# Patient Record
Sex: FEMALE | Race: WHITE | NOT HISPANIC OR LATINO | Employment: OTHER | URBAN - METROPOLITAN AREA
[De-identification: names, ages, dates, MRNs, and addresses within clinical notes are randomized per-mention and may not be internally consistent; named-entity substitution may affect disease eponyms.]

---

## 2023-04-10 LAB
ANION GAP IN SER/PLAS: 12 MMOL/L (ref 10–20)
BASOPHILS (10*3/UL) IN BLOOD BY AUTOMATED COUNT: 0.08 X10E9/L (ref 0–0.1)
BASOPHILS/100 LEUKOCYTES IN BLOOD BY AUTOMATED COUNT: 0.9 % (ref 0–2)
CALCIUM (MG/DL) IN SER/PLAS: 9.2 MG/DL (ref 8.6–10.3)
CARBON DIOXIDE, TOTAL (MMOL/L) IN SER/PLAS: 31 MMOL/L (ref 21–32)
CHLORIDE (MMOL/L) IN SER/PLAS: 98 MMOL/L (ref 98–107)
CREATININE (MG/DL) IN SER/PLAS: 1.11 MG/DL (ref 0.5–1.05)
EOSINOPHILS (10*3/UL) IN BLOOD BY AUTOMATED COUNT: 0.2 X10E9/L (ref 0–0.4)
EOSINOPHILS/100 LEUKOCYTES IN BLOOD BY AUTOMATED COUNT: 2.3 % (ref 0–6)
ERYTHROCYTE DISTRIBUTION WIDTH (RATIO) BY AUTOMATED COUNT: 14.1 % (ref 11.5–14.5)
ERYTHROCYTE MEAN CORPUSCULAR HEMOGLOBIN CONCENTRATION (G/DL) BY AUTOMATED: 31.9 G/DL (ref 32–36)
ERYTHROCYTE MEAN CORPUSCULAR VOLUME (FL) BY AUTOMATED COUNT: 84 FL (ref 80–100)
ERYTHROCYTES (10*6/UL) IN BLOOD BY AUTOMATED COUNT: 5.34 X10E12/L (ref 4–5.2)
GFR FEMALE: 52 ML/MIN/1.73M2
GLUCOSE (MG/DL) IN SER/PLAS: 97 MG/DL (ref 74–99)
HEMATOCRIT (%) IN BLOOD BY AUTOMATED COUNT: 45.1 % (ref 36–46)
HEMOGLOBIN (G/DL) IN BLOOD: 14.4 G/DL (ref 12–16)
IMMATURE GRANULOCYTES/100 LEUKOCYTES IN BLOOD BY AUTOMATED COUNT: 0.5 % (ref 0–0.9)
LEUKOCYTES (10*3/UL) IN BLOOD BY AUTOMATED COUNT: 8.8 X10E9/L (ref 4.4–11.3)
LYMPHOCYTES (10*3/UL) IN BLOOD BY AUTOMATED COUNT: 2.1 X10E9/L (ref 0.8–3)
LYMPHOCYTES/100 LEUKOCYTES IN BLOOD BY AUTOMATED COUNT: 24 % (ref 13–44)
MONOCYTES (10*3/UL) IN BLOOD BY AUTOMATED COUNT: 0.85 X10E9/L (ref 0.05–0.8)
MONOCYTES/100 LEUKOCYTES IN BLOOD BY AUTOMATED COUNT: 9.7 % (ref 2–10)
NEUTROPHILS (10*3/UL) IN BLOOD BY AUTOMATED COUNT: 5.48 X10E9/L (ref 1.6–5.5)
NEUTROPHILS/100 LEUKOCYTES IN BLOOD BY AUTOMATED COUNT: 62.6 % (ref 40–80)
PLATELETS (10*3/UL) IN BLOOD AUTOMATED COUNT: 461 X10E9/L (ref 150–450)
POTASSIUM (MMOL/L) IN SER/PLAS: 4.1 MMOL/L (ref 3.5–5.3)
SODIUM (MMOL/L) IN SER/PLAS: 137 MMOL/L (ref 136–145)
UREA NITROGEN (MG/DL) IN SER/PLAS: 21 MG/DL (ref 6–23)

## 2023-04-12 LAB — STAPH/MRSA SCREEN, CULTURE: NORMAL

## 2023-04-16 LAB — SARS-COV-2 RESULT: NOT DETECTED

## 2023-07-25 ENCOUNTER — HOSPITAL ENCOUNTER (OUTPATIENT)
Dept: DATA CONVERSION | Facility: HOSPITAL | Age: 73
End: 2023-07-25
Attending: OBSTETRICS & GYNECOLOGY | Admitting: OBSTETRICS & GYNECOLOGY
Payer: MEDICARE

## 2023-07-25 DIAGNOSIS — N95.0 POSTMENOPAUSAL BLEEDING: ICD-10-CM

## 2023-07-25 DIAGNOSIS — C54.1 MALIGNANT NEOPLASM OF ENDOMETRIUM (MULTI): ICD-10-CM

## 2023-08-03 LAB
ANION GAP IN SER/PLAS: 16 MMOL/L (ref 10–20)
BASOPHILS (10*3/UL) IN BLOOD BY AUTOMATED COUNT: 0.08 X10E9/L (ref 0–0.1)
BASOPHILS/100 LEUKOCYTES IN BLOOD BY AUTOMATED COUNT: 0.9 % (ref 0–2)
CALCIUM (MG/DL) IN SER/PLAS: 9.6 MG/DL (ref 8.6–10.3)
CARBON DIOXIDE, TOTAL (MMOL/L) IN SER/PLAS: 27 MMOL/L (ref 21–32)
CHLORIDE (MMOL/L) IN SER/PLAS: 98 MMOL/L (ref 98–107)
COMPLETE PATHOLOGY REPORT: NORMAL
CONVERTED ADDENDUM DIAGNOSIS 2: NORMAL
CONVERTED ADDENDUM DIAGNOSIS 3: NORMAL
CONVERTED ADDENDUM DIAGNOSIS 4: NORMAL
CONVERTED CLINICAL DIAGNOSIS-HISTORY: NORMAL
CONVERTED FINAL DIAGNOSIS: NORMAL
CONVERTED FINAL REPORT PDF LINK TO COPY AND PASTE: NORMAL
CONVERTED GROSS DESCRIPTION: NORMAL
CONVERTED PHYSICIAN NOTIFICATION: NORMAL
CREATININE (MG/DL) IN SER/PLAS: 0.94 MG/DL (ref 0.5–1.05)
EOSINOPHILS (10*3/UL) IN BLOOD BY AUTOMATED COUNT: 0.2 X10E9/L (ref 0–0.4)
EOSINOPHILS/100 LEUKOCYTES IN BLOOD BY AUTOMATED COUNT: 2.1 % (ref 0–6)
ERYTHROCYTE DISTRIBUTION WIDTH (RATIO) BY AUTOMATED COUNT: 14.5 % (ref 11.5–14.5)
ERYTHROCYTE MEAN CORPUSCULAR HEMOGLOBIN CONCENTRATION (G/DL) BY AUTOMATED: 32 G/DL (ref 32–36)
ERYTHROCYTE MEAN CORPUSCULAR VOLUME (FL) BY AUTOMATED COUNT: 82 FL (ref 80–100)
ERYTHROCYTES (10*6/UL) IN BLOOD BY AUTOMATED COUNT: 5.33 X10E12/L (ref 4–5.2)
GFR FEMALE: 64 ML/MIN/1.73M2
GLUCOSE (MG/DL) IN SER/PLAS: 78 MG/DL (ref 74–99)
HEMATOCRIT (%) IN BLOOD BY AUTOMATED COUNT: 43.8 % (ref 36–46)
HEMOGLOBIN (G/DL) IN BLOOD: 14 G/DL (ref 12–16)
IMMATURE GRANULOCYTES/100 LEUKOCYTES IN BLOOD BY AUTOMATED COUNT: 0.3 % (ref 0–0.9)
LEUKOCYTES (10*3/UL) IN BLOOD BY AUTOMATED COUNT: 9.4 X10E9/L (ref 4.4–11.3)
LYMPHOCYTES (10*3/UL) IN BLOOD BY AUTOMATED COUNT: 2.33 X10E9/L (ref 0.8–3)
LYMPHOCYTES/100 LEUKOCYTES IN BLOOD BY AUTOMATED COUNT: 24.8 % (ref 13–44)
MONOCYTES (10*3/UL) IN BLOOD BY AUTOMATED COUNT: 0.8 X10E9/L (ref 0.05–0.8)
MONOCYTES/100 LEUKOCYTES IN BLOOD BY AUTOMATED COUNT: 8.5 % (ref 2–10)
NEUTROPHILS (10*3/UL) IN BLOOD BY AUTOMATED COUNT: 5.96 X10E9/L (ref 1.6–5.5)
NEUTROPHILS/100 LEUKOCYTES IN BLOOD BY AUTOMATED COUNT: 63.4 % (ref 40–80)
PLATELETS (10*3/UL) IN BLOOD AUTOMATED COUNT: 459 X10E9/L (ref 150–450)
POTASSIUM (MMOL/L) IN SER/PLAS: 4 MMOL/L (ref 3.5–5.3)
SODIUM (MMOL/L) IN SER/PLAS: 137 MMOL/L (ref 136–145)
UREA NITROGEN (MG/DL) IN SER/PLAS: 18 MG/DL (ref 6–23)

## 2023-08-04 LAB — URINE CULTURE: ABNORMAL

## 2023-08-05 LAB — STAPH/MRSA SCREEN, CULTURE: NORMAL

## 2023-08-09 LAB — URINE CULTURE: NORMAL

## 2023-08-18 ENCOUNTER — HOSPITAL ENCOUNTER (OUTPATIENT)
Dept: DATA CONVERSION | Facility: HOSPITAL | Age: 73
End: 2023-08-19
Attending: OBSTETRICS & GYNECOLOGY | Admitting: OBSTETRICS & GYNECOLOGY
Payer: MEDICARE

## 2023-08-18 DIAGNOSIS — N95.0 POSTMENOPAUSAL BLEEDING: ICD-10-CM

## 2023-08-18 DIAGNOSIS — N88.8 OTHER SPECIFIED NONINFLAMMATORY DISORDERS OF CERVIX UTERI: ICD-10-CM

## 2023-08-18 DIAGNOSIS — N81.89 OTHER FEMALE GENITAL PROLAPSE: ICD-10-CM

## 2023-08-18 DIAGNOSIS — N83.202 UNSPECIFIED OVARIAN CYST, LEFT SIDE: ICD-10-CM

## 2023-08-18 DIAGNOSIS — N83.201 UNSPECIFIED OVARIAN CYST, RIGHT SIDE: ICD-10-CM

## 2023-08-18 DIAGNOSIS — N39.3 STRESS INCONTINENCE (FEMALE) (MALE): ICD-10-CM

## 2023-08-18 DIAGNOSIS — D25.9 LEIOMYOMA OF UTERUS, UNSPECIFIED: ICD-10-CM

## 2023-08-18 DIAGNOSIS — C54.1 MALIGNANT NEOPLASM OF ENDOMETRIUM (MULTI): ICD-10-CM

## 2023-08-18 DIAGNOSIS — N83.8 OTHER NONINFLAMMATORY DISORDERS OF OVARY, FALLOPIAN TUBE AND BROAD LIGAMENT: ICD-10-CM

## 2023-08-18 LAB
ANION GAP IN SER/PLAS: 14 MMOL/L (ref 10–20)
CALCIUM (MG/DL) IN SER/PLAS: 8.7 MG/DL (ref 8.6–10.3)
CARBON DIOXIDE, TOTAL (MMOL/L) IN SER/PLAS: 29 MMOL/L (ref 21–32)
CHLORIDE (MMOL/L) IN SER/PLAS: 98 MMOL/L (ref 98–107)
CREATININE (MG/DL) IN SER/PLAS: 1.01 MG/DL (ref 0.5–1.05)
ERYTHROCYTE DISTRIBUTION WIDTH (RATIO) BY AUTOMATED COUNT: 14.4 % (ref 11.5–14.5)
ERYTHROCYTE MEAN CORPUSCULAR HEMOGLOBIN CONCENTRATION (G/DL) BY AUTOMATED: 33.3 G/DL (ref 32–36)
ERYTHROCYTE MEAN CORPUSCULAR VOLUME (FL) BY AUTOMATED COUNT: 82 FL (ref 80–100)
ERYTHROCYTES (10*6/UL) IN BLOOD BY AUTOMATED COUNT: 4.94 X10E12/L (ref 4–5.2)
GFR FEMALE: 58 ML/MIN/1.73M2
GLUCOSE (MG/DL) IN SER/PLAS: 141 MG/DL (ref 74–99)
HEMATOCRIT (%) IN BLOOD BY AUTOMATED COUNT: 40.3 % (ref 36–46)
HEMOGLOBIN (G/DL) IN BLOOD: 13.4 G/DL (ref 12–16)
LEUKOCYTES (10*3/UL) IN BLOOD BY AUTOMATED COUNT: 19 X10E9/L (ref 4.4–11.3)
PLATELETS (10*3/UL) IN BLOOD AUTOMATED COUNT: 418 X10E9/L (ref 150–450)
POCT GLUCOSE: 145 MG/DL (ref 74–99)
POTASSIUM (MMOL/L) IN SER/PLAS: 3.8 MMOL/L (ref 3.5–5.3)
SODIUM (MMOL/L) IN SER/PLAS: 137 MMOL/L (ref 136–145)
UREA NITROGEN (MG/DL) IN SER/PLAS: 21 MG/DL (ref 6–23)

## 2023-08-24 LAB
COMPLETE PATHOLOGY REPORT: NORMAL
CONVERTED CLINICAL DIAGNOSIS-HISTORY: NORMAL
CONVERTED FINAL DIAGNOSIS: NORMAL
CONVERTED FINAL REPORT PDF LINK TO COPY AND PASTE: NORMAL
CONVERTED GROSS DESCRIPTION: NORMAL
CONVERTED SYNOPTIC DIAGNOSIS: NORMAL

## 2023-08-29 ENCOUNTER — HOSPITAL ENCOUNTER (OUTPATIENT)
Dept: DATA CONVERSION | Facility: HOSPITAL | Age: 73
End: 2023-08-29

## 2023-09-29 VITALS — HEIGHT: 65 IN | BODY MASS INDEX: 48.82 KG/M2 | WEIGHT: 293 LBS

## 2023-09-30 NOTE — H&P
History of Present Illness:   History Present Illness:  Reason for surgery: 72 year old female with lichen  sclerosis and PMB p/f D&C hysteroscopy and polypectomy   HPI:    72 year old female with lichen sclerosis and PMB p/f D&C hysteroscopy and polypectomy     PMH: Hx of DCIS, HTN  PSH:    · History of Arthrodesis Cervical   · History of Breast Surgery   · History of Cataract surgery   · History of Craniotomy Infratentorial Excision Of Meningioma   · History of Craniotomy Supratentorial Excision Of Meningioma   · History of Spine Repair  CERVICAL SPINE SURGERY/SPINAL FUSION   · History of Tubal Ligation  Meds: amlodipine, effexor, HCTZ, lorazepam, oxycodone, tramadol  All: codeine, prilosec  Social hx: negx3  Fam hx: reviewed, noncontributory      Allergies:        Allergies:  ·  hydrocortisone : Unknown  ·  codeine : Itching  ·  hair  dye: Itching    Home Medication Review:   Home Medications Reviewed: yes     Impression/Procedure:   ·  Impression and Planned Procedure: 72 year old female with lichen sclerosis and PMB p/f D&C hysteroscopy and polypectomy       ERAS (Enhanced Recovery After Surgery):  ·  ERAS Patient: yes   ·  CPM/PAT Utilization: no   ·  Immunonutrition Recovery Drink Utilization: no   ·  Carbohydrate Supplement Drink Utilization: no     Review of Systems:   Review of Systems:  All Other Systems: All other systems reviewed and  are negative       Physical Exam by System:    Constitutional: NAD   Eyes: EOMI   ENMT: mucous membranes moist   Head/Neck: NCAT   Respiratory/Thorax: normal work of breathing   Cardiovascular: warm and well perfused   Gastrointestinal: abdomen soft   Musculoskeletal: normal ROM   Extremities: no edema appreciated   Neurological: no gross deficits   Psychological: normal mood and affect   Skin: Warm and dry     Consent:   COVID-19 Consent:  ·  COVID-19 Risk Consent Surgeon has reviewed key risks related to the risk of leonila COVID-19 and if they contract COVID-19  what the risks are.     Attestation:   Note Completion:  I am a:  Resident/Fellow   Attending Attestation I saw and evaluated the patient.  I personally obtained the key and critical portions of the history and physical exam or was physically present for key and  critical portions performed by the resident/fellow. I reviewed the resident/fellow?s documentation and discussed the patient with the resident/fellow.  I agree with the resident/fellow?s medical decision making as documented in the note.     I personally evaluated the patient on 25-Jul-2023         Electronic Signatures:  Anastacia Pack)  (Signed 25-Jul-2023 09:23)   Authored: Note Completion   Co-Signer: History of Present Illness, Allergies, Home Medication Review, Impression/Procedure, ERAS, Review of Systems, Physical Exam,  Consent, Note Completion  Aggie Freeman (Resident))  (Signed 25-Jul-2023 07:21)   Authored: History of Present Illness, Allergies, Home  Medication Review, Impression/Procedure, ERAS, Review of Systems, Physical Exam, Consent, Note Completion      Last Updated: 25-Jul-2023 09:23 by Anastacia Pack)

## 2023-09-30 NOTE — DISCHARGE SUMMARY
Send Summary:   Discharge Summary Providers:  Provider Role Provider Name   · Attending Anastacia Pack       Note Recipients: Anastacia Pack MD       Discharge:    Summary:   Admission Date: .18-Aug-2023 10:37:00   Discharge Date: 19-Aug-2023   Attending Physician at Discharge: Anastacia Pack   Admission Reason: endometrial cancer, pelvic organ  prolapse   Final Discharge Diagnoses: pelvic organ prolapse,  MIKALA, endometrial cancer   Procedures: Date: 18-Aug-2023 15:33:00  Procedure Name: 1. Total laparoscopic hysterectomy  2. Bilateral salpingo-oophorectomy  3. [place] lymph node mapping   4. Pelvic lymph node biopsies    Date: 18-Aug-2023 17:39:00  Procedure Name: TLH, BS, bilateral SLND by Dr. Trent   LSC USLS, MUS, cystoscopy by Dr. Pack   Condition at Discharge: Satisfactory   Disposition at Discharge: .Home   Hospital Course:    Ms. Lira is POD 1 s/p TLH, BSO, bilateral SLND, LSC USLS, MUS, cystoscopy for Gr1 EMCA and POP/ MIKALA.      She was transferred from the PACU to the floor.  She met all post-op milestones.  VS have been reassuring overnight and exam is benign.  CBC appears hemoconcentrated, no concern for infection based on exam and VS.    MUS c/b bladder perforation, so sanabria catheter remains in place with plan to remove on Wednesday.                Discharge Information:    and Continuing Care:   Lab Results - Pending:    Surgical Pathology Drawn at 18-Aug-2023 17:12:00  Radiology Results - Pending: None   Discharge Instructions:    Activity:           activity as tolerated   No lifting > 10 lbs for 6 weeks.          May shower..            May drive..  OK to drive when no longer using narcotics          No pushing, pulling, or lifting objects greater than 10 pounds for 6 week(s).      Nutrition/Diet:           regular    Catheter Care:           Type:   indwelling,  You will return to Dr. Pack's office on 8/23 for removal of sanabria    Follow Up Appointments:    Follow-Up Appointment  01:           Physician/Dept/Service:   Dr. Pack          Reason for Referral:   Sanabria removal          Call to Schedule in:   2-3 days,  Schedule sanabria removal on 8/23 and 3 week post-op visit          Scheduled Date/Time:   23-Aug-2023    Follow-Up Appointment 02:           Physician/Dept/Service:   Dr. Trent          Reason for Referral:   GYN Oncology          Call to Schedule in:   2 weeks    Discharge Medications: Home Medication   Effexor  mg oral capsule, extended release - 1 cap(s) orally once a day  ZyrTEC 10 mg oral tablet - 1 tab(s) orally once a day  amLODIPine 2.5 mg oral tablet - 1 tab(s) orally 2 times a day  hydroCHLOROthiazide 12.5 mg oral tablet - 1 tab(s) orally once a day  Calcium 600+D oral tablet - 1 tab(s) orally once a day  famotidine 40 mg oral tablet - 1 tab(s) orally once a day (at bedtime)  famotidine 20 mg oral tablet - 1 tab(s) orally once a day in AM  Vitamin D3 2000 intl units oral capsule - 1 cap(s) orally once a day  acetaminophen 500 mg oral tablet - 2 tab(s) orally every 6 hours   ibuprofen 600 mg oral tablet - 1 tab(s) orally every 6 hours   oxyCODONE 5 mg oral tablet - 1 tab(s) orally every 6 hours as needed  Effexor XR 37.5 mg oral capsule, extended release - 1 cap(s) orally once a day  Vitamin B12 5000 mcg oral tablet, disintegrating - 1 tab(s) orally every other day (at bedtime)  Peridex 0.12% mucous membrane liquid - 15 milliliter(s) orally 2 times a day  use night before surgery and morning of surgery swish and spit      PRN Medication   Tylenol 500 mg oral tablet - 2 tab(s) orally every 6 hours, As Needed  clotrimazole 1% topical cream - Apply topically to affected area 2 times a day, As Needed  Flonase 50 mcg/inh nasal spray - 1 spray(s) nasal once a day, As Needed  albuterol - 1-2 puffs inhaled 90mcg inhaler every 8 hours, As Needed for shortness of breath  Ativan 0.5 mg oral tablet - 1/2 tab(s) orally 3 times a day, As Needed     DNR Status:   ·  Code Status  Code Status order at time of discharge: Full Code     Attestation:   Note Completion:  I am a:  Resident/Fellow   Attending Attestation I reviewed the resident/fellow?s documentation and discussed the patient with the resident/fellow.  I agree with the resident/fellow?s medical  decision making as documented in the note.          Electronic Signatures:  Dara Carbajal (Fellow))  (Signed 19-Aug-2023 09:24)   Authored: Send Summary, Summary Content, Ongoing Care,  DNR Status, Note Completion  Anastacia Pack)  (Signed 19-Aug-2023 10:09)   Authored: Summary Content, Note Completion   Co-Signer: Send Summary, Summary Content, Ongoing Care, DNR Status, Note Completion      Last Updated: 19-Aug-2023 10:09 by Anastacia Pack)

## 2023-09-30 NOTE — PROGRESS NOTES
Service: Gynecology/Obstetrics     Subjective Data:   MANSI CARTER is a 73 year old Female who is Hospital Day # 2 and POD #1 for TLH, BSO, bilateral SLND, LSC USLS, MUS, cystoscopy.    Additional Information:    Ms. Carter is feeling very well this morning.   Her pain was controlled overnight.  She tolerated clears and a banana without n/v.  She has ambulated around  the floor x1.  Holbrook remains in place draining yellow urine.  Has been passing flatus.      Objective Data:     Objective Information:        T   P  R  BP   MAP  SpO2   Value  36.6  72  17  119/69      94%  Date/Time 8/18 23:00 8/19 7:28 8/19 7:28 8/19 7:28    8/19 7:28  Range  (36.6C - 37C )  (72 - 81 )  (17 - 18 )  (119 - 153 )/ (69 - 86 )    (94% - 96% )  Highest temp of 37 C was recorded at 8/18 20:15    Physical Exam by System:    Constitutional: Well developed, awake/alert/oriented  x3, no distress, alert and cooperative   Respiratory/Thorax: Patent airways, CTAB, normal  breath sounds with good chest expansion, thorax symmetric   Cardiovascular: Regular, rate and rhythm, no murmurs,  2+ equal pulses of the extremities, normal S 1and S 2   Gastrointestinal: Nondistended, soft, non-tender,  no rebound tenderness or guarding, no masses palpable, no organomegaly, +BS; 5 incisions C/D/I with glue and small bruising   Genitourinary: Holbrook in place   Extremities: normal extremities, no cyanosis edema,  contusions or wounds   Psychological: Appropriate mood and behavior   Skin: Warm and dry, no lesions, no rashes     Recent Lab Results:    Results:    CBC: 8/18/2023 23:20              \     Hgb     /                              \     13.4       /  WBC  ----------------  Plt               19.0 H    ----------------    418              /     Hct     \                              /     40.3       \            RBC: 4.94     MCV: 82           BMP: 8/18/2023 23:20  NA+        Cl-     BUN  /                         137    98    21   /  --------------------------------  Glucose                ---------------------------  141 H    K+     HCO3-   Creat \                         3.8  29    1.01  \  Calcium : 8.7     Anion Gap : 14      Assessment and Plan:   Daily Risk Screen:  ·  Does patient have an indwelling urinary catheter? yes   ·  Plan for indwelling urinary catheter removal today? no   ·  The patient continues to require indwelling urinary catheterization for perioperative use for selected surgical procedures   ·  Does patient have a central line? n/a consulting service          Additional Dx:   Uterine prolapse: Entered Date: 18-Aug-2023 17:36   Stress incontinence in female: Entered Date: 18-Aug-2023  17:36   Postoperative pain: Entered Date: 18-Aug-2023 17:29   Endometrial cancer determined by uterine biopsy: Entered  Date: 14-Aug-2023 10:09    Code Status:  ·  Code Status Full Code     Advance Care Planning:  Advance Care Planning: Patient didn't wish to or  was unable to provide advance care plan     Assessment:    Ms. Lira is POD 1 s/p TLH, BSO, bilateral SLND, LSC USLS, MUS, cystoscopy for Gr1 EMCA and POP/ MIKALA.      She is doing well post-operatively, meeting milestones.  MUS c/b bladder perforation, will plan for sanabria to remain in place until Wednesday.      VS have been reassuring overnight and exam is benign.  CBC appears hemoconcentrated, no concern for infection based on exam and VS.  Plan for discharge home this AM.        Attestation:   Note Completion:  I am a:  Resident/Fellow   Attending Attestation I reviewed the resident/fellow?s documentation and discussed the patient with the resident/fellow.  I agree with the resident/fellow?s medical  decision making as documented in the note.          Electronic Signatures:  Dara Carbajal (Fellow))  (Signed 19-Aug-2023 09:28)   Authored: Service, Subjective Data, Objective Data, Assessment  and Plan, Note Completion  Anastacia Pack)  (Signed 19-Aug-2023  10:09)   Authored: Assessment and Plan, Note Completion   Co-Signer: Service, Subjective Data, Objective Data, Assessment and Plan, Note Completion      Last Updated: 19-Aug-2023 10:09 by Anastacia Pack)

## 2023-09-30 NOTE — H&P
History & Physical Reviewed:   I have reviewed the History and Physical dated:  18-Aug-2023   History and Physical reviewed and relevant findings noted. Patient examined to review pertinent physical  findings.: No significant changes   Home Medications Reviewed: no changes noted   Allergies Reviewed: no changes noted       ERAS (Enhanced Recovery After Surgery):  ·  ERAS Patient: yes   ·  CPM/PAT Utilization: no   ·  Immunonutrition Recovery Drink Utilization: no   ·  Carbohydrate Supplement Drink Utilization: no     Consent:   COVID-19 Consent:  ·  COVID-19 Risk Consent Surgeon has reviewed key risks related to the risk of leonila COVID-19 and if they contract COVID-19 what the risks are.     Attestation:   Note Completion:  I am a:  Resident/Fellow   Attending Attestation I saw and evaluated the patient.  I personally obtained the key and critical portions of the history and physical exam or was physically present for key and  critical portions performed by the resident/fellow. I reviewed the resident/fellow?s documentation and discussed the patient with the resident/fellow.  I agree with the resident/fellow?s medical decision making as documented in the note.     I personally evaluated the patient on 18-Aug-2023         Electronic Signatures:  Mitali Garcia (Resident))  (Signed 18-Aug-2023 09:40)   Authored: History & Physical Reviewed, ERAS, Consent,  Note Completion  Demetria Trent)  (Signed 23-Aug-2023 10:19)   Authored: Note Completion   Co-Signer: History & Physical Reviewed, ERAS, Consent, Note Completion      Last Updated: 23-Aug-2023 10:19 by Demetria Trent)

## 2023-10-01 NOTE — OP NOTE
Post Operative Note:     PreOp Diagnosis: Grade 1 endometrial cancer   Post-Procedure Diagnosis: Same; clinical stage I   Procedure: 1. Total laparoscopic hysterectomy  2. Bilateral salpingo-oophorectomy  3. [place] lymph node mapping   4. Pelvic lymph node biopsies   Surgeon: PETER Trent   Resident/Fellow/Other Assistant: AVINASH Womack   Anesthesia: GETA   I.V. Fluids: See EHR   Estimated Blood Loss (mL): 50   Blood Replacement: 0   Specimen: yes. Uterus, cervix, bilateral tubes and  ovaries; left sentinel pelvic lymph node, right sentinel pelvic lymph node   Complications: None apparent   Findings: Lobular multifibroid uterus     Operative Report Dictated:  Dictation: not applicable - note contains Operative  Report   Operative Report:    he patient was taken to the operating room where she had sequential compression devices placed and received perioperative antibiotics.  A safety check confirming  patient's name and MR number was performed. General anesthesia was administered and found to be adequate. The patient was positioned in low lithotomy with use of Alexis stirrups. The abdomen and vagina were prepped and draped in sterile fashion. A Holbrook  catheter was placed.  A speculum was placed in the vagina and a single-tooth tenaculum was placed on the anterior lip of the cervix.  A 1.25:1 solution of indocyanine green and sterile water was injected into the cervix at 3 and 9 o'clock both superficial  and deep for sentinel lymph node mapping. The uterus sounded to 8 cm.  The cervix was dilated and medium V care was placed.      Attention was then turned to the patient's abdomen where a 12mm skin incision was made at the umbilicus. The anterior fascia was identified under direct visualization and grasped with tonsil clamp x2 and incised sharply. The fused anterior posterior fascia  was tagged with a 0-Vicryl suture. A 12mm Levar port was then inserted and pneumoperitoneum established without difficulty. The  camera was inserted without difficulty. Entry site was inspected and surveillance of the abdomen performed, demonstrating  no evidence of bowel or vascular injury at entry.  The findings dictated above were then noted.  Two additional 5 mm ports were placed in the right and left lower quadrant under direct visualization without difficulty.  The patient was then placed in  steep Trendelenburg position and the bowel was elevated out of the pelvis using an atraumatic grasper.  Small omental adhesions were taken, using the LigaSure bipolar device with careful attention to underlying structures.     The uterus was then anteverted.  The round ligament was identified on the left side and it was transected using a 5 mm laparoscopic LigaSure device. The retroperitoneum was entered  and the peritoneum was incised in a plane lateral and parallel to the ovarian blood supply.  The infundibulopelvic ligament was skeletonized. The ureter was visualized and well posterior to the ovarian blood supply. Retroperitoneal spaces were developed  bluntly. [place] lymph node mapping was successful and noted to map to the obturator lymph node chain. The sentinel node was gently grasped and a combination of blunt, sharp, and electrocautery dissection was used to excise the node. The obturator nerve  was visualized during this dissection to minimize injury. Firefly mode confirmed successful removal and the node was left in situ for later removal after colpotomy. Hemostasis was noted from this dissection. A similar process was repeated on the contralateral  side, including development of retroperitoneal spaces after visualization of the ureter. The right side similarly mapped to the obturator lymph node chain which was excised and hemostasis noted with this dissection with visualization of the obturator  nerve.     A window was made under the infundibulopelvic ligament and the IP ligament was desiccated and transected using the LigaSure  device.  The posterior leaf of the broad ligament was incised.   A bladder flap was then created anteriorly on the right side using the LigaSure.  After skeletonizing the uterine artery, it was taken with the LigaSure.  The cardinal and uterosacral ligaments were divided with the LigaSure, with each pedicle created  medial to the previous to prevent ureteral injury. Attention was then turned to the patient's left side where in a similar fashion, the steps were repeated. Visualization of the ureter was also performed prior to desiccating and transecting the infundibulopelvic  ligament.  The posterior leaf of the broad ligament was then opened.  The bladder flap was completed on the left side.  The uterine arteries were skeletonized and were taken using the LigaSure device. The cardinal and uterosacral ligaments were divided  in a similar manner as the right side.  At this point, the uterine manipulator was clearly palpated and the cervicovaginal junction was incised circumferentially with cautery without any difficulty.  The uterus with cervix, bilateral fallopian tubes and  ovaries were placed in endocatch bag for removal through the vagina and submitted for evaluation. At this point, all pedicles were inspected and were hemostatic. The left pelvic sentinel lymph node and right pelvic sentinel lymph nodes were subsequently  removed from the assistant ports and submitted to Pathology.     Hemostasis was noted from the surgical dissection and the pelvis was irrigated. The vaginal cuff was reapproximated using 0 V-Lock suture and closed laparoscopically. Good hemostasis  was noted. At this point, the remainder of the case was handed over to Dr Pack for completion of the uterusacral ligament suspension and prolapse correction. The patient tolerated the procedure well. Please refer to separate dictation for further details  and conclusion of the procedure.     Attestation:   Note Completion:  Attending Attestation I  was present for the entire procedure         Electronic Signatures:  Demetria Trent)  (Signed 18-Aug-2023 15:47)   Authored: Post Operative Note, Note Completion      Last Updated: 18-Aug-2023 15:47 by Demetria Trent)

## 2023-10-01 NOTE — OP NOTE
PROCEDURE DETAILS    Preoperative Diagnosis:  Stress urinary incontinence  Pelvic organ prolapse  Endometrial cancer  Postoperative Diagnosis:  Stress urinary incontinence  Pelvic organ prolapse  Endometrial cancer  Surgeon: Anastacia Pack  Resident/Fellow/Other Assistant: Dara Farr    Procedure:  TLH, BS, bilateral SLND by Dr. Trent   [facility] USLS, MUS, cystoscopy by Dr. Pack  Anesthesia: GETA  Estimated Blood Loss: 75cc total  Findings: Bilateral ureteral jets seen briskly from both ureters after removing left uterosacral stitch.  Good apical support noted.   Sling trocar noted to palmer bladder, after sling replaced  no injury noted.  IV Fluids: 1600  Additional Details: PROCEDURE IN DETAIL:  The patient was interviewed in the preop holding area by the surgical team, where the risks, benefits, and indication of the procedure were reviewed.  The patient was positioned in dorsal lithotomy and underwent total laparoscopic hysterectomy, bilateral  salpingectomy, bilateral pelvic sentinel LN mapping and biopsy for Endometrial cancer - please see Dr. Trent's operative note for additional details.     After the vaginal cuff was closed, attention turned to the laparoscopic uterosacral ligament suspension.  0-Gortex suture was used to suture the right uterosacral ligament to the vaginal cuff, the same procedure was repeated on the left.  The Laparoscopic  ports were closed (3-0 monocryl on skin and 0-vicryl was used to close fascia of the supra-umbilical port) and cystoscopy was performed.  Brisk efflux from the right ureter was noted and small efflux from the right ureter was noted.  Decision made to  replace 2 laparoscopic ports and the left uterosacral suture was cut.  Repeat cystoscopy was performed with brisk efflux from both ureters, and the 2 laparoscopic port sites were re-closed.  Dermabond was placed over all incisions.      Attention was turned to the sling.  The Lonestar retractor was placed  around the perineum. The anterior vaginal wall was infiltrated with lidocaine 1% with epinephrine and a superficial incision starting approximately 1 cm inferior to the urethral meatus  was carried out approximately 1.5 cm with a knife. From superficial to deep, the anterior vaginal wall was then dissected from the tissue surrounding the urethra until the pubic bone was reached. This procedure was repeated on the opposite side. Two small  knife holes were then created in the mons pubis just cephalad and approximately 1 cm lateral to the pubic symphysis. The sling trocars were then inserted through the holes in the vagina and were brought out through the mons pubis. A cystoscopy was performed  which demonstrated the left sling trocar had pierced the bladder.  The trocar was removed and re-passed.  Repeat cystoscopy was performed and no additional defects were noted within the bladder wall.  Prior puncture site was hemostatic.  Bilateral ureteral  orifices also produced normal jets. The mesh sling was then pulled through the dissected space. After the sling was tensioned appropriately, the remainder of the mesh protruding from the mons pubis was cut, leaving just two puncture holes on the mons  pubis. The anterior vaginal wall was then closed with a 3.0 monocryl suture in a running fashion. Two additional interrupted sutures were used to reinforce the closure. Dermabond was placed over the puncture holes located on the mons pubis.  Holbrook was  replaced.     The patient was then awakened from anesthesia, having tolerated procedure well, and was taken to the recovery room in stable condition. All sponge, needle, and instrument counts were correct at the end the case.    Dara Farr MD, Fellow    Dr. Pack was present for the full procedure.    Patient Returned To/Condition: PACU stable                                Attestation:   Note Completion:  Attending Attestation I was present for the entire procedure    I  am a: Resident/Fellow         Electronic Signatures:  Dara Carbajal (Fellow))  (Signed 18-Aug-2023 17:51)   Authored: Post-Operative Note, Chart Review, Note Completion  Anastacia Pack)  (Signed 19-Aug-2023 10:08)   Authored: Post-Operative Note, Chart Review, Note Completion   Co-Signer: Post-Operative Note, Chart Review, Note Completion      Last Updated: 19-Aug-2023 10:08 by Anastacia Pack)

## 2023-10-02 NOTE — OP NOTE
Post Operative Note:     PreOp Diagnosis: Postmenopausal bleeding   Post-Procedure Diagnosis: Postmenopausal bleeding   Procedure: 1. Dilation  2. Hysteroscopy  3. Cervical laceration repair  4. Endometrial sampling   Surgeon: Sirena   Resident/Fellow/Other Assistant: Lydia   Anesthesia: MAC   I.V. Fluids: 100cc crystalloid   Estimated Blood Loss (mL): 20   Blood Replacement: none   Specimen: yes. endometrial curretings   Complications: uterine perforation   Findings: fluffy, atypical appearing endometrium.  short cervical canal.   Patient Returned To/Condition: PACU, stable   Urine Output: not measured   Drains and/or Catheters: none     Operative Report Dictated:  Dictation: not applicable - note contains Operative  Report   Operative Report:    After informed consent was obtained, the patient was taken to the operating room where MAC anesthesia was administered. She was then placed in the dorsal lithotomy  position. An exam under anesthesia revealed a slightly retroverted uterus. She was then prepped and draped in the usual sterile fashion. Weighted speculum was inserted to visualize the cervix. A single tooth tenaculum was used to grasp the anterior lip  of the cervix. Schaeffer dilators were used to dilate up to a 15 Erit[place]an. The cervical canal was stenotic and the dilators did not easily pass. While using the 15 E[place]rean dilator there was a sudden loss of tension indicating a perforation. Additionally, during  dilation the tenaculum tore through the cervix creating an anterior cervical laceration. Hysteroscope was introduced atraumatically into cervix to hydrodissect the canal, and then pass through the internal cervical os into the uterus. The uterine cavity  was atypical with fluffy endometrial tissue. The ostia could not be identified. There was a false passage and perforation site noted at the anterior uterine fundus which was hemostatic. The hysteroscope was then removed. Fluid deficit was noted to be   700cc at the end of the procedure.    Following hysteroscopy, polyp forceps were used to gently obtain endometrial tissue. Multiple passes were performed. After sample was obtained, tenaculum was removed. A figure of 12 stitch of 0-vicryl was placed at the site of the cervical laceration  which was then noted to be hemostatic with minimal pressure. Retractors were removed from the vagina. All counts were correct, the patient tolerated the procedure well.  Dr. Pack was present for the entire procedure. The patient was taken to PACU in  stable condition.    Attestation:   Note Completion:  I am a: Resident/Fellow   Attending Attestation I was present for the entire procedure          Electronic Signatures:  Anastacia Pack)  (Signed 25-Jul-2023 14:20)   Authored: Post Operative Note, Note Completion   Co-Signer: Post Operative Note, Note Completion  Aggie Freeman (Resident))  (Signed 25-Jul-2023 12:01)   Authored: Post Operative Note, Note Completion      Last Updated: 25-Jul-2023 14:20 by Anastacia Pack)

## 2023-10-07 PROBLEM — E28.39 HYPOESTROGENISM: Status: ACTIVE | Noted: 2023-10-07

## 2023-10-07 PROBLEM — R29.898 LEFT LEG WEAKNESS: Status: ACTIVE | Noted: 2023-10-07

## 2023-10-07 PROBLEM — D17.1 LIPOMA OF BACK: Status: ACTIVE | Noted: 2023-10-07

## 2023-10-07 PROBLEM — D22.9 NUMEROUS MOLES: Status: ACTIVE | Noted: 2023-10-07

## 2023-10-07 PROBLEM — R20.2 PARESTHESIA: Status: ACTIVE | Noted: 2023-10-07

## 2023-10-07 PROBLEM — E55.9 VITAMIN D DEFICIENCY: Status: ACTIVE | Noted: 2023-10-07

## 2023-10-07 PROBLEM — M85.80 OSTEOPENIA: Status: ACTIVE | Noted: 2023-10-07

## 2023-10-07 PROBLEM — D32.9 MENINGIOMA (MULTI): Status: ACTIVE | Noted: 2023-10-07

## 2023-10-07 PROBLEM — G90.9 AUTONOMIC DYSFUNCTION: Status: ACTIVE | Noted: 2023-10-07

## 2023-10-07 PROBLEM — H52.209 MYOPIA WITH ASTIGMATISM AND PRESBYOPIA: Status: ACTIVE | Noted: 2023-10-07

## 2023-10-07 PROBLEM — K21.9 GERD (GASTROESOPHAGEAL REFLUX DISEASE): Status: ACTIVE | Noted: 2023-10-07

## 2023-10-07 PROBLEM — R73.9 BORDERLINE HYPERGLYCEMIA: Status: ACTIVE | Noted: 2023-10-07

## 2023-10-07 PROBLEM — R93.89 ABNORMAL CAROTID ULTRASOUND: Status: ACTIVE | Noted: 2023-10-07

## 2023-10-07 PROBLEM — N89.5 ACQUIRED VAGINAL ADHESIONS: Status: ACTIVE | Noted: 2023-10-07

## 2023-10-07 PROBLEM — J45.909 REACTIVE AIRWAY DISEASE (HHS-HCC): Status: ACTIVE | Noted: 2023-10-07

## 2023-10-07 PROBLEM — N28.9 MILD RENAL INSUFFICIENCY: Status: ACTIVE | Noted: 2023-10-07

## 2023-10-07 PROBLEM — I95.1 ORTHOSTATIC HYPOTENSION: Status: ACTIVE | Noted: 2023-10-07

## 2023-10-07 PROBLEM — I10 BENIGN ESSENTIAL HYPERTENSION: Status: ACTIVE | Noted: 2023-10-07

## 2023-10-07 PROBLEM — E78.5 DYSLIPIDEMIA: Status: ACTIVE | Noted: 2023-10-07

## 2023-10-07 PROBLEM — M17.12 OSTEOARTHRITIS OF LEFT KNEE: Status: ACTIVE | Noted: 2023-10-07

## 2023-10-07 PROBLEM — R91.8 ABNORMAL CT SCAN, LUNG: Status: ACTIVE | Noted: 2023-10-07

## 2023-10-07 PROBLEM — M81.0 AGE RELATED OSTEOPOROSIS: Status: ACTIVE | Noted: 2023-10-07

## 2023-10-07 PROBLEM — M35.3 POLYMYALGIA RHEUMATICA (MULTI): Status: ACTIVE | Noted: 2023-10-07

## 2023-10-07 PROBLEM — I83.90 ASYMPTOMATIC VARICOSE VEINS: Status: ACTIVE | Noted: 2023-10-07

## 2023-10-07 PROBLEM — F40.240 CLAUSTROPHOBIA: Status: ACTIVE | Noted: 2023-10-07

## 2023-10-07 PROBLEM — R10.13 DYSPEPSIA: Status: ACTIVE | Noted: 2023-10-07

## 2023-10-07 PROBLEM — M79.662 TENDERNESS OF LEFT CALF: Status: ACTIVE | Noted: 2023-10-07

## 2023-10-07 PROBLEM — R42 POSTURAL DIZZINESS: Status: ACTIVE | Noted: 2023-10-07

## 2023-10-07 PROBLEM — M77.9 TENDONITIS: Status: ACTIVE | Noted: 2023-10-07

## 2023-10-07 PROBLEM — M62.838 MUSCLE SPASM: Status: ACTIVE | Noted: 2023-10-07

## 2023-10-07 PROBLEM — L29.2 VULVAR PRURITUS: Status: ACTIVE | Noted: 2023-10-07

## 2023-10-07 PROBLEM — M17.11 PRIMARY LOCALIZED OSTEOARTHROSIS OF RIGHT LOWER LEG: Status: ACTIVE | Noted: 2023-10-07

## 2023-10-07 PROBLEM — Z86.011 H/O MENINGIOMA OF THE BRAIN: Status: ACTIVE | Noted: 2023-10-07

## 2023-10-07 PROBLEM — R29.898 WEAKNESS OF BOTH UPPER EXTREMITIES: Status: ACTIVE | Noted: 2023-10-07

## 2023-10-07 PROBLEM — M79.7 FIBROMYALGIA: Status: ACTIVE | Noted: 2023-10-07

## 2023-10-07 PROBLEM — N20.0 KIDNEY STONE: Status: ACTIVE | Noted: 2023-10-07

## 2023-10-07 PROBLEM — Q52.5 LABIA MINORA AGGLUTINATION: Status: ACTIVE | Noted: 2023-10-07

## 2023-10-07 PROBLEM — H52.4 MYOPIA WITH ASTIGMATISM AND PRESBYOPIA: Status: ACTIVE | Noted: 2023-10-07

## 2023-10-07 PROBLEM — G56.03 BILATERAL CARPAL TUNNEL SYNDROME: Status: ACTIVE | Noted: 2023-10-07

## 2023-10-07 PROBLEM — M41.9 ACQUIRED SCOLIOSIS: Status: ACTIVE | Noted: 2023-10-07

## 2023-10-07 PROBLEM — H52.10 MYOPIA WITH ASTIGMATISM AND PRESBYOPIA: Status: ACTIVE | Noted: 2023-10-07

## 2023-10-07 PROBLEM — M41.05 INFANTILE IDIOPATHIC SCOLIOSIS OF THORACOLUMBAR REGION: Status: ACTIVE | Noted: 2023-10-07

## 2023-10-07 PROBLEM — R09.89 LABILE HYPERTENSION: Status: ACTIVE | Noted: 2023-10-07

## 2023-10-07 PROBLEM — J45.20 INTERMITTENT ASTHMA (HHS-HCC): Status: ACTIVE | Noted: 2023-10-07

## 2023-10-07 PROBLEM — M25.473 ANKLE EDEMA: Status: ACTIVE | Noted: 2023-10-07

## 2023-10-07 PROBLEM — N20.9 UROLITHIASIS: Status: ACTIVE | Noted: 2023-10-07

## 2023-10-07 PROBLEM — R06.02 SOB (SHORTNESS OF BREATH) ON EXERTION: Status: ACTIVE | Noted: 2023-10-07

## 2023-10-07 PROBLEM — M17.0 ARTHRITIS OF BOTH KNEES: Status: ACTIVE | Noted: 2023-10-07

## 2023-10-07 RX ORDER — ACETAMINOPHEN 500 MG
1 TABLET ORAL DAILY
COMMUNITY

## 2023-10-07 RX ORDER — CETIRIZINE HYDROCHLORIDE 10 MG/1
1 TABLET ORAL DAILY
COMMUNITY
Start: 2013-09-16

## 2023-10-07 RX ORDER — ALBUTEROL SULFATE 90 UG/1
2 AEROSOL, METERED RESPIRATORY (INHALATION)
COMMUNITY
Start: 2013-05-29 | End: 2024-06-03 | Stop reason: SDUPTHER

## 2023-10-07 RX ORDER — VENLAFAXINE HYDROCHLORIDE 150 MG/1
150 CAPSULE, EXTENDED RELEASE ORAL DAILY
COMMUNITY

## 2023-10-07 RX ORDER — KETOCONAZOLE 20 MG/ML
SHAMPOO, SUSPENSION TOPICAL
COMMUNITY
Start: 2015-03-31 | End: 2023-12-19 | Stop reason: ALTCHOICE

## 2023-10-07 RX ORDER — CLOTRIMAZOLE AND BETAMETHASONE DIPROPIONATE 10; .64 MG/G; MG/G
CREAM TOPICAL
COMMUNITY
Start: 2015-04-15 | End: 2024-05-02 | Stop reason: SDUPTHER

## 2023-10-07 RX ORDER — HYDROXYCHLOROQUINE SULFATE 200 MG/1
1 TABLET, FILM COATED ORAL DAILY
COMMUNITY
End: 2024-03-27

## 2023-10-07 RX ORDER — AMLODIPINE BESYLATE 2.5 MG/1
2.5 TABLET ORAL 2 TIMES DAILY
COMMUNITY
End: 2024-04-16 | Stop reason: SDUPTHER

## 2023-10-07 RX ORDER — HYDROCHLOROTHIAZIDE 12.5 MG/1
12.5 CAPSULE ORAL EVERY MORNING
COMMUNITY
End: 2023-11-28

## 2023-10-07 RX ORDER — DOCUSATE SODIUM 100 MG/1
100 CAPSULE, LIQUID FILLED ORAL
COMMUNITY
Start: 2023-04-19 | End: 2023-12-19

## 2023-10-07 RX ORDER — PANTOPRAZOLE SODIUM 40 MG/1
40 TABLET, DELAYED RELEASE ORAL
COMMUNITY
Start: 2023-04-19 | End: 2024-03-27

## 2023-10-07 RX ORDER — GLUCOSAMINE/CHONDR SU A SOD 750-600 MG
1 TABLET ORAL DAILY
COMMUNITY

## 2023-10-07 RX ORDER — FAMOTIDINE 20 MG/1
20 TABLET, FILM COATED ORAL EVERY MORNING
COMMUNITY
Start: 2022-08-17

## 2023-10-07 RX ORDER — FLUTICASONE PROPIONATE 50 MCG
1 SPRAY, SUSPENSION (ML) NASAL DAILY PRN
COMMUNITY

## 2023-10-07 RX ORDER — PNV NO.95/FERROUS FUM/FOLIC AC 28MG-0.8MG
1 TABLET ORAL DAILY
COMMUNITY

## 2023-10-07 RX ORDER — AMOXICILLIN 500 MG/1
2000 CAPSULE ORAL
COMMUNITY
Start: 2023-06-01

## 2023-10-07 RX ORDER — ACETAMINOPHEN 500 MG
3 TABLET ORAL DAILY
COMMUNITY

## 2023-10-07 RX ORDER — LORAZEPAM 0.5 MG/1
0.5 TABLET ORAL DAILY PRN
COMMUNITY

## 2023-10-07 RX ORDER — VENLAFAXINE HYDROCHLORIDE 37.5 MG/1
CAPSULE, EXTENDED RELEASE ORAL
COMMUNITY
Start: 2023-07-24

## 2023-10-07 RX ORDER — FAMOTIDINE 40 MG/1
40 TABLET, FILM COATED ORAL NIGHTLY
COMMUNITY
End: 2024-03-18

## 2023-10-07 RX ORDER — CLOBETASOL PROPIONATE 0.5 MG/G
CREAM TOPICAL
COMMUNITY
Start: 2016-11-18

## 2023-10-09 ENCOUNTER — HOSPITAL ENCOUNTER (OUTPATIENT)
Dept: RADIATION ONCOLOGY | Facility: HOSPITAL | Age: 73
Setting detail: RADIATION/ONCOLOGY SERIES
Discharge: STILL ADMITTED OR ADMINISTRATIVE | End: 2023-10-09
Payer: MEDICARE

## 2023-10-09 DIAGNOSIS — C54.0 MALIGNANT NEOPLASM OF ISTHMUS UTERI (MULTI): ICD-10-CM

## 2023-10-09 LAB
RAD ONC MSQ ACTUAL FRACTIONS DELIVERED: 4
RAD ONC MSQ ACTUAL SESSION DELIVERED DOSE: 600 CGRAY
RAD ONC MSQ ACTUAL TOTAL DOSE: 2400 CGRAY
RAD ONC MSQ ELAPSED DAYS: 19
RAD ONC MSQ LAST DATE: NORMAL
RAD ONC MSQ PRESCRIBED FRACTIONAL DOSE: 600 CGRAY
RAD ONC MSQ PRESCRIBED NUMBER OF FRACTIONS: 5
RAD ONC MSQ PRESCRIBED TECHNIQUE: NORMAL
RAD ONC MSQ PRESCRIBED TOTAL DOSE: 3000 CGRAY
RAD ONC MSQ PRESCRIPTION PATTERN COMMENT: NORMAL
RAD ONC MSQ START DATE: NORMAL
RAD ONC MSQ TREATMENT COURSE NUMBER: 1
RAD ONC MSQ TREATMENT SITE: NORMAL

## 2023-10-09 PROCEDURE — 77770 HDR RDNCL NTRSTL/ICAV BRCHTX: CPT | Performed by: STUDENT IN AN ORGANIZED HEALTH CARE EDUCATION/TRAINING PROGRAM

## 2023-10-09 PROCEDURE — 77332 RADIATION TREATMENT AID(S): CPT | Performed by: STUDENT IN AN ORGANIZED HEALTH CARE EDUCATION/TRAINING PROGRAM

## 2023-10-09 PROCEDURE — C1717 BRACHYTX, NON-STR,HDR IR-192: HCPCS | Performed by: STUDENT IN AN ORGANIZED HEALTH CARE EDUCATION/TRAINING PROGRAM

## 2023-10-09 PROCEDURE — 57156 INS VAG BRACHYTX DEVICE: CPT | Performed by: STUDENT IN AN ORGANIZED HEALTH CARE EDUCATION/TRAINING PROGRAM

## 2023-10-09 PROCEDURE — 77280 THER RAD SIMULAJ FIELD SMPL: CPT | Performed by: STUDENT IN AN ORGANIZED HEALTH CARE EDUCATION/TRAINING PROGRAM

## 2023-10-09 NOTE — PROGRESS NOTES
Patient Name: Roseanne Lira  MRN: [id number]  : 1950  Age: 73 y.o.  Diagnosis: No diagnosis found.  Date: 10/09/23     GYN HDR Nursing Flowsheet    Fraction: 4  Time: 1:56 PM  Assessment: Pt is here for fx 4, Bladder is full, pt dressed for treatment. No questions or concerns at this time.     1335 - Escorted/transported patient from exam room after verifying patient name and birth date.  1340 - Dr. Mejia in room and applicator connected to HDR unit without difficulty.  1344 - Treatment started without difficulty.  1348 - Treatment completed without difficulty.  1350 - Dr. Mejia in room applicator/transfer cables removed without difficulty.   N/a  - Reviewed discharge instructions with patient: to call with excessive bleeding, or discharge.  1355 - Appointment card given for next treatment/follow-up on 10/11/23 at 0945.  1355 - Discharged to home/floor in apparent stable condition.  N/a - IV/ heplock discontinued    Additional Notes: Pt juan antonio treatment well.      By: Jaida Russo RN      Electronically signed by Jaida Russo RN on 10/09/23 at 1:56 PM

## 2023-10-11 ENCOUNTER — HOSPITAL ENCOUNTER (OUTPATIENT)
Dept: RADIATION ONCOLOGY | Facility: HOSPITAL | Age: 73
Setting detail: RADIATION/ONCOLOGY SERIES
Discharge: STILL ADMITTED OR ADMINISTRATIVE | End: 2023-10-11
Payer: MEDICARE

## 2023-10-11 ENCOUNTER — DOCUMENTATION (OUTPATIENT)
Dept: RADIATION ONCOLOGY | Facility: HOSPITAL | Age: 73
End: 2023-10-11

## 2023-10-11 DIAGNOSIS — C54.0 MALIGNANT NEOPLASM OF ISTHMUS UTERI (MULTI): ICD-10-CM

## 2023-10-11 LAB
RAD ONC MSQ ACTUAL FRACTIONS DELIVERED: 5
RAD ONC MSQ ACTUAL SESSION DELIVERED DOSE: 600 CGRAY
RAD ONC MSQ ACTUAL TOTAL DOSE: 3000 CGRAY
RAD ONC MSQ ELAPSED DAYS: 21
RAD ONC MSQ LAST DATE: NORMAL
RAD ONC MSQ PRESCRIBED FRACTIONAL DOSE: 600 CGRAY
RAD ONC MSQ PRESCRIBED NUMBER OF FRACTIONS: 5
RAD ONC MSQ PRESCRIBED TECHNIQUE: NORMAL
RAD ONC MSQ PRESCRIBED TOTAL DOSE: 3000 CGRAY
RAD ONC MSQ PRESCRIPTION PATTERN COMMENT: NORMAL
RAD ONC MSQ START DATE: NORMAL
RAD ONC MSQ TREATMENT COURSE NUMBER: 1
RAD ONC MSQ TREATMENT SITE: NORMAL

## 2023-10-11 PROCEDURE — 77280 THER RAD SIMULAJ FIELD SMPL: CPT | Performed by: STUDENT IN AN ORGANIZED HEALTH CARE EDUCATION/TRAINING PROGRAM

## 2023-10-11 PROCEDURE — 57156 INS VAG BRACHYTX DEVICE: CPT | Performed by: STUDENT IN AN ORGANIZED HEALTH CARE EDUCATION/TRAINING PROGRAM

## 2023-10-11 PROCEDURE — 77770 HDR RDNCL NTRSTL/ICAV BRCHTX: CPT | Performed by: STUDENT IN AN ORGANIZED HEALTH CARE EDUCATION/TRAINING PROGRAM

## 2023-10-11 PROCEDURE — 77332 RADIATION TREATMENT AID(S): CPT | Performed by: STUDENT IN AN ORGANIZED HEALTH CARE EDUCATION/TRAINING PROGRAM

## 2023-10-11 PROCEDURE — 77336 RADIATION PHYSICS CONSULT: CPT | Mod: 59 | Performed by: STUDENT IN AN ORGANIZED HEALTH CARE EDUCATION/TRAINING PROGRAM

## 2023-10-11 PROCEDURE — C1717 BRACHYTX, NON-STR,HDR IR-192: HCPCS | Performed by: STUDENT IN AN ORGANIZED HEALTH CARE EDUCATION/TRAINING PROGRAM

## 2023-10-11 NOTE — PROGRESS NOTES
Patient Name: Roseanne Lira  MRN: [id number]  : 1950  Age: 73 y.o.  Diagnosis: No diagnosis found.  Date: 10/11/23     GYN HDR Nursing Flowsheet    Fraction: 5  Time: 1000  Assessment: Pt arrived for fraction .  Reports having several small loose bowel movements yesterday. No urinary symptoms or vaginal symptoms. Pt. reported  feeling nauseated upon awakening this morning but resolved without intervention.     1017 - Escorted/transported patient from exam room after verifying patient name and birth date.  1025 - Dr. Mejia in room and applicator connected to HDR unit without difficulty.  1032 - Treatment started without difficulty.  1037 - Treatment completed without difficulty.  1039 - Dr. Kamara in room applicator/transfer cables removed without difficulty.  1040 - Reviewed discharge instructions with patient: to call with excessive bleeding, or discharge.  1040 - Appointment card given for next treatment/follow-up on 11/15/23 at 1130.  1045 - Discharged to home/floor in apparent stable condition.    Additional Notes: Pt instructed to call our office with any concerns or complaints.        By: Rocío Montano RN      Electronically signed by Rocío Montano RN on 10/11/23 at 11:51 AM

## 2023-10-25 ENCOUNTER — APPOINTMENT (OUTPATIENT)
Dept: RADIATION ONCOLOGY | Facility: HOSPITAL | Age: 73
End: 2023-10-25

## 2023-11-08 ENCOUNTER — APPOINTMENT (OUTPATIENT)
Dept: RADIATION ONCOLOGY | Facility: HOSPITAL | Age: 73
End: 2023-11-08

## 2023-11-14 ENCOUNTER — TELEPHONE (OUTPATIENT)
Dept: RADIATION ONCOLOGY | Facility: HOSPITAL | Age: 73
End: 2023-11-14
Payer: MEDICARE

## 2023-11-15 ENCOUNTER — HOSPITAL ENCOUNTER (OUTPATIENT)
Dept: RADIATION ONCOLOGY | Facility: HOSPITAL | Age: 73
Setting detail: RADIATION/ONCOLOGY SERIES
Discharge: HOME | End: 2023-11-15
Payer: MEDICARE

## 2023-11-15 VITALS
OXYGEN SATURATION: 96 % | DIASTOLIC BLOOD PRESSURE: 75 MMHG | BODY MASS INDEX: 31.94 KG/M2 | HEART RATE: 72 BPM | TEMPERATURE: 96.6 F | WEIGHT: 191.69 LBS | RESPIRATION RATE: 18 BRPM | HEIGHT: 65 IN | SYSTOLIC BLOOD PRESSURE: 127 MMHG

## 2023-11-15 DIAGNOSIS — C54.1 ENDOMETRIAL ADENOCARCINOMA (MULTI): Primary | ICD-10-CM

## 2023-11-15 PROCEDURE — 99214 OFFICE O/P EST MOD 30 MIN: CPT | Performed by: NURSE PRACTITIONER

## 2023-11-15 ASSESSMENT — ENCOUNTER SYMPTOMS
MUSCULOSKELETAL NEGATIVE: 1
HEMATOLOGIC/LYMPHATIC NEGATIVE: 1
OCCASIONAL FEELINGS OF UNSTEADINESS: 0
RESPIRATORY NEGATIVE: 1
APPETITE CHANGE: 0
ABDOMINAL PAIN: 1
NEUROLOGICAL NEGATIVE: 1
NAUSEA: 0
ANAL BLEEDING: 0
DEPRESSION: 0
CARDIOVASCULAR NEGATIVE: 1
BLOOD IN STOOL: 0
CONSTIPATION: 0
LOSS OF SENSATION IN FEET: 0
PSYCHIATRIC NEGATIVE: 1
RECTAL PAIN: 0
ACTIVITY CHANGE: 0
UNEXPECTED WEIGHT CHANGE: 0
CHILLS: 0
FATIGUE: 0
VOMITING: 0
DIAPHORESIS: 0
DIARRHEA: 0
FEVER: 0

## 2023-11-15 ASSESSMENT — COLUMBIA-SUICIDE SEVERITY RATING SCALE - C-SSRS
6. HAVE YOU EVER DONE ANYTHING, STARTED TO DO ANYTHING, OR PREPARED TO DO ANYTHING TO END YOUR LIFE?: NO
2. HAVE YOU ACTUALLY HAD ANY THOUGHTS OF KILLING YOURSELF?: NO
1. IN THE PAST MONTH, HAVE YOU WISHED YOU WERE DEAD OR WISHED YOU COULD GO TO SLEEP AND NOT WAKE UP?: NO

## 2023-11-15 ASSESSMENT — PATIENT HEALTH QUESTIONNAIRE - PHQ9
1. LITTLE INTEREST OR PLEASURE IN DOING THINGS: NOT AT ALL
SUM OF ALL RESPONSES TO PHQ9 QUESTIONS 1 AND 2: 0
2. FEELING DOWN, DEPRESSED OR HOPELESS: NOT AT ALL

## 2023-11-15 ASSESSMENT — PAIN SCALES - GENERAL: PAINLEVEL: 0-NO PAIN

## 2023-11-15 NOTE — PROGRESS NOTES
Patient ID: [id number]     Interval History:    Diagnosis: 8/18/23: 73 year old female with FIGO  Stage IB (pT1b, pN0, cM0) G1 endometrial adenocarcinoma of the uterus, p53 wt, MMRd, extensive LVSI with negative sentinel node involvement      Cancer History:   7/25/23: D&C showed endometrial adenocarcinoma, endometriod type  8/18/23: NISH and BSO, sLND with uterosacral ligament suspension; showed FIGO G1 with 75% myometrial invasion 0/2LNs +  9/7/23: Appt with Dr. Magallon; adding to gyn TB     Dr. Magallon had referred the patient for evaluation and treatment for radiation.    HDR to the vaginal cuff completed on 10/11/23.    History of presenting illness    Roseanne Lira is a 73 y.o. female who presents today for follow up one month s/p HDR to the vaginal cuff. Patient is doing well. Energy is baseline. Appetite is good. Weight stable. Endorses some abdominal pain where her pants sit over a lap site from her hysterectomy. Denies pelvic pain, vaginal bleeding or discharge. No urinary complaints. Bowels baseline. Denies rectal bleeding. Patient has a dilator she ordered from [employer] at home she has begun using. Follow up with Dr. Pack on Monday.     Review of systems:  Review of Systems   Constitutional:  Negative for activity change, appetite change, chills, diaphoresis, fatigue, fever and unexpected weight change.   HENT: Negative.     Respiratory: Negative.     Cardiovascular: Negative.    Gastrointestinal:  Positive for abdominal pain (lap site where pants waist band sits.). Negative for anal bleeding, blood in stool, constipation, diarrhea, nausea, rectal pain and vomiting.   Genitourinary: Negative.    Musculoskeletal: Negative.    Skin: Negative.    Neurological: Negative.    Hematological: Negative.    Psychiatric/Behavioral: Negative.         Past Medical history  She  has a past surgical history that includes Spine surgery (06/03/2013); Other surgical history (06/03/2013); Breast surgery (06/03/2013);  "Colonoscopy (06/03/2013); Other surgical history (10/19/2020); Tubal ligation (05/19/2015); Other surgical history (03/13/2017); Other surgical history (03/13/2017); MR angio head wo IV contrast (7/27/2020); MR angio neck wo IV contrast (7/27/2020); MR angio head wo IV contrast (5/18/2017); MR angio neck wo IV contrast (5/18/2017); CT angio neck (12/9/2022); and CT angio head w and wo IV contrast (12/9/2022).    Last recorded vital:  /75 (BP Location: Left arm, Patient Position: Sitting, BP Cuff Size: Large adult)   Pulse 72   Temp 35.9 °C (96.6 °F) (Temporal)   Resp 18   Ht 1.651 m (5' 5\")   Wt 87 kg (191 lb 11 oz)   SpO2 96%   BMI 31.90 kg/m²     Physical exam  Physical Exam  Constitutional:       General: She is not in acute distress.     Appearance: Normal appearance. She is not ill-appearing, toxic-appearing or diaphoretic.   HENT:      Head: Normocephalic.      Mouth/Throat:      Mouth: Mucous membranes are moist.      Pharynx: Oropharynx is clear.   Eyes:      Extraocular Movements: Extraocular movements intact.      Conjunctiva/sclera: Conjunctivae normal.      Pupils: Pupils are equal, round, and reactive to light.   Cardiovascular:      Rate and Rhythm: Normal rate and regular rhythm.      Pulses: Normal pulses.      Heart sounds: Normal heart sounds.   Pulmonary:      Effort: Pulmonary effort is normal.      Breath sounds: Normal breath sounds.   Abdominal:      General: Bowel sounds are normal. There is no distension.      Palpations: Abdomen is soft. There is no mass.      Tenderness: There is no abdominal tenderness. There is no guarding or rebound.      Hernia: No hernia is present.   Genitourinary:     General: Normal vulva.      Pubic Area: No rash.       Labia:         Right: No rash, tenderness, lesion or injury.         Left: No rash, tenderness, lesion or injury.       Urethra: No prolapse, urethral pain, urethral swelling or urethral lesion.      Vagina: No vaginal discharge.      " Comments: Radiation changes noted. No masses or abnormal bleeding or discharge. Small amount of scar tissue at introitus.   Musculoskeletal:         General: Normal range of motion.      Cervical back: Normal range of motion and neck supple.   Lymphadenopathy:      Lower Body: No right inguinal adenopathy. No left inguinal adenopathy.   Skin:     General: Skin is warm and dry.   Neurological:      General: No focal deficit present.      Mental Status: She is alert and oriented to person, place, and time.   Psychiatric:         Mood and Affect: Mood normal.         Behavior: Behavior normal.         Thought Content: Thought content normal.         Judgment: Judgment normal.               Plan:  Assessment/Plan     73 year old female one month s/p HDR to the vaginal cuff. Patient is doing well with no acute complaints related to treatment. Discussed treatment, side effects, follow up and when to call. Patient using dilator she has purchased without issues. Offered to review instructions. Patient declined. Patient to use 3x per week for 5-10 min at a time. Continue follow up with Gyn as scheduled. Patient to return to clinic in 6 months unless needs to be seen sooner. Instructed to call with questions or concerns.

## 2023-11-16 ENCOUNTER — APPOINTMENT (OUTPATIENT)
Dept: OBSTETRICS AND GYNECOLOGY | Facility: CLINIC | Age: 73
End: 2023-11-16
Payer: MEDICARE

## 2023-11-20 ENCOUNTER — OFFICE VISIT (OUTPATIENT)
Dept: OBSTETRICS AND GYNECOLOGY | Facility: CLINIC | Age: 73
End: 2023-11-20
Payer: MEDICARE

## 2023-11-20 VITALS
BODY MASS INDEX: 31.49 KG/M2 | SYSTOLIC BLOOD PRESSURE: 122 MMHG | DIASTOLIC BLOOD PRESSURE: 80 MMHG | HEIGHT: 65 IN | WEIGHT: 189 LBS

## 2023-11-20 DIAGNOSIS — N39.9 URINARY DISORDER: Primary | ICD-10-CM

## 2023-11-20 DIAGNOSIS — B37.2 SKIN YEAST INFECTION: ICD-10-CM

## 2023-11-20 PROCEDURE — 3074F SYST BP LT 130 MM HG: CPT | Performed by: OBSTETRICS & GYNECOLOGY

## 2023-11-20 PROCEDURE — 3008F BODY MASS INDEX DOCD: CPT | Performed by: OBSTETRICS & GYNECOLOGY

## 2023-11-20 PROCEDURE — 1159F MED LIST DOCD IN RCRD: CPT | Performed by: OBSTETRICS & GYNECOLOGY

## 2023-11-20 PROCEDURE — 99213 OFFICE O/P EST LOW 20 MIN: CPT | Performed by: OBSTETRICS & GYNECOLOGY

## 2023-11-20 PROCEDURE — 51798 US URINE CAPACITY MEASURE: CPT | Performed by: OBSTETRICS & GYNECOLOGY

## 2023-11-20 PROCEDURE — 1036F TOBACCO NON-USER: CPT | Performed by: OBSTETRICS & GYNECOLOGY

## 2023-11-20 PROCEDURE — 3079F DIAST BP 80-89 MM HG: CPT | Performed by: OBSTETRICS & GYNECOLOGY

## 2023-11-20 PROCEDURE — 1126F AMNT PAIN NOTED NONE PRSNT: CPT | Performed by: OBSTETRICS & GYNECOLOGY

## 2023-11-20 ASSESSMENT — ENCOUNTER SYMPTOMS
NEUROLOGICAL NEGATIVE: 1
ENDOCRINE NEGATIVE: 1
EYES NEGATIVE: 1
CARDIOVASCULAR NEGATIVE: 1
CONSTITUTIONAL NEGATIVE: 1
RESPIRATORY NEGATIVE: 1
MUSCULOSKELETAL NEGATIVE: 1
GASTROINTESTINAL NEGATIVE: 1
PSYCHIATRIC NEGATIVE: 1

## 2023-11-20 ASSESSMENT — PATIENT HEALTH QUESTIONNAIRE - PHQ9
SUM OF ALL RESPONSES TO PHQ9 QUESTIONS 1 AND 2: 0
2. FEELING DOWN, DEPRESSED OR HOPELESS: NOT AT ALL
1. LITTLE INTEREST OR PLEASURE IN DOING THINGS: NOT AT ALL

## 2023-11-20 ASSESSMENT — PAIN SCALES - GENERAL: PAINLEVEL: 0-NO PAIN

## 2023-11-20 NOTE — PROGRESS NOTES
Urogynecology  Provider:  Anastacia Pack MD  [phone]      ASSESSMENT AND PLAN:   73 year old female with uterovaginal prolapse and MIKALA. Comorbidities include: Stage 1 endometrial cancer managed by Dr. Demetria Trent in Gyn/Onc and of note she recently completed RT tx. History of breast cancer and did not take Tamoxifen.     Diagnoses:   #1 Uterovaginal prolapse  #2 Stress urinary incontinence   #3 Yeast vaginitis    Plan:   1. Uterovaginal prolapse, MIKALA   - 8/18/2023: TLH, BS, and bilateral SLND by Dr. Demetria Trent with combined case LSC, USLS, MUS, and cystoscopy by Dr. Pack.   - PVR = 0mL by bladder U/S.  - No evidence of POP or MIKALA by clinical exam and subjectively per the patient. Upon exam her midurethral sling in place with no mesh erosion visualized or palpated with well-healed sling exit sites. Well-healed uterosacral ligament suspension, well suspended apex without prolapse. Cuff is intact and well-healed. V-loc sutures have dissolved. No masses and no foreign body confirmed on bimanual exam.   - She is well healed and is not having any issues.     2. Yeast vaginitis  - Sent Rx of Nystatin cream to apply topically to her vulva up to BID or PRN itching.     3. Endometrial cancer, stage 1  - Followed by Dr. Demetria Trent in Gyn/Onc.   - Recently completed RT and is now pursuing surveillance visits q 3 months but is presently BINH.     Follow up in 6 months with Dr. Pack.     Scribe Attestation  By signing my name below, I, Bryce Freeman, attest that this documentation has been prepared under the direction and in the presence of Anastacia Pack MD.    Problem List Items Addressed This Visit    None       I spent a total of eConsult Time: 25 minutes in face to face and non face to face time.        Anastacia Pack MD      HISTORY OF PRESENT ILLNESS:   72yo presenting in postoperative follow up s/p TLH, BS, and bilateral SLND by Dr. Demetria Trent with combined case LSC, USLS, MUS, and  cystoscopy by Dr. Pack on 8/18/2023.     Record Review:   - Last seen 8/2023  Assessment:   73 year old female being assessed for POP, MIKALA and endometrial cancer      Diagnoses:   #1 POP  #2 MIKALA  #3 Endometrial cancer      Plan:   1. POP, MIKALA   - S/P 8/18/23: TLH, BS, bilateral SLND by Dr. Trent LSC, USLS, MUS, cystoscopy by Dr. Pack   - No evidence of POP or MIKALA by clinical exam and subjectively per the patient.   - Advised pt she could lift 10 lbs and to wait two more weeks before doing water exercises   - doing well     2. Endometrial cancer  - Pathology confirms endometrial cancer.   - Follow up as scheduled 9/7/23 with Dr. Trent     Followed with Rad Onc for RT  Completed RT TX  Using dilator    Postoperative Symptoms:   - Denies experiencing POP.   - No MIKALA.   - She reports having to return to physical therapy from being placed in the lithotomy position during surgery for an extended period of time but her knee pain is slowly improving with time and completing PT. The patient had a hyaluronic acid injection in her L knee in hopes of preventing arthoplasty.   - She feels that her energy is returned after surgery now and is able to walk around her neighborhood, no fatigue.     Vulvar Symptoms:  - Endorses vulvar irritation and itching. She was previously evaluated by dermatology and was advised to use topical zinc-oxide for this.     Bowel Symptoms:   - Denies experiencing constipation.     Sexual Activity:  - Not currently sexually active.   - She has been using a vaginal dilator 3x/week to prevent stenosis.     Oncology History:  - Final surgical pathology showed: Endometrial carcinoma, endometrioid type, FIGO grade 1, with 75% myometrial invasion.   - She reports completing RT and overall states she tolerated this tx well.   - The patient now reports being monitored with surveillance visits as she is not requiring any further tx. Planning to alternate q 3 months between urogynecology and gyn/onc.      Past Medical History:    Past Medical History:   Diagnosis Date    Age-related nuclear cataract, left eye 03/25/2017    Age-related nuclear cataract of left eye    Age-related nuclear cataract, right eye 03/25/2017    Age-related nuclear cataract of right eye    Allergy status to unspecified drugs, medicaments and biological substances     History of seasonal allergies    Cortical age-related cataract, left eye 03/25/2017    Cortical age-related cataract of left eye    Cortical age-related cataract, right eye 03/25/2017    Cortical age-related cataract of right eye    Diarrhea, unspecified 09/25/2018    Acute diarrhea    Dizziness and giddiness 06/10/2020    Postural dizziness with presyncope    Elevated blood-pressure reading, without diagnosis of hypertension 10/16/2019    Prehypertension    Encounter for general adult medical examination without abnormal findings 09/14/2020    Preventative health care    Encounter for other preprocedural examination     Preoperative exam for gynecologic surgery    Encounter for screening mammogram for malignant neoplasm of breast 06/03/2013    Visit for screening mammogram    Epigastric pain 01/24/2020    Abdominal pain, acute, epigastric    Headache, unspecified 10/19/2020    Sinus headache    Intraductal carcinoma in situ of unspecified breast 04/01/2015    DCIS (ductal carcinoma in situ) of breast    Leukoplakia of vulva     Lichen sclerosus of female genitalia    Long term (current) use of systemic steroids 08/27/2019    Current chronic use of systemic steroids    Other chest pain 10/16/2019    Atypical chest pain    Other conditions influencing health status 10/25/2019    Chronic use of steroids    Other disturbances of skin sensation 05/19/2015    Burning sensation    Other malaise 08/23/2018    Malaise and fatigue    Other pericardial effusion (noninflammatory) 04/07/2015    Pericardial effusion    Personal history of malignant neoplasm of bone 07/11/2019    History of  malignant neoplasm of bone    Personal history of other diseases of the respiratory system 04/01/2015    History of asthma    Personal history of other diseases of the respiratory system 04/05/2017    History of acute bronchitis    Personal history of other diseases of the respiratory system 01/03/2014    History of upper respiratory infection    Personal history of other specified conditions 03/30/2015    History of abdominal pain    Personal history of urinary (tract) infections 06/23/2015    History of urinary tract infection    Personal history of urinary (tract) infections 04/05/2017    History of acute cystitis    Unspecified visual disturbance 07/07/2020    Visual disturbances    Urinary tract infection, site not specified 05/13/2021    Acute UTI       Past Surgical History:     Past Surgical History:   Procedure Laterality Date    BREAST SURGERY  06/03/2013    Breast Surgery    COLONOSCOPY  06/03/2013    Complete Colonoscopy    CT ANGIO NECK  12/9/2022    CT NECK ANGIO W AND WO IV CONTRAST 12/9/2022 [facility]    CT HEAD ANGIO W AND WO IV CONTRAST  12/9/2022    CT HEAD ANGIO W AND WO IV CONTRAST 12/9/2022 [facility]    MR HEAD ANGIO WO IV CONTRAST  7/27/2020    MR HEAD ANGIO WO IV CONTRAST 7/27/2020 [facility]    MR HEAD ANGIO WO IV CONTRAST  5/18/2017    MR HEAD ANGIO WO IV CONTRAST 5/18/2017 [facility]    MR NECK ANGIO WO IV CONTRAST  7/27/2020    MR NECK ANGIO WO IV CONTRAST 7/27/2020 [facility]    MR NECK ANGIO WO IV CONTRAST  5/18/2017    MR NECK ANGIO WO IV CONTRAST 5/18/2017 [facility]    OTHER SURGICAL HISTORY  06/03/2013    Craniotomy Supratentorial Excision Of Meningioma    OTHER SURGICAL HISTORY  10/19/2020    Cataract surgery    OTHER SURGICAL HISTORY  03/13/2017    Arthrodesis Cervical    OTHER SURGICAL HISTORY  03/13/2017    Craniotomy Infratentorial Excision Of Meningioma    SPINE SURGERY  06/03/2013    Spine Repair    TUBAL LIGATION   05/19/2015    Tubal Ligation       Medications:     Prior to Admission medications    Medication Sig Start Date End Date Taking? Authorizing Provider   albuterol 90 mcg/actuation inhaler Inhale 2 puffs. Q6-8hrs prn sob 5/29/13   Historical Provider, MD   amLODIPine (Norvasc) 2.5 mg tablet Take 1 tablet (2.5 mg) by mouth 2 times a day.    Historical Provider, MD   amoxicillin (Amoxil) 500 mg capsule Take 4 capsules (2,000 mg) by mouth. 1 HOUR PRIOR TO DENTAL PROCEDURE 6/1/23   Historical Provider, MD   calcium carbonate-vitamin D3 600 mg-10 mcg (400 unit) chewable tablet Chew. 6/10/20   Historical Provider, MD   calcium carbonate-vitamin D3 600 mg-20 mcg (800 unit) tablet Take 1 tablet by mouth once daily.    Historical Provider, MD   cetirizine (ZyrTEC) 10 mg tablet Take 1 tablet (10 mg) by mouth once daily. 9/16/13   Historical Provider, MD   cholecalciferol (Vitamin D-3) 50 mcg (2,000 unit) capsule Take 3 capsules (150 mcg) by mouth early in the morning..    Historical Provider, MD   clobetasol (Temovate) 0.05 % cream 1 Application 11/18/16   Historical Provider, MD   clotrimazole-betamethasone (Lotrisone) cream 1 Application 4/15/15   Historical Provider, MD   cyanocobalamin (Vitamin B-12) 100 mcg tablet Take 1 tablet (100 mcg) by mouth once daily.    Historical Provider, MD   docusate sodium (Colace) 100 mg capsule 1 capsule (100 mg). 4/19/23   Historical Provider, MD   famotidine (Pepcid) 20 mg tablet Take 1 tablet (20 mg) by mouth once daily in the morning. 8/17/22   Historical Provider, MD   famotidine (Pepcid) 40 mg tablet Take 1 tablet (40 mg) by mouth once daily at bedtime.    Historical Provider, MD   fluticasone (Flonase) 50 mcg/actuation nasal spray Administer 1 spray into affected nostril(s) once daily as needed.    Historical Provider, MD   hydroCHLOROthiazide (Microzide) 12.5 mg capsule Take 1 capsule (12.5 mg) by mouth once daily in the morning.    Historical Provider, MD   hydroxychloroquine  (Plaquenil) 200 mg tablet Take 1 tablet (200 mg) by mouth once daily.    Historical Provider, MD   ketoconazole (NIZOral) 2 % shampoo 1 application 3/31/15   Historical Provider, MD   LORazepam (Ativan) 0.5 mg tablet Take 1 tablet (0.5 mg) by mouth once daily as needed.    Historical Provider, MD   MAGNESIUM GLUCONATE ORAL Take 500 mg by mouth once daily.    Historical Provider, MD   pantoprazole (ProtoNix) 40 mg EC tablet Take 1 tablet (40 mg) by mouth once daily in the morning. Take before meals. 4/19/23   Historical Provider, MD   venlafaxine XR (Effexor-XR) 150 mg 24 hr capsule Take 1 capsule (150 mg) by mouth once daily.    Historical Provider, MD   venlafaxine XR (Effexor-XR) 37.5 mg 24 hr capsule TAKE ONE ALONG WITH 150 MG DAILY 7/24/23   Historical Provider, MD   vitamin E mixed 400 unit capsule Take 1 capsule by mouth once daily.    Historical Provider, MD MULLER  Review of Systems   Constitutional: Negative.    HENT: Negative.     Eyes: Negative.    Respiratory: Negative.     Cardiovascular: Negative.    Gastrointestinal: Negative.    Endocrine: Negative.    Genitourinary: Negative.    Musculoskeletal: Negative.    Neurological: Negative.    Psychiatric/Behavioral: Negative.          Blood, Urine   Date Value Ref Range Status   04/30/2021 NEGATIVE NEGATIVE Final     Nitrite, Urine   Date Value Ref Range Status   04/30/2021 NEGATIVE NEGATIVE Final     Urobilinogen, Urine   Date Value Ref Range Status   04/30/2021 <2.0 0.0 - 1.9 mg/dL Final       PHYSICAL EXAM:    There were no vitals taken for this visit.    PVR = 0mL by bladder scan.     Declines chaperone for physical exam.    GENERAL:   Well developed, well nourished, in no apparent distress.   Neurologic/Psychiatric:  Awake, Alert and Oriented times 3.  Affect normal.     GENITAL/URINARY:     External Genitalia:  The patient has normal appearing external genitalia, normal skenes and bartholins glands, and a normal hair distribution.  It is  non-tender with appropriate sensation. Vulvar yeast dermatitis is present with erythematous skin and excoriations from scratching.     Urethral Meatus:  Size normal, Location normal, Lesions absent, Prolapse absent,      Urethra:  Fullness absent, Masses absent. Midurethral sling in place with no mesh erosion visualized or palpated. Sling exit sites are well-healed.     Bladder:  Fullness absent, Masses absent, Tenderness absent.     Vagina:  General appearance normal, Estrogen effect normal, Discharge absent, Lesions absent. Well-healed uterosacral ligament suspension, well suspended apex without prolapse. Cuff is intact and well-healed. V-loc sutures have dissolved. No masses and no foreign body confirmed on bimanual exam.     Cervix: surgically absent.   Uterus:  surgically absent     Anus/Perineum:  Normal Perineum    Stress urinary incontinence not demonstrable.         POP-Q:  Stage: 0  Position: sitting    Aa: -3       Ba:  C: -9   Gh:  Pb:  TVL: 9         Ap: -3 Bp:  D:x               Data and DIAGNOSTIC STUDIES REVIEWED   No results found.   Lab Results   Component Value Date    URINECULTURE CANCELED 08/14/2023      Lab Results   Component Value Date    GLUCOSE 141 (H) 08/18/2023    CALCIUM 8.7 08/18/2023     08/18/2023    K 3.8 08/18/2023    CO2 29 08/18/2023    CL 98 08/18/2023    BUN 21 08/18/2023    CREATININE 1.01 08/18/2023     Lab Results   Component Value Date    WBC 19.0 (H) 08/18/2023    HGB 13.4 08/18/2023    HCT 40.3 08/18/2023    MCV 82 08/18/2023     08/18/2023      Agree with above   Doing well overall.  I Dr. Pack, personally performed the services described in the documentation which was scribed virtually and confirm it is both complete and accurate.  Anastacia Pack MD

## 2023-11-21 DIAGNOSIS — B37.2 SKIN YEAST INFECTION: Primary | ICD-10-CM

## 2023-11-21 RX ORDER — NYSTATIN 100000 U/G
CREAM TOPICAL 2 TIMES DAILY
Qty: 30 G | Refills: 3 | Status: SHIPPED | OUTPATIENT
Start: 2023-11-21 | End: 2024-03-27 | Stop reason: SDUPTHER

## 2023-11-25 RX ORDER — NYSTATIN 100000 U/G
CREAM TOPICAL 2 TIMES DAILY
Qty: 30 G | Refills: 3 | Status: SHIPPED | OUTPATIENT
Start: 2023-11-25 | End: 2024-05-02 | Stop reason: ALTCHOICE

## 2023-11-26 DIAGNOSIS — I10 ESSENTIAL (PRIMARY) HYPERTENSION: ICD-10-CM

## 2023-11-28 RX ORDER — HYDROCHLOROTHIAZIDE 12.5 MG/1
12.5 CAPSULE ORAL EVERY MORNING
Qty: 90 CAPSULE | Refills: 0 | Status: SHIPPED | OUTPATIENT
Start: 2023-11-28 | End: 2024-02-26

## 2023-12-01 ENCOUNTER — APPOINTMENT (OUTPATIENT)
Dept: ORTHOPEDIC SURGERY | Facility: HOSPITAL | Age: 73
End: 2023-12-01
Payer: MEDICARE

## 2023-12-12 ENCOUNTER — TELEPHONE (OUTPATIENT)
Dept: ORTHOPEDIC SURGERY | Facility: HOSPITAL | Age: 73
End: 2023-12-12
Payer: MEDICARE

## 2023-12-12 ENCOUNTER — TRANSCRIBE ORDERS (OUTPATIENT)
Dept: ORTHOPEDIC SURGERY | Facility: CLINIC | Age: 73
End: 2023-12-12
Payer: MEDICARE

## 2023-12-12 DIAGNOSIS — Z96.651 AFTERCARE FOLLOWING RIGHT KNEE JOINT REPLACEMENT SURGERY: Primary | ICD-10-CM

## 2023-12-12 DIAGNOSIS — Z47.1 AFTERCARE FOLLOWING RIGHT KNEE JOINT REPLACEMENT SURGERY: Primary | ICD-10-CM

## 2023-12-12 NOTE — TELEPHONE ENCOUNTER
PATIENT HAVING RIGHT KNEE PAIN.  SHE HAD R TKA REPLACEMENT 4/19/23.  PATIENT WANT AN X-RAY TAKEN TOMORROW MORNING DURING HER VISIT WITH DR. GARCIA.  THE PATIENT WOULD LIKE THE RESULTS OF HER X-RAY GIVEN OVER THE PHONE.   I HAVE ADDED THE X-RAY ORDER TO [facility].  PLEASE LET ME KNOW ONCE THE ORDER IS SIGNED.    THANKS  MARITZA LE

## 2023-12-13 ENCOUNTER — HOSPITAL ENCOUNTER (OUTPATIENT)
Dept: RADIOLOGY | Facility: HOSPITAL | Age: 73
Discharge: HOME | End: 2023-12-13
Payer: MEDICARE

## 2023-12-13 ENCOUNTER — OFFICE VISIT (OUTPATIENT)
Dept: ORTHOPEDIC SURGERY | Facility: HOSPITAL | Age: 73
End: 2023-12-13
Payer: MEDICARE

## 2023-12-13 DIAGNOSIS — Z96.651 AFTERCARE FOLLOWING RIGHT KNEE JOINT REPLACEMENT SURGERY: ICD-10-CM

## 2023-12-13 DIAGNOSIS — Z47.1 AFTERCARE FOLLOWING RIGHT KNEE JOINT REPLACEMENT SURGERY: ICD-10-CM

## 2023-12-13 DIAGNOSIS — M17.12 PRIMARY OSTEOARTHRITIS OF LEFT KNEE: Primary | ICD-10-CM

## 2023-12-13 PROCEDURE — 99213 OFFICE O/P EST LOW 20 MIN: CPT | Performed by: FAMILY MEDICINE

## 2023-12-13 PROCEDURE — 1159F MED LIST DOCD IN RCRD: CPT | Performed by: FAMILY MEDICINE

## 2023-12-13 PROCEDURE — 1126F AMNT PAIN NOTED NONE PRSNT: CPT | Performed by: FAMILY MEDICINE

## 2023-12-13 PROCEDURE — 3008F BODY MASS INDEX DOCD: CPT | Performed by: FAMILY MEDICINE

## 2023-12-13 PROCEDURE — 73562 X-RAY EXAM OF KNEE 3: CPT | Mod: RIGHT SIDE | Performed by: RADIOLOGY

## 2023-12-13 PROCEDURE — 73562 X-RAY EXAM OF KNEE 3: CPT | Mod: RT

## 2023-12-13 PROCEDURE — 1036F TOBACCO NON-USER: CPT | Performed by: FAMILY MEDICINE

## 2023-12-13 NOTE — PROGRESS NOTES
FUV: left knee.     Sports Medicine Office Note    Today's Date:  12/13/2023     HPI: Roseanne Lira is a 73 y.o. retired female here for follow-up of chronic left knee pain.       On 6/27/2023, she presented for evaluation of chronic left knee pain upon referral by Dr. Dillon.  She was recently evaluated for her pain while following up for right knee arthroplasty. She uses a cane for ambulation. She reports having gel injections into both knees in the past with Dr. Nice, over 6 years ago. This gave her good relief. She would like to try it again with her left knee. She denies interval injury or trauma since her last visit with our office. We agreed to pursue insurance approval for viscosupplementation to help her chronic left knee DJD pain. She will continue her current conservative treatment plan otherwise. She will follow-up when she has insurance approval.     On 9/29/2023, she returns for left knee viscosupplementation. Denies interval injury or trauma to the left knee. She has continued to do her home physical therapy exercises, which she feels have been helpful.   We agreed to proceed with a trial of viscosupplementation with Synvisc 1. She tolerated this well. Activity modifications were reviewed. She will follow-up in 8 weeks.     Today, 12/13/2023, she returns for 2-1/2-month follow-up of chronic left knee pain status post a trial of Synvisc hyaluronic acid injection.  She reports she is doing much better and is having less pain.  She did have a recent flareup 2 days ago after [place] but otherwise she is very happy with her improvement.  She denies interval injury or trauma.    She has no other complaints.    Physical Examination:   The RIGHT knee is nontender and stable. There is a well-healed scar.  The LEFT knee has no joint effusion. Patella crepitus is positive. There is no tenderness to the medial and lateral joint lines. Flexion and extension are without  mechanical blocking. There is no instability with stress testing.   Skin - no rashes, sores, or open lesions. Strength, sensory and vascular exams are otherwise normal. There is no clubbing, cyanosis or edema.  Gait is slightly antalgic and tandem.    Imaging:  === 12/13/23 ===    XR KNEE 3 VIEWS RIGHT    - Impression -  No hardware failure about the right total knee arthroplasty    Signed by: Paxton Talavera 12/14/2023 5:32 PM  Dictation workstation:   [id number]      Problem List Items Addressed This Visit             ICD-10-CM    Osteoarthritis of left knee - Primary M17.12    Relevant Orders    Referral to Physical Therapy       Assessment and Plan:     We reviewed the exam and x-ray findings and discussed the conservative and surgical treatment options. We agreed that she has responded very well to the trial of viscosupplementation with Synvisc at her left knee.  She understands this can be repeated every 6 months or later if needed.  We agreed upon a referral to physical therapy for strengthening of her lower extremities.  She will continue her current conservative treatment plan.  She will follow-up when her pain returns.  **This note was dictated using Dragon speech recognition software and was not corrected for spelling or grammatical errors**.    Palomo Stephenson MD  Sports Medicine Specialist  [facility] [place]

## 2023-12-19 ENCOUNTER — OFFICE VISIT (OUTPATIENT)
Dept: PRIMARY CARE | Facility: CLINIC | Age: 73
End: 2023-12-19
Payer: MEDICARE

## 2023-12-19 VITALS
HEART RATE: 81 BPM | DIASTOLIC BLOOD PRESSURE: 68 MMHG | OXYGEN SATURATION: 96 % | HEIGHT: 65 IN | SYSTOLIC BLOOD PRESSURE: 108 MMHG | BODY MASS INDEX: 31.74 KG/M2 | WEIGHT: 190.48 LBS | RESPIRATION RATE: 16 BRPM

## 2023-12-19 DIAGNOSIS — R09.89 LABILE HYPERTENSION: ICD-10-CM

## 2023-12-19 DIAGNOSIS — M79.7 FIBROMYALGIA: ICD-10-CM

## 2023-12-19 DIAGNOSIS — N28.9 MILD RENAL INSUFFICIENCY: ICD-10-CM

## 2023-12-19 DIAGNOSIS — D05.11 DUCTAL CARCINOMA IN SITU (DCIS) OF RIGHT BREAST: ICD-10-CM

## 2023-12-19 DIAGNOSIS — J01.80 ACUTE NON-RECURRENT SINUSITIS OF OTHER SINUS: ICD-10-CM

## 2023-12-19 DIAGNOSIS — Z12.31 VISIT FOR SCREENING MAMMOGRAM: Primary | ICD-10-CM

## 2023-12-19 DIAGNOSIS — M35.3 PMR (POLYMYALGIA RHEUMATICA) (MULTI): ICD-10-CM

## 2023-12-19 PROBLEM — J01.90 ACUTE NON-RECURRENT SINUSITIS: Status: ACTIVE | Noted: 2023-12-19

## 2023-12-19 PROCEDURE — 1159F MED LIST DOCD IN RCRD: CPT | Performed by: INTERNAL MEDICINE

## 2023-12-19 PROCEDURE — 3008F BODY MASS INDEX DOCD: CPT | Performed by: INTERNAL MEDICINE

## 2023-12-19 PROCEDURE — 99214 OFFICE O/P EST MOD 30 MIN: CPT | Performed by: INTERNAL MEDICINE

## 2023-12-19 PROCEDURE — 3074F SYST BP LT 130 MM HG: CPT | Performed by: INTERNAL MEDICINE

## 2023-12-19 PROCEDURE — 1126F AMNT PAIN NOTED NONE PRSNT: CPT | Performed by: INTERNAL MEDICINE

## 2023-12-19 PROCEDURE — 1036F TOBACCO NON-USER: CPT | Performed by: INTERNAL MEDICINE

## 2023-12-19 PROCEDURE — 3078F DIAST BP <80 MM HG: CPT | Performed by: INTERNAL MEDICINE

## 2023-12-19 RX ORDER — AMOXICILLIN 500 MG/1
CAPSULE ORAL
Qty: 40 CAPSULE | Refills: 0 | Status: SHIPPED | OUTPATIENT
Start: 2023-12-19

## 2023-12-19 ASSESSMENT — ENCOUNTER SYMPTOMS
VOMITING: 0
LIGHT-HEADEDNESS: 0
DIARRHEA: 0
OCCASIONAL FEELINGS OF UNSTEADINESS: 0
LOSS OF SENSATION IN FEET: 0
DEPRESSION: 1

## 2023-12-19 ASSESSMENT — PATIENT HEALTH QUESTIONNAIRE - PHQ9
2. FEELING DOWN, DEPRESSED OR HOPELESS: MORE THAN HALF THE DAYS
SUM OF ALL RESPONSES TO PHQ QUESTIONS 1-9: 7
6. FEELING BAD ABOUT YOURSELF - OR THAT YOU ARE A FAILURE OR HAVE LET YOURSELF OR YOUR FAMILY DOWN: NOT AT ALL
SUM OF ALL RESPONSES TO PHQ9 QUESTIONS 1 AND 2: 5
9. THOUGHTS THAT YOU WOULD BE BETTER OFF DEAD, OR OF HURTING YOURSELF: NOT AT ALL
10. IF YOU CHECKED OFF ANY PROBLEMS, HOW DIFFICULT HAVE THESE PROBLEMS MADE IT FOR YOU TO DO YOUR WORK, TAKE CARE OF THINGS AT HOME, OR GET ALONG WITH OTHER PEOPLE: NOT DIFFICULT AT ALL
3. TROUBLE FALLING OR STAYING ASLEEP OR SLEEPING TOO MUCH: NOT AT ALL
7. TROUBLE CONCENTRATING ON THINGS, SUCH AS READING THE NEWSPAPER OR WATCHING TELEVISION: NOT AT ALL
8. MOVING OR SPEAKING SO SLOWLY THAT OTHER PEOPLE COULD HAVE NOTICED. OR THE OPPOSITE, BEING SO FIGETY OR RESTLESS THAT YOU HAVE BEEN MOVING AROUND A LOT MORE THAN USUAL: NOT AT ALL
4. FEELING TIRED OR HAVING LITTLE ENERGY: MORE THAN HALF THE DAYS
1. LITTLE INTEREST OR PLEASURE IN DOING THINGS: NEARLY EVERY DAY
5. POOR APPETITE OR OVEREATING: NOT AT ALL

## 2023-12-19 NOTE — PROGRESS NOTES
"Subjective   Patient ID: Roseanne Lira is a 73 y.o. female who presents for post operative vsiit,  Hypertension., and cough    HPI Pt had total hysterectomy  and bladder lift, on August 18 due to endometrial adenocarcinoma with subsequent radiotherapy,no complication around surgery.   However her right knee s/p replacement as well as left are on pain due to surgical position.  Wants referral to breast center, due for mammogram   Review labs postop, renal insuficiency stable.  18 days ago she had nausea, decreased apetite, sore troat , 3 days later rhinorrea,nasal discharge, productive cough, she saw provider 5 days ago, she was told lungs were clear.  Currently cough improved, still posterior drainage, persistent nasal discharge, and intermitent sinus pressure,     Review of Systems   HENT:  Negative for ear discharge and ear pain.    Gastrointestinal:  Negative for diarrhea and vomiting.   Neurological:  Negative for light-headedness.   All other systems reviewed and are negative.      Objective   /68 (BP Location: Left arm, Patient Position: Sitting, BP Cuff Size: Adult)   Pulse 81   Resp 16   Ht 1.651 m (5' 5\")   Wt 86.4 kg (190 lb 7.6 oz)   SpO2 96%   BMI 31.70 kg/m²     Physical Exam  General- non toxic, no respiratory distress  Eyes- Conjunctiva clear, PERRLA  Ears- TMs clear, no erythema, no fluid, TMs not retracted or bulging  Nose- external normal, turbinates free of swelling, erythema, and drainage, nmild sinus tenderness  Throat- mucus membranes moist, no erythema, no sinus tract drainage, no exudates or lesions  Neck- nontender, no enlarged cervical lymphadenopathy  Cardiac- regular rate and rhythm, no murmurs, no gallop or rubs  Lung- clear to auscultation, no rales, no rhonchi, no wheezing  Surgical laparoscopy scars  GI-  nontender, nondistended, no organomegaly, no guarding  Extremities- no edema  Neuro- non focal, oriented x 3  Psychiatric- no hallucinations    Assessment/Plan   Problem " List Items Addressed This Visit             ICD-10-CM       Cardiac and Vasculature    Labile hypertension R09.89     Continue current treatment            ENT    Acute non-recurrent sinusitis J01.90    Relevant Medications    amoxicillin (Amoxil) 500 mg capsule       Genitourinary and Reproductive    Mild renal insufficiency N28.9     Stable ,discussed avoid nsaids         Visit for screening mammogram - Primary Z12.31    Relevant Orders    BI mammo bilateral screening tomosynthesis       Hematology and Neoplasia    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

## 2023-12-19 NOTE — PATIENT INSTRUCTIONS
Please continue physical therapy, complete mammogram and visit to breast team , take antibiotic as written along with sarah pot, return here in 3 m for physical

## 2024-02-06 ENCOUNTER — TELEPHONE (OUTPATIENT)
Dept: SURGICAL ONCOLOGY | Facility: CLINIC | Age: 74
End: 2024-02-06
Payer: MEDICARE

## 2024-02-06 NOTE — TELEPHONE ENCOUNTER
Called Roseanne in regard to upcoming visit with - patient had DCIS dx 12 years ago, was previously being seen by [facility] & wanted to establish care with . She hasn't seen a breast cancer provider in 3 years. She would be very appropriate for breast center APRN/NP for surveillance/suvivorship. Roseanne was agreeable to this plan.

## 2024-02-07 ENCOUNTER — HOSPITAL ENCOUNTER (OUTPATIENT)
Dept: RADIOLOGY | Facility: CLINIC | Age: 74
Discharge: HOME | End: 2024-02-07
Payer: MEDICARE

## 2024-02-07 DIAGNOSIS — Z12.31 VISIT FOR SCREENING MAMMOGRAM: ICD-10-CM

## 2024-02-07 PROCEDURE — 77067 SCR MAMMO BI INCL CAD: CPT

## 2024-02-07 PROCEDURE — 77067 SCR MAMMO BI INCL CAD: CPT | Performed by: RADIOLOGY

## 2024-02-07 PROCEDURE — 77063 BREAST TOMOSYNTHESIS BI: CPT | Performed by: RADIOLOGY

## 2024-02-08 ENCOUNTER — HOSPITAL ENCOUNTER (OUTPATIENT)
Dept: RADIOLOGY | Facility: EXTERNAL LOCATION | Age: 74
Discharge: HOME | End: 2024-02-08

## 2024-02-13 ENCOUNTER — TELEPHONE (OUTPATIENT)
Dept: RADIATION ONCOLOGY | Facility: HOSPITAL | Age: 74
End: 2024-02-13
Payer: MEDICARE

## 2024-02-13 ENCOUNTER — APPOINTMENT (OUTPATIENT)
Dept: SURGICAL ONCOLOGY | Facility: CLINIC | Age: 74
End: 2024-02-13
Payer: MEDICARE

## 2024-02-14 ENCOUNTER — HOSPITAL ENCOUNTER (OUTPATIENT)
Dept: RADIATION ONCOLOGY | Facility: HOSPITAL | Age: 74
Setting detail: RADIATION/ONCOLOGY SERIES
Discharge: HOME | End: 2024-02-14
Payer: MEDICARE

## 2024-02-14 VITALS
TEMPERATURE: 98.1 F | BODY MASS INDEX: 31.52 KG/M2 | HEART RATE: 74 BPM | HEIGHT: 65 IN | DIASTOLIC BLOOD PRESSURE: 73 MMHG | WEIGHT: 189.15 LBS | SYSTOLIC BLOOD PRESSURE: 134 MMHG | RESPIRATION RATE: 18 BRPM | OXYGEN SATURATION: 96 %

## 2024-02-14 DIAGNOSIS — C54.1 ENDOMETRIAL ADENOCARCINOMA (MULTI): Primary | ICD-10-CM

## 2024-02-14 PROCEDURE — 99214 OFFICE O/P EST MOD 30 MIN: CPT | Performed by: NURSE PRACTITIONER

## 2024-02-14 ASSESSMENT — ENCOUNTER SYMPTOMS
FREQUENCY: 0
HEMATURIA: 0
BLOOD IN STOOL: 0
RECTAL PAIN: 0
PSYCHIATRIC NEGATIVE: 1
UNEXPECTED WEIGHT CHANGE: 0
NEUROLOGICAL NEGATIVE: 1
DIARRHEA: 0
CONSTIPATION: 0
LOSS OF SENSATION IN FEET: 0
ABDOMINAL PAIN: 0
ANAL BLEEDING: 0
ABDOMINAL DISTENTION: 0
DIAPHORESIS: 0
CHILLS: 0
HEMATOLOGIC/LYMPHATIC NEGATIVE: 1
OCCASIONAL FEELINGS OF UNSTEADINESS: 0
RESPIRATORY NEGATIVE: 1
MUSCULOSKELETAL NEGATIVE: 1
FATIGUE: 0
ACTIVITY CHANGE: 0
VOMITING: 0
FEVER: 0
DYSURIA: 0
CARDIOVASCULAR NEGATIVE: 1
APPETITE CHANGE: 0
DIFFICULTY URINATING: 0
NAUSEA: 0
DEPRESSION: 1

## 2024-02-14 ASSESSMENT — PAIN SCALES - GENERAL: PAINLEVEL: 0-NO PAIN

## 2024-02-14 ASSESSMENT — PATIENT HEALTH QUESTIONNAIRE - PHQ9
SUM OF ALL RESPONSES TO PHQ9 QUESTIONS 1 AND 2: 0
1. LITTLE INTEREST OR PLEASURE IN DOING THINGS: NOT AT ALL
2. FEELING DOWN, DEPRESSED OR HOPELESS: NOT AT ALL

## 2024-02-14 NOTE — PROGRESS NOTES
Patient ID: [id number]     Diagnosis: 8/18/23: 73 year old female with FIGO  Stage IB (pT1b, pN0, cM0) G1 endometrial adenocarcinoma of the uterus, p53 wt, MMRd, extensive LVSI with negative sentinel node involvement      Cancer History:   7/25/23: D&C showed endometrial adenocarcinoma, endometriod type  8/18/23: NISH and BSO, sLND with uterosacral ligament suspension; showed FIGO G1 with 75% myometrial invasion 0/2LNs +  9/7/23: Appt with Dr. Magallon; adding to gyn TB     Dr. Magallon had referred the patient for evaluation and treatment for radiation.     HDR to the vaginal cuff completed on 10/11/23.    History of presenting illness    Roseanne Lira is a 73 y.o. female who presents today for follow up 4 months s/p HDR to the vaginal cuff. Patient is doing well. Energy is baseline. Appetite is good. She gave up sugar and has lost around 5 pounds. Endorses some abdominal pain where her pants sit over a lap site from her hysterectomy but it has improved some. Denies pelvic pain, vaginal bleeding or discharge. She had surgery and a sling placed for prolapse. She said leakage as rare. She doesn't realize how full her bladder is and doesn't always feel when she has to go. She was told that would take time to resolve. Bowels baseline. Denies rectal bleeding. Patient has a dilator she ordered from [employer] at home she continues to use. Patient is not sexually active. Follow up with Dr. Pack in May.      Review of systems:  Review of Systems   Constitutional:  Negative for activity change, appetite change, chills, diaphoresis, fatigue, fever and unexpected weight change.   HENT: Negative.     Respiratory: Negative.     Cardiovascular: Negative.    Gastrointestinal:  Negative for abdominal distention, abdominal pain, anal bleeding, blood in stool, constipation, diarrhea, nausea, rectal pain and vomiting.   Genitourinary:  Negative for decreased urine volume, difficulty urinating, dysuria, enuresis, frequency, hematuria, pelvic  pain, urgency, vaginal bleeding, vaginal discharge and vaginal pain.   Musculoskeletal: Negative.    Skin: Negative.    Neurological: Negative.    Hematological: Negative.    Psychiatric/Behavioral: Negative.         Past Medical history  She  has a past surgical history that includes Spine surgery (06/03/2013); Other surgical history (06/03/2013); Breast surgery (06/03/2013); Colonoscopy (06/03/2013); Other surgical history (10/19/2020); Tubal ligation (05/19/2015); Other surgical history (03/13/2017); Other surgical history (03/13/2017); MR angio head wo IV contrast (07/27/2020); MR angio neck wo IV contrast (07/27/2020); MR angio head wo IV contrast (05/18/2017); MR angio neck wo IV contrast (05/18/2017); CT angio neck (12/09/2022); CT angio head w and wo IV contrast (12/09/2022); and Breast lumpectomy (Right).        Last recorded vital:  There were no vitals taken for this visit.    Physical exam  Physical Exam  Constitutional:       General: She is not in acute distress.     Appearance: Normal appearance. She is not ill-appearing, toxic-appearing or diaphoretic.   HENT:      Head: Normocephalic.   Cardiovascular:      Rate and Rhythm: Normal rate and regular rhythm.      Pulses: Normal pulses.      Heart sounds: Normal heart sounds.   Pulmonary:      Effort: Pulmonary effort is normal.      Breath sounds: Normal breath sounds.   Abdominal:      General: Bowel sounds are normal. There is no distension.      Palpations: Abdomen is soft. There is no mass.      Tenderness: There is no abdominal tenderness. There is no guarding or rebound.      Hernia: No hernia is present.   Genitourinary:     General: Normal vulva.      Pubic Area: No rash.       Labia:         Right: No rash, tenderness, lesion or injury.         Left: No rash, tenderness, lesion or injury.       Urethra: No prolapse, urethral pain, urethral swelling or urethral lesion.      Vagina: No vaginal discharge.      Comments: Normal external female  genitalia.  Urethral meatus normal. Vagina normal.  Uterus and cervix surgically absent.  No evidence of masses in pelvis. No pain with palpation.  No vaginal bleeding or abnormal discharge.  Musculoskeletal:      Cervical back: Normal range of motion and neck supple.   Skin:     General: Skin is warm and dry.   Neurological:      General: No focal deficit present.      Mental Status: She is alert and oriented to person, place, and time.   Psychiatric:         Mood and Affect: Mood normal.         Behavior: Behavior normal.         Thought Content: Thought content normal.         Judgment: Judgment normal.         Plan:  Assessment/Plan     73 year old female 4 month s/p HDR to the vaginal cuff. Patient is doing well with no acute complaints related to treatment. BINH on exam. Patient can discontine vaginal dilators when ready. Continue follow up with Gyn as scheduled. Patient to return to clinic in 6 months unless needs to be seen sooner. Instructed to call with questions or concerns.

## 2024-02-16 ENCOUNTER — TELEPHONE (OUTPATIENT)
Dept: PRIMARY CARE | Facility: CLINIC | Age: 74
End: 2024-02-16
Payer: MEDICARE

## 2024-02-24 DIAGNOSIS — I10 ESSENTIAL (PRIMARY) HYPERTENSION: ICD-10-CM

## 2024-02-26 RX ORDER — HYDROCHLOROTHIAZIDE 12.5 MG/1
12.5 CAPSULE ORAL EVERY MORNING
Qty: 90 CAPSULE | Refills: 1 | Status: SHIPPED | OUTPATIENT
Start: 2024-02-26

## 2024-03-15 DIAGNOSIS — K21.9 GASTRO-ESOPHAGEAL REFLUX DISEASE WITHOUT ESOPHAGITIS: ICD-10-CM

## 2024-03-18 RX ORDER — FAMOTIDINE 40 MG/1
40 TABLET, FILM COATED ORAL NIGHTLY
Qty: 90 TABLET | Refills: 1 | Status: SHIPPED | OUTPATIENT
Start: 2024-03-18

## 2024-03-27 ENCOUNTER — LAB (OUTPATIENT)
Dept: LAB | Facility: LAB | Age: 74
End: 2024-03-27
Payer: MEDICARE

## 2024-03-27 ENCOUNTER — OFFICE VISIT (OUTPATIENT)
Dept: PRIMARY CARE | Facility: CLINIC | Age: 74
End: 2024-03-27
Payer: MEDICARE

## 2024-03-27 VITALS
RESPIRATION RATE: 16 BRPM | BODY MASS INDEX: 32.23 KG/M2 | OXYGEN SATURATION: 96 % | TEMPERATURE: 97.3 F | HEART RATE: 78 BPM | WEIGHT: 188.8 LBS | SYSTOLIC BLOOD PRESSURE: 130 MMHG | DIASTOLIC BLOOD PRESSURE: 78 MMHG | HEIGHT: 64 IN

## 2024-03-27 DIAGNOSIS — M25.551 BILATERAL HIP PAIN: ICD-10-CM

## 2024-03-27 DIAGNOSIS — M25.552 BILATERAL HIP PAIN: ICD-10-CM

## 2024-03-27 DIAGNOSIS — E55.9 VITAMIN D DEFICIENCY, UNSPECIFIED: ICD-10-CM

## 2024-03-27 DIAGNOSIS — M79.7 FIBROMYALGIA: ICD-10-CM

## 2024-03-27 DIAGNOSIS — I10 BENIGN ESSENTIAL HYPERTENSION: ICD-10-CM

## 2024-03-27 DIAGNOSIS — M35.3 PMR (POLYMYALGIA RHEUMATICA) (MULTI): ICD-10-CM

## 2024-03-27 DIAGNOSIS — G89.29 CHRONIC BILATERAL LOW BACK PAIN WITHOUT SCIATICA: Primary | ICD-10-CM

## 2024-03-27 DIAGNOSIS — R73.9 HYPERGLYCEMIA: ICD-10-CM

## 2024-03-27 DIAGNOSIS — K21.00 GASTROESOPHAGEAL REFLUX DISEASE WITH ESOPHAGITIS WITHOUT HEMORRHAGE: ICD-10-CM

## 2024-03-27 DIAGNOSIS — I72.6: ICD-10-CM

## 2024-03-27 DIAGNOSIS — E78.5 DYSLIPIDEMIA: ICD-10-CM

## 2024-03-27 DIAGNOSIS — M81.0 AGE RELATED OSTEOPOROSIS, UNSPECIFIED PATHOLOGICAL FRACTURE PRESENCE: ICD-10-CM

## 2024-03-27 DIAGNOSIS — F33.9 DEPRESSION, RECURRENT (CMS-HCC): ICD-10-CM

## 2024-03-27 DIAGNOSIS — N18.31 CHRONIC KIDNEY DISEASE, STAGE 3A (MULTI): ICD-10-CM

## 2024-03-27 DIAGNOSIS — M54.50 CHRONIC BILATERAL LOW BACK PAIN WITHOUT SCIATICA: Primary | ICD-10-CM

## 2024-03-27 DIAGNOSIS — Z00.00 WELLNESS EXAMINATION: ICD-10-CM

## 2024-03-27 DIAGNOSIS — N28.9 MILD RENAL INSUFFICIENCY: ICD-10-CM

## 2024-03-27 PROBLEM — I95.1 ORTHOSTATIC HYPOTENSION: Status: RESOLVED | Noted: 2023-10-07 | Resolved: 2024-03-27

## 2024-03-27 PROBLEM — M17.0 ARTHRITIS OF BOTH KNEES: Status: RESOLVED | Noted: 2023-10-07 | Resolved: 2024-03-27

## 2024-03-27 PROBLEM — J01.90 ACUTE NON-RECURRENT SINUSITIS: Status: RESOLVED | Noted: 2023-12-19 | Resolved: 2024-03-27

## 2024-03-27 LAB
25(OH)D3 SERPL-MCNC: 56 NG/ML (ref 30–100)
ALBUMIN SERPL BCP-MCNC: 4.6 G/DL (ref 3.4–5)
ALP SERPL-CCNC: 63 U/L (ref 33–136)
ALT SERPL W P-5'-P-CCNC: 12 U/L (ref 7–45)
ANION GAP SERPL CALC-SCNC: 17 MMOL/L (ref 10–20)
AST SERPL W P-5'-P-CCNC: 14 U/L (ref 9–39)
BILIRUB SERPL-MCNC: 0.6 MG/DL (ref 0–1.2)
BUN SERPL-MCNC: 24 MG/DL (ref 6–23)
CALCIUM SERPL-MCNC: 9.9 MG/DL (ref 8.6–10.6)
CHLORIDE SERPL-SCNC: 99 MMOL/L (ref 98–107)
CHOLEST SERPL-MCNC: 284 MG/DL (ref 0–199)
CHOLESTEROL/HDL RATIO: 4.1
CO2 SERPL-SCNC: 29 MMOL/L (ref 21–32)
CREAT SERPL-MCNC: 1 MG/DL (ref 0.5–1.05)
EGFRCR SERPLBLD CKD-EPI 2021: 59 ML/MIN/1.73M*2
ERYTHROCYTE [DISTWIDTH] IN BLOOD BY AUTOMATED COUNT: 14.2 % (ref 11.5–14.5)
EST. AVERAGE GLUCOSE BLD GHB EST-MCNC: 108 MG/DL
GLUCOSE SERPL-MCNC: 90 MG/DL (ref 74–99)
HBA1C MFR BLD: 5.4 %
HCT VFR BLD AUTO: 44.6 % (ref 36–46)
HDLC SERPL-MCNC: 69.7 MG/DL
HGB BLD-MCNC: 14.2 G/DL (ref 12–16)
LDLC SERPL CALC-MCNC: 179 MG/DL
MCH RBC QN AUTO: 27.3 PG (ref 26–34)
MCHC RBC AUTO-ENTMCNC: 31.8 G/DL (ref 32–36)
MCV RBC AUTO: 86 FL (ref 80–100)
NON HDL CHOLESTEROL: 214 MG/DL (ref 0–149)
NRBC BLD-RTO: 0 /100 WBCS (ref 0–0)
PLATELET # BLD AUTO: 437 X10*3/UL (ref 150–450)
POTASSIUM SERPL-SCNC: 4.2 MMOL/L (ref 3.5–5.3)
PROT SERPL-MCNC: 7.6 G/DL (ref 6.4–8.2)
RBC # BLD AUTO: 5.2 X10*6/UL (ref 4–5.2)
SODIUM SERPL-SCNC: 141 MMOL/L (ref 136–145)
TRIGL SERPL-MCNC: 176 MG/DL (ref 0–149)
TSH SERPL-ACNC: 1.13 MIU/L (ref 0.44–3.98)
VLDL: 35 MG/DL (ref 0–40)
WBC # BLD AUTO: 7.5 X10*3/UL (ref 4.4–11.3)

## 2024-03-27 PROCEDURE — 1159F MED LIST DOCD IN RCRD: CPT | Performed by: INTERNAL MEDICINE

## 2024-03-27 PROCEDURE — 1170F FXNL STATUS ASSESSED: CPT | Performed by: INTERNAL MEDICINE

## 2024-03-27 PROCEDURE — 85027 COMPLETE CBC AUTOMATED: CPT

## 2024-03-27 PROCEDURE — 1126F AMNT PAIN NOTED NONE PRSNT: CPT | Performed by: INTERNAL MEDICINE

## 2024-03-27 PROCEDURE — 3008F BODY MASS INDEX DOCD: CPT | Performed by: INTERNAL MEDICINE

## 2024-03-27 PROCEDURE — 83036 HEMOGLOBIN GLYCOSYLATED A1C: CPT

## 2024-03-27 PROCEDURE — 84443 ASSAY THYROID STIM HORMONE: CPT

## 2024-03-27 PROCEDURE — 80053 COMPREHEN METABOLIC PANEL: CPT

## 2024-03-27 PROCEDURE — 99397 PER PM REEVAL EST PAT 65+ YR: CPT | Performed by: INTERNAL MEDICINE

## 2024-03-27 PROCEDURE — 3075F SYST BP GE 130 - 139MM HG: CPT | Performed by: INTERNAL MEDICINE

## 2024-03-27 PROCEDURE — 80061 LIPID PANEL: CPT

## 2024-03-27 PROCEDURE — G0439 PPPS, SUBSEQ VISIT: HCPCS | Performed by: INTERNAL MEDICINE

## 2024-03-27 PROCEDURE — 82306 VITAMIN D 25 HYDROXY: CPT

## 2024-03-27 PROCEDURE — 1036F TOBACCO NON-USER: CPT | Performed by: INTERNAL MEDICINE

## 2024-03-27 PROCEDURE — 3078F DIAST BP <80 MM HG: CPT | Performed by: INTERNAL MEDICINE

## 2024-03-27 PROCEDURE — 36415 COLL VENOUS BLD VENIPUNCTURE: CPT

## 2024-03-27 PROCEDURE — 1160F RVW MEDS BY RX/DR IN RCRD: CPT | Performed by: INTERNAL MEDICINE

## 2024-03-27 ASSESSMENT — ENCOUNTER SYMPTOMS
OCCASIONAL FEELINGS OF UNSTEADINESS: 0
SHORTNESS OF BREATH: 0
PALPITATIONS: 0
DEPRESSION: 0
LOSS OF SENSATION IN FEET: 0

## 2024-03-27 ASSESSMENT — ACTIVITIES OF DAILY LIVING (ADL)
BATHING: INDEPENDENT
MANAGING_FINANCES: INDEPENDENT
DOING_HOUSEWORK: INDEPENDENT
GROCERY_SHOPPING: INDEPENDENT
DRESSING: INDEPENDENT
TAKING_MEDICATION: INDEPENDENT

## 2024-03-27 ASSESSMENT — PATIENT HEALTH QUESTIONNAIRE - PHQ9
1. LITTLE INTEREST OR PLEASURE IN DOING THINGS: NOT AT ALL
2. FEELING DOWN, DEPRESSED OR HOPELESS: NOT AT ALL
SUM OF ALL RESPONSES TO PHQ9 QUESTIONS 1 AND 2: 0

## 2024-03-27 ASSESSMENT — PAIN SCALES - GENERAL: PAINLEVEL: 0-NO PAIN

## 2024-03-27 NOTE — ASSESSMENT & PLAN NOTE
>>ASSESSMENT AND PLAN FOR BENIGN ESSENTIAL HYPERTENSION WRITTEN ON 3/27/2024 10:31 AM BY SOFIA GONZALES MD    Well control

## 2024-03-27 NOTE — PATIENT INSTRUCTIONS
Reduce from 2,000 to 1,500 mg acetaminophen /day and use as needed 200 to 400 mg advil/day. Please complete shingrex vaccine at local pharmacy

## 2024-03-27 NOTE — PROGRESS NOTES
"Subjective   Reason for Visit: Roseanne Lira is an 73 y.o. female here for a Medicare Wellness visit.          HPI    Patient Care Team:  Page Lopez MD as PCP - General (Internal Medicine)  Page Lopez MD as PCP - [employer] PCP  Page Lopez MD as Primary Care Provider   She eats 2 to 3 meals/day, she reduced sugar in janaury early February, back consumes any kind of proteins and eggs, 4 to 5 servings of fresh produces. Sweets every day.  She had right knee replacement April 2023, still sore, taking 1,000 bid dailyl . Middle of the day 400 mg around noon, but  able to walk about 3,000 steps /day every day. During fall and winter she used machines. In May she will re start swimming.  She completed complete hysterectomy with sentinel lymph on August 18 for endometrial cancer stage 1 b.  With dr Demetria Trent. As well as bladder lift  Subsequent she had high intensity radiation. Now following every 3 months.  Mood is improved after increased velnlafaxine in winter  Review of Systems   Respiratory:  Negative for shortness of breath.    Cardiovascular:  Negative for chest pain and palpitations.   Musculoskeletal:         Lower back pain and pelvic pain after she walks short distance that resolves with rest and able to continue   Neurological:         Persistent dizzziness unchanged, will see neuro , likely new MRI in spring   All other systems reviewed and are negative.      Objective   Vitals:  /78 (BP Location: Right arm, Patient Position: Sitting, BP Cuff Size: Adult)   Pulse 78   Temp 36.3 °C (97.3 °F)   Resp 16   Ht 1.626 m (5' 4\")   Wt 85.6 kg (188 lb 12.8 oz)   SpO2 96%   BMI 32.41 kg/m²       Physical Exam    Assessment/Plan   Problem List Items Addressed This Visit       Dyslipidemia    Current Assessment & Plan     Pending lipid         Relevant Orders    Comprehensive Metabolic Panel    Lipid Panel    GERD (gastroesophageal reflux disease)    Current " Assessment & Plan     stable         Mild renal insufficiency    Current Assessment & Plan     Stable in the past , re eval with labs         Fibromyalgia    Current Assessment & Plan     Pain control discussed         Age related osteoporosis    Relevant Orders    XR DEXA bone density    Benign essential hypertension    Current Assessment & Plan     Well control         Relevant Orders    TSH with reflex to Free T4 if abnormal    Chronic bilateral low back pain without sciatica - Primary    Current Assessment & Plan     Chronic, stable, xrays pending, PT in the future will be discuss         Relevant Orders    XR lumbar spine 2-3 views    Wellness examination    Current Assessment & Plan     Discussed diet, exercise, immunizations, and screening appropriate for their age.  Colonoscopy: may 2022 next in 5 to 7  Dexa: dec 2021 due  Mammogram:feb 2024             Bilateral hip pain    Relevant Orders    XR hip left with pelvis when performed 2 or 3 views    XR hip right with pelvis when performed 2 or 3 views    CBC     Other Visit Diagnoses       Vitamin D deficiency, unspecified        Relevant Orders    Vitamin D 25-Hydroxy,Total (for eval of Vitamin D levels)    Hyperglycemia        Relevant Orders    Hemoglobin A1C

## 2024-03-27 NOTE — ASSESSMENT & PLAN NOTE
Discussed diet, exercise, immunizations, and screening appropriate for their age.  Colonoscopy: may 2022 next in 5 to 7  Dexa: dec 2021 due  Mammogram:feb 2024

## 2024-04-02 ENCOUNTER — HOSPITAL ENCOUNTER (OUTPATIENT)
Dept: RADIOLOGY | Facility: CLINIC | Age: 74
Discharge: HOME | End: 2024-04-02
Payer: MEDICARE

## 2024-04-02 ENCOUNTER — HOSPITAL ENCOUNTER (OUTPATIENT)
Dept: RADIOLOGY | Facility: CLINIC | Age: 74
End: 2024-04-02
Payer: MEDICARE

## 2024-04-02 DIAGNOSIS — M25.551 BILATERAL HIP PAIN: ICD-10-CM

## 2024-04-02 DIAGNOSIS — M25.552 BILATERAL HIP PAIN: ICD-10-CM

## 2024-04-02 DIAGNOSIS — G89.29 CHRONIC BILATERAL LOW BACK PAIN WITHOUT SCIATICA: ICD-10-CM

## 2024-04-02 DIAGNOSIS — M54.50 CHRONIC BILATERAL LOW BACK PAIN WITHOUT SCIATICA: ICD-10-CM

## 2024-04-02 PROCEDURE — 72100 X-RAY EXAM L-S SPINE 2/3 VWS: CPT

## 2024-04-02 PROCEDURE — 72100 X-RAY EXAM L-S SPINE 2/3 VWS: CPT | Performed by: RADIOLOGY

## 2024-04-02 PROCEDURE — 73521 X-RAY EXAM HIPS BI 2 VIEWS: CPT

## 2024-04-02 PROCEDURE — 73523 X-RAY EXAM HIPS BI 5/> VIEWS: CPT | Mod: BILATERAL PROCEDURE | Performed by: RADIOLOGY

## 2024-04-03 DIAGNOSIS — E78.5 DYSLIPIDEMIA: Primary | ICD-10-CM

## 2024-04-03 RX ORDER — LOVASTATIN 20 MG/1
20 TABLET ORAL DAILY
Qty: 30 TABLET | Refills: 3 | Status: SHIPPED | OUTPATIENT
Start: 2024-04-03 | End: 2024-05-02 | Stop reason: ALTCHOICE

## 2024-04-08 ENCOUNTER — TELEPHONE (OUTPATIENT)
Dept: PRIMARY CARE | Facility: CLINIC | Age: 74
End: 2024-04-08
Payer: MEDICARE

## 2024-04-16 DIAGNOSIS — I10 BENIGN ESSENTIAL HYPERTENSION: Primary | ICD-10-CM

## 2024-04-16 RX ORDER — AMLODIPINE BESYLATE 2.5 MG/1
2.5 TABLET ORAL 2 TIMES DAILY
Qty: 180 TABLET | Refills: 1 | Status: SHIPPED | OUTPATIENT
Start: 2024-04-16

## 2024-04-17 ENCOUNTER — OFFICE VISIT (OUTPATIENT)
Dept: ORTHOPEDIC SURGERY | Facility: CLINIC | Age: 74
End: 2024-04-17
Payer: MEDICARE

## 2024-04-17 ENCOUNTER — HOSPITAL ENCOUNTER (OUTPATIENT)
Dept: RADIOLOGY | Facility: CLINIC | Age: 74
Discharge: HOME | End: 2024-04-17
Payer: MEDICARE

## 2024-04-17 VITALS — BODY MASS INDEX: 32.1 KG/M2 | WEIGHT: 188 LBS | HEIGHT: 64 IN

## 2024-04-17 DIAGNOSIS — M25.561 RIGHT KNEE PAIN, UNSPECIFIED CHRONICITY: ICD-10-CM

## 2024-04-17 DIAGNOSIS — Z47.1 AFTERCARE FOLLOWING RIGHT KNEE JOINT REPLACEMENT SURGERY: Primary | ICD-10-CM

## 2024-04-17 DIAGNOSIS — Z96.651 AFTERCARE FOLLOWING RIGHT KNEE JOINT REPLACEMENT SURGERY: Primary | ICD-10-CM

## 2024-04-17 PROCEDURE — 73562 X-RAY EXAM OF KNEE 3: CPT | Mod: RIGHT SIDE | Performed by: RADIOLOGY

## 2024-04-17 PROCEDURE — 1125F AMNT PAIN NOTED PAIN PRSNT: CPT | Performed by: ORTHOPAEDIC SURGERY

## 2024-04-17 PROCEDURE — 99213 OFFICE O/P EST LOW 20 MIN: CPT | Performed by: ORTHOPAEDIC SURGERY

## 2024-04-17 PROCEDURE — 1036F TOBACCO NON-USER: CPT | Performed by: ORTHOPAEDIC SURGERY

## 2024-04-17 PROCEDURE — 73562 X-RAY EXAM OF KNEE 3: CPT | Mod: RT

## 2024-04-17 PROCEDURE — 1159F MED LIST DOCD IN RCRD: CPT | Performed by: ORTHOPAEDIC SURGERY

## 2024-04-17 PROCEDURE — 1160F RVW MEDS BY RX/DR IN RCRD: CPT | Performed by: ORTHOPAEDIC SURGERY

## 2024-04-17 ASSESSMENT — PAIN DESCRIPTION - DESCRIPTORS: DESCRIPTORS: ACHING

## 2024-04-17 ASSESSMENT — PAIN SCALES - GENERAL: PAINLEVEL_OUTOF10: 4

## 2024-04-17 ASSESSMENT — PAIN - FUNCTIONAL ASSESSMENT: PAIN_FUNCTIONAL_ASSESSMENT: 0-10

## 2024-04-17 NOTE — PROGRESS NOTES
Patient is a 73-year-old female who presents today for 1 year follow-up status post right total knee replacement.  Overall she is done well.  She had no problems after surgery regards to wound healing or infection.  She has an occasional soft tissue pain around the knee.  She is also complaining of low back and buttock pain.  She did recently have lumbar films which demonstrated degenerative change.    Right knee:  AAOx3, NAD, walks with a non-antalgic gait  Neutral allignment  Range of motion 0-110  Stable to varus/valgus/anterior/posterior stress through out the range of motion  No no joint line tenderness to palpation  No effusion  SILT in a glenna/saph/per/tib distribution  5/5 knee extension/df/pf/ehl  ½ dorsalis pedis and posterior tibial pulse  no popliteal lymphadenopathy  no other overlying lesions  mood: euthymic  Respirations non labored    X-rays reviewed by myself today in clinic reveal excellent alignment of the total knee arthroplasty components with no signs of early loosening or failure.    We refer her to spine to evaluate her lumbar disease.  Her knee looks great.  All questions answered.  Follow-up in 5 years with repeat x-rays for her right knee.    This note was created using voice recognition software and was not corrected for typographical or grammatical errors.

## 2024-05-02 ENCOUNTER — OFFICE VISIT (OUTPATIENT)
Dept: OBSTETRICS AND GYNECOLOGY | Facility: CLINIC | Age: 74
End: 2024-05-02
Payer: MEDICARE

## 2024-05-02 VITALS
DIASTOLIC BLOOD PRESSURE: 82 MMHG | HEIGHT: 65 IN | WEIGHT: 185 LBS | BODY MASS INDEX: 30.82 KG/M2 | SYSTOLIC BLOOD PRESSURE: 130 MMHG

## 2024-05-02 DIAGNOSIS — L90.0 LICHEN SCLEROSUS: Primary | ICD-10-CM

## 2024-05-02 PROCEDURE — 99213 OFFICE O/P EST LOW 20 MIN: CPT | Performed by: OBSTETRICS & GYNECOLOGY

## 2024-05-02 PROCEDURE — 1159F MED LIST DOCD IN RCRD: CPT | Performed by: OBSTETRICS & GYNECOLOGY

## 2024-05-02 PROCEDURE — 1160F RVW MEDS BY RX/DR IN RCRD: CPT | Performed by: OBSTETRICS & GYNECOLOGY

## 2024-05-02 PROCEDURE — 3079F DIAST BP 80-89 MM HG: CPT | Performed by: OBSTETRICS & GYNECOLOGY

## 2024-05-02 PROCEDURE — 1126F AMNT PAIN NOTED NONE PRSNT: CPT | Performed by: OBSTETRICS & GYNECOLOGY

## 2024-05-02 PROCEDURE — 3075F SYST BP GE 130 - 139MM HG: CPT | Performed by: OBSTETRICS & GYNECOLOGY

## 2024-05-02 RX ORDER — CLOTRIMAZOLE AND BETAMETHASONE DIPROPIONATE 10; .64 MG/G; MG/G
CREAM TOPICAL
Qty: 1 G | Refills: 3 | Status: SHIPPED | OUTPATIENT
Start: 2024-05-02

## 2024-05-02 ASSESSMENT — PAIN SCALES - GENERAL: PAINLEVEL: 0-NO PAIN

## 2024-05-02 NOTE — PROGRESS NOTES
Urogynecology  Provider:  Anastacia Pack MD  [phone]    ASSESSMENT AND PLAN:   73 year old female with LS and hx of uterovaginal prolapse and MIKALA. Comorbidities include: Stage 1 endometrial cancer managed by Dr. Demetria Trent in Gyn/Onc and of note she recently completed RT tx. History of breast cancer and did not take Tamoxifen.      Diagnoses:   #1 Uterovaginal prolapse (resolved)  #2 Stress urinary incontinence (resolved)  #3 Lichen sclerosus      Plan:   1. Uterovaginal prolapse, MIKALA   - 8/18/2023: TLH, BS, and bilateral SLND by Dr. Demetria Trent with combined case LSC, USLS, MUS, and cystoscopy by Dr. Pack.   - No evidence of POP or MIKALA by clinical exam and subjectively per the patient.     2. LS  - Does endorse occasional vulvar itching related to her LS diagnosis but uses Lotrisone cream as needed to help manage this without any recent flares.   - Upon vulvar exam she has skin changes consistent with lichen sclerosus but no concerning lesions or masses.   - Sent Rx of Clotrimazole-Betamethasone cream to her pharmacy and reassured her she can use this low-potency steroid/antifungal cream up to BID to help reduce symptoms of LS.     3. Endometrial cancer, stage 1  - Followed by Dr. Demetria Trent in Gyn/Onc.   - Recently completed RT and is now pursuing surveillance visits every 3 months but is presently BINH.      Follow up in 6 months with Dr. Pack for a vulvar check.     Scribe Attestation  By signing my name below, I, Bryce Freeman, attest that this documentation has been prepared under the direction and in the presence of Anastacia Pack MD on 05/02/2024 at 12:02 AM.     Problem List Items Addressed This Visit    None          I spent a total of eConsult Time: 25 minutes in face to face and non face to face time.        Anastacia Pack MD        HISTORY OF PRESENT ILLNESS:   73 year old female following up for a pelvic exam.     Record Review:   - Last visit 11/2023  ASSESSMENT AND  PLAN:   73 year old female with uterovaginal prolapse and MIKALA. Comorbidities include: Stage 1 endometrial cancer managed by Dr. Demetria Trent in Gyn/Onc and of note she recently completed RT tx. History of breast cancer and did not take Tamoxifen.      Diagnoses:   #1 Uterovaginal prolapse  #2 Stress urinary incontinence   #3 Yeast vaginitis     Plan:   1. Uterovaginal prolapse, MIKALA   - 8/18/2023: TLH, BS, and bilateral SLND by Dr. Demetria Trent with combined case LSC, USLS, MUS, and cystoscopy by Dr. Pack.   - PVR = 0mL by bladder U/S.  - No evidence of POP or MIKALA by clinical exam and subjectively per the patient. Upon exam her midurethral sling in place with no mesh erosion visualized or palpated with well-healed sling exit sites. Well-healed uterosacral ligament suspension, well suspended apex without prolapse. Cuff is intact and well-healed. V-loc sutures have dissolved. No masses and no foreign body confirmed on bimanual exam.   - She is well healed and is not having any issues.      2. Yeast vaginitis  - Sent Rx of Nystatin cream to apply topically to her vulva up to BID or PRN itching.      3. Endometrial cancer, stage 1  - Followed by Dr. Demetria Trent in Gyn/Onc.   - Recently completed RT and is now pursuing surveillance visits q 3 months but is presently BINH.       Pelvic and Vulvar Symptoms:   - She recently returned from a trip from [place] and reports walking twice as much as she normally does so her knees are hurting.   - Denies feeling any return of POP or a vaginal bulge.   - No MIKALA with coughing, laughing, or sneezing.   - She continues to follow up with Dr. Trent for hx of stage 1 endometrial CA and is currently BINH.   - Does endorse occasional vulvar itching related to her LS diagnosis but uses Lotrisone cream as needed to help manage this without any recent flares.       Past Medical History:   Past Medical History:   Diagnosis Date    Age-related nuclear cataract, left eye 03/25/2017     Age-related nuclear cataract of left eye    Age-related nuclear cataract, right eye 03/25/2017    Age-related nuclear cataract of right eye    Allergy status to unspecified drugs, medicaments and biological substances     History of seasonal allergies    Cortical age-related cataract, left eye 03/25/2017    Cortical age-related cataract of left eye    Cortical age-related cataract, right eye 03/25/2017    Cortical age-related cataract of right eye    Diarrhea, unspecified 09/25/2018    Acute diarrhea    Dizziness and giddiness 06/10/2020    Postural dizziness with presyncope    Elevated blood-pressure reading, without diagnosis of hypertension 10/16/2019    Prehypertension    Encounter for general adult medical examination without abnormal findings 09/14/2020    Preventative health care    Encounter for other preprocedural examination     Preoperative exam for gynecologic surgery    Encounter for screening mammogram for malignant neoplasm of breast 06/03/2013    Visit for screening mammogram    Epigastric pain 01/24/2020    Abdominal pain, acute, epigastric    Headache, unspecified 10/19/2020    Sinus headache    Intraductal carcinoma in situ of unspecified breast 04/01/2015    DCIS (ductal carcinoma in situ) of breast    Leukoplakia of vulva     Lichen sclerosus of female genitalia    Long term (current) use of systemic steroids 08/27/2019    Current chronic use of systemic steroids    Other chest pain 10/16/2019    Atypical chest pain    Other conditions influencing health status 10/25/2019    Chronic use of steroids    Other disturbances of skin sensation 05/19/2015    Burning sensation    Other malaise 08/23/2018    Malaise and fatigue    Other pericardial effusion (noninflammatory) ([facility]) 04/07/2015    Pericardial effusion    Personal history of malignant neoplasm of bone 07/11/2019    History of malignant neoplasm of bone    Personal history of other diseases of the respiratory system 04/01/2015    History  of asthma    Personal history of other diseases of the respiratory system 04/05/2017    History of acute bronchitis    Personal history of other diseases of the respiratory system 01/03/2014    History of upper respiratory infection    Personal history of other specified conditions 03/30/2015    History of abdominal pain    Personal history of urinary (tract) infections 06/23/2015    History of urinary tract infection    Personal history of urinary (tract) infections 04/05/2017    History of acute cystitis    Unspecified visual disturbance 07/07/2020    Visual disturbances    Urinary tract infection, site not specified 05/13/2021    Acute UTI        Past Surgical History:     Past Surgical History:   Procedure Laterality Date    BREAST LUMPECTOMY Right     with radiation in 2011    BREAST SURGERY  06/03/2013    Breast Surgery    COLONOSCOPY  06/03/2013    Complete Colonoscopy    CT ANGIO NECK  12/09/2022    CT NECK ANGIO W AND WO IV CONTRAST 12/9/2022 [facility]    CT HEAD ANGIO W AND WO IV CONTRAST  12/09/2022    CT HEAD ANGIO W AND WO IV CONTRAST 12/9/2022 [facility]    MR HEAD ANGIO WO IV CONTRAST  07/27/2020    MR HEAD ANGIO WO IV CONTRAST 7/27/2020 [facility]    MR HEAD ANGIO WO IV CONTRAST  05/18/2017    MR HEAD ANGIO WO IV CONTRAST 5/18/2017 [facility]    MR NECK ANGIO WO IV CONTRAST  07/27/2020    MR NECK ANGIO WO IV CONTRAST 7/27/2020 [facility]    MR NECK ANGIO WO IV CONTRAST  05/18/2017    MR NECK ANGIO WO IV CONTRAST 5/18/2017 [facility]    OTHER SURGICAL HISTORY  06/03/2013    Craniotomy Supratentorial Excision Of Meningioma    OTHER SURGICAL HISTORY  10/19/2020    Cataract surgery    OTHER SURGICAL HISTORY  03/13/2017    Arthrodesis Cervical    OTHER SURGICAL HISTORY  03/13/2017    Craniotomy Infratentorial Excision Of Meningioma    SPINE SURGERY  06/03/2013    Spine Repair    TUBAL LIGATION  05/19/2015    Tubal Ligation       Medications:      Prior to Admission medications    Medication Sig Start Date End Date Taking? Authorizing Provider   albuterol 90 mcg/actuation inhaler Inhale 2 puffs. Q6-8hrs prn sob 5/29/13   Historical Provider, MD   amLODIPine (Norvasc) 2.5 mg tablet Take 1 tablet (2.5 mg) by mouth 2 times a day. 4/16/24   Page Lopez MD   amoxicillin (Amoxil) 500 mg capsule Take 4 capsules (2,000 mg) by mouth. 1 HOUR PRIOR TO DENTAL PROCEDURE 6/1/23   Historical Provider, MD   amoxicillin (Amoxil) 500 mg capsule Take 2 cap , With breakfast and dinner. 12/19/23   Page Lopez MD   calcium carbonate-vitamin D3 600 mg-20 mcg (800 unit) tablet Take 1 tablet by mouth once daily.    Historical Provider, MD   cetirizine (ZyrTEC) 10 mg tablet Take 1 tablet (10 mg) by mouth once daily. 9/16/13   Historical Provider, MD   cholecalciferol (Vitamin D-3) 50 mcg (2,000 unit) capsule Take 3 capsules (150 mcg) by mouth early in the morning..    Historical Provider, MD   clobetasol (Temovate) 0.05 % cream 1 Application 11/18/16   Historical Provider, MD   clotrimazole-betamethasone (Lotrisone) cream 1 Application 4/15/15   Historical Provider, MD   cyanocobalamin (Vitamin B-12) 100 mcg tablet Take 1 tablet (100 mcg) by mouth once daily.    Historical Provider, MD   famotidine (Pepcid) 20 mg tablet Take 1 tablet (20 mg) by mouth once daily in the morning. 8/17/22   Historical Provider, MD   famotidine (Pepcid) 40 mg tablet TAKE 1 TABLET BY MOUTH EVERYDAY AT BEDTIME 3/18/24   Page Lopez MD   fluticasone (Flonase) 50 mcg/actuation nasal spray Administer 1 spray into affected nostril(s) once daily as needed.    Historical Provider, MD   hydroCHLOROthiazide (Microzide) 12.5 mg capsule TAKE 1 CAPSULE BY MOUTH EVERY DAY IN THE MORNING 2/26/24   Page Lopez MD   LORazepam (Ativan) 0.5 mg tablet Take 1 tablet (0.5 mg) by mouth once daily as needed.    Historical Provider, MD   lovastatin (Mevacor) 20 mg tablet Take 1 tablet (20 mg)  by mouth once daily. 4/3/24 9/30/24  Page Lopez MD   MAGNESIUM GLUCONATE ORAL Take 500 mg by mouth once daily.    Historical Provider, MD   nystatin (Mycostatin) cream Apply topically 2 times a day. Apply to affected areas 11/25/23 11/24/24  Anastacia Pack MD   venlafaxine XR (Effexor-XR) 150 mg 24 hr capsule Take 1 capsule (150 mg) by mouth once daily.    Historical Provider, MD   venlafaxine XR (Effexor-XR) 37.5 mg 24 hr capsule TAKE ONE ALONG WITH 150 MG DAILY 7/24/23   Historical Provider, MD   vitamin E mixed 400 unit capsule Take 1 capsule by mouth once daily.    Historical Provider, MD MULLER  Review of Systems   Constitutional: Negative.    HENT: Negative.     Eyes: Negative.    Respiratory: Negative.     Cardiovascular: Negative.    Gastrointestinal: Negative.    Endocrine: Negative.    Genitourinary: Negative.    Musculoskeletal: Negative.    Neurological: Negative.    Psychiatric/Behavioral: Negative.          Blood, Urine   Date Value Ref Range Status   04/30/2021 NEGATIVE NEGATIVE Final     Nitrite, Urine   Date Value Ref Range Status   04/30/2021 NEGATIVE NEGATIVE Final     Urobilinogen, Urine   Date Value Ref Range Status   04/30/2021 <2.0 0.0 - 1.9 mg/dL Final         PHYSICAL EXAM:    There were no vitals taken for this visit.    Declines chaperone for physical exam.      Well developed, well nourished, in no apparent distress.   Neurologic/Psychiatric:  Awake, Alert and Oriented times 3.  Affect normal.     GENITAL/URINARY:     External Genitalia:  The patient has normal appearing external genitalia, normal skenes and bartholins glands, and a normal hair distribution.  Her vulva is without lesions, erythema or discharge.  It is non-tender with appropriate sensation. There are skin changes consistent with lichen sclerosus but no lesions or masses.     Urethral Meatus:  Size normal, Location normal, Lesions absent, Prolapse absent.    Urethra:  Fullness absent, Masses  absent.    Bladder:  Fullness absent, Masses absent, Tenderness absent.      Vagina:  General appearance normal, Estrogen effect normal, Discharge absent, Lesions absent. The midurethral sling in place with no mesh erosion visualized or palpated. Well-healed uterosacral ligament suspension, well suspended apex without prolapse. Cuff is intact and well-healed. No masses and no foreign body confirmed on bimanual exam.     Cervix: surgically absent  Uterus:  surgically absent     Anus/Perineum:  Normal perineum.     Stress urinary incontinence not demonstrable.         POP-Q:  Stage: 0  Position: sitting    Data and DIAGNOSTIC STUDIES REVIEWED   XR knee right 3 views    Result Date: 4/18/2024  Interpreted By:  Jennifer Levine, STUDY: Right knee, 3 views.   INDICATION: Signs/Symptoms:R TKA   COMPARISON: 12/13/2023.   ACCESSION NUMBER(S): [id number]   ORDERING CLINICIAN: SYLVIE ESCALNATE   FINDINGS: No acute fracture or malalignment. Patient is status post right total knee arthroplasty. Hardware is intact without perihardware fractures or lucencies. Alignment is anatomic. No significant knee joint effusion.       1. Right total knee arthroplasty without hardware complication.   MACRO: None.   Signed by: Jennifer Levine 4/18/2024 7:31 PM Dictation workstation:   [id number]    XR hips bilateral 2 VW w pelvis when performed    Result Date: 4/3/2024  Interpreted By:  Paxton Talavera, STUDY: XR HIPS BILATERAL 2 VW WITH PELVIS WHEN PERFORMED; ;  4/2/2024 2:48 pm   INDICATION: Signs/Symptoms:hip pain.   COMPARISON: None.   ACCESSION NUMBER(S): [id number]   ORDERING CLINICIAN: SOFIA GONZALES   FINDINGS: Bilateral hips, five views   There is no evidence of a fracture. There is no dislocation. The hip joints are maintained. Mild osteophytosis present hips bilaterally in the pubic symphysis       Mild degenerative change. No acute abnormality     MACRO: None   Signed by: Paxton Talavera 4/3/2024 7:09 PM Dictation  workstation:   [id number]    XR lumbar spine 2-3 views    Result Date: 4/3/2024  Interpreted By:  Paxton Talavera, STUDY: XR LUMBAR SPINE 2-3 VIEWS; ;  4/2/2024 2:48 pm   INDICATION: Signs/Symptoms:lower back pain and pelvic pain.   COMPARISON: 05/11/2016   ACCESSION NUMBER(S): [id number]   ORDERING CLINICIAN: SOFIA GONZALES   FINDINGS: Lumbar spine, three views   There is moderate facet disease in the lower lumbar spine. There is moderate disc space narrowing osteophytosis at L4-5 and L5-S1. There is mild anterolisthesis L4-L5 and L5-S1. There is no fracture       Moderate spondylosis and facet disease lower lumbar spine. Mild anterolisthesis as well. No fracture seen.     MACRO: None   Signed by: Paxton Talavera 4/3/2024 7:04 PM Dictation workstation:   [id number]     Lab Results   Component Value Date    URINECULTURE CANCELED 08/14/2023      Lab Results   Component Value Date    GLUCOSE 90 03/27/2024    CALCIUM 9.9 03/27/2024     03/27/2024    K 4.2 03/27/2024    CO2 29 03/27/2024    CL 99 03/27/2024    BUN 24 (H) 03/27/2024    CREATININE 1.00 03/27/2024     Lab Results   Component Value Date    WBC 7.5 03/27/2024    HGB 14.2 03/27/2024    HCT 44.6 03/27/2024    MCV 86 03/27/2024     03/27/2024          Anastacia Pack MD

## 2024-05-03 ASSESSMENT — ENCOUNTER SYMPTOMS
CARDIOVASCULAR NEGATIVE: 1
RESPIRATORY NEGATIVE: 1
NEUROLOGICAL NEGATIVE: 1
GASTROINTESTINAL NEGATIVE: 1
MUSCULOSKELETAL NEGATIVE: 1
PSYCHIATRIC NEGATIVE: 1
EYES NEGATIVE: 1
ENDOCRINE NEGATIVE: 1
CONSTITUTIONAL NEGATIVE: 1

## 2024-05-15 ENCOUNTER — APPOINTMENT (OUTPATIENT)
Dept: RADIATION ONCOLOGY | Facility: HOSPITAL | Age: 74
End: 2024-05-15
Payer: MEDICARE

## 2024-05-20 ENCOUNTER — OFFICE VISIT (OUTPATIENT)
Dept: ORTHOPEDIC SURGERY | Facility: CLINIC | Age: 74
End: 2024-05-20
Payer: MEDICARE

## 2024-05-20 VITALS — HEIGHT: 64 IN | WEIGHT: 186 LBS | BODY MASS INDEX: 31.76 KG/M2

## 2024-05-20 DIAGNOSIS — M54.16 LUMBAR RADICULITIS: Primary | ICD-10-CM

## 2024-05-20 PROCEDURE — 1160F RVW MEDS BY RX/DR IN RCRD: CPT | Performed by: PHYSICIAN ASSISTANT

## 2024-05-20 PROCEDURE — 1159F MED LIST DOCD IN RCRD: CPT | Performed by: PHYSICIAN ASSISTANT

## 2024-05-20 PROCEDURE — 99214 OFFICE O/P EST MOD 30 MIN: CPT | Performed by: PHYSICIAN ASSISTANT

## 2024-05-20 PROCEDURE — 1036F TOBACCO NON-USER: CPT | Performed by: PHYSICIAN ASSISTANT

## 2024-05-20 PROCEDURE — 1125F AMNT PAIN NOTED PAIN PRSNT: CPT | Performed by: PHYSICIAN ASSISTANT

## 2024-05-20 ASSESSMENT — PAIN - FUNCTIONAL ASSESSMENT: PAIN_FUNCTIONAL_ASSESSMENT: 0-10

## 2024-05-20 ASSESSMENT — PAIN SCALES - GENERAL: PAINLEVEL_OUTOF10: 3

## 2024-05-20 NOTE — PROGRESS NOTES
Roseanne is a 73-year-old female that presents today to be evaluated for lower extremity symptoms.    She has very minimal back pain but states that a lot of her symptoms are localized to the anterior quadricep, predominantly on the lateral aspect.  She has noticed that over time she has had a decreased ability to walk distance.  She has more hip pain associated with walking, if she is at rest the pain is very minimal.  She had her hips worked up and they ruled out any type of arthritis.  No groin pain no problems with her bowel or bladder function she is not dragging or tripping over her feet and she denies any recent falls or injuries.    From a treatment standpoint she has not done any recent physical therapy or injections for the lumbar spine in the last 6 months.  She takes Tylenol and Advil as needed.    Family, social, and medical histories are obtained and reviewed.  -She has hypertension, managed with medication.  She has history of uterine cancer and breast cancer both treated surgically.  She has carotid artery stenosis and also has history of meningiomas.    I reviewed the complete 30-point review of systems that was documented on the scanned patient intake form.  All other systems are non-contributory except as defined in history of present illness.    Const: Well-appearing, well-nourished female in no distress.  Eyes: Normal appearing sclera and conjunctiva, no jaundice, pupils normal in appearance.  Resp: breathing comfortably, normal respiratory rate.  CV: No upper or lower extremity edema.  Musculoskeletal: Normal gait.  Able to heel/toe walk without difficulty.  Lumbar ROM is supple.  Strength exam of the lower extremities reveals 5/5 strength in all major muscle groups .  Negative straight leg raise bilaterally.  Neuro: Sensation is intact and equal bilaterally. Deep tendon reflexes are normal and symmetric.  No clonus.  Skin: Intact without any lesions, normal turgor.  Psych: Alert and oriented x3,  normal mood and affect.    The patient had x-rays of her lumbar spine done prior to today.  She is here to review those.  The results reveal that she has degenerative changes throughout the lumbar spine but most notably from the L3 down to S1.  She does have spondylolisthesis is noted slightly at the L4-5 and L5-S1.    Moving forward, I did inform the patient that the next thing in her workup would be to continue conservative care of either physical therapy or pain management.  As of now she would like to hold off on any further workup, she is dealing with a lot with the meningioma workup that she has coming in the next couple weeks.  We will follow-up with her for clinical recheck in 6 to 8 weeks.  If she is not improving we will then discuss further conservative treatment options.    This note was dictated using speech recognition software and was not corrected for spelling or grammatical errors

## 2024-05-28 ENCOUNTER — OFFICE VISIT (OUTPATIENT)
Dept: ORTHOPEDIC SURGERY | Facility: HOSPITAL | Age: 74
End: 2024-05-28
Payer: MEDICARE

## 2024-05-28 ENCOUNTER — HOSPITAL ENCOUNTER (OUTPATIENT)
Dept: RADIOLOGY | Facility: EXTERNAL LOCATION | Age: 74
Discharge: HOME | End: 2024-05-28

## 2024-05-28 DIAGNOSIS — M17.12 PRIMARY OSTEOARTHRITIS OF LEFT KNEE: ICD-10-CM

## 2024-05-28 PROCEDURE — 2500000004 HC RX 250 GENERAL PHARMACY W/ HCPCS (ALT 636 FOR OP/ED): Mod: JZ | Performed by: FAMILY MEDICINE

## 2024-05-28 PROCEDURE — 20611 DRAIN/INJ JOINT/BURSA W/US: CPT | Mod: LT | Performed by: FAMILY MEDICINE

## 2024-05-28 PROCEDURE — 1160F RVW MEDS BY RX/DR IN RCRD: CPT | Performed by: FAMILY MEDICINE

## 2024-05-28 PROCEDURE — 99214 OFFICE O/P EST MOD 30 MIN: CPT | Performed by: FAMILY MEDICINE

## 2024-05-28 PROCEDURE — 1159F MED LIST DOCD IN RCRD: CPT | Performed by: FAMILY MEDICINE

## 2024-05-28 RX ADMIN — Medication 48 MG: at 11:40

## 2024-05-28 NOTE — PROGRESS NOTES
Patient ID: Roseanne Lira is a 73 y.o. female.    L Inj/Asp: L knee on 5/28/2024 11:40 AM  Indications: pain  Details: 21 G needle, ultrasound-guided superolateral approach  Medications: 48 mg hylan 48 mg/6 mL  Outcome: tolerated well, no immediate complications  Procedure, treatment alternatives, risks and benefits explained, specific risks discussed. Consent was given by the patient. Immediately prior to procedure a time out was called to verify the correct patient, procedure, equipment, support staff and site/side marked as required. Patient was prepped and draped in the usual sterile fashion.       Sports Medicine Office Note    Today's Date:  05/28/2024     HPI: Roseanne Lira is a 73 y.o. retired female here for follow-up of chronic left knee pain.       On 6/27/2023, she presented for evaluation of chronic left knee pain upon referral by Dr. Dillon.  She was recently evaluated for her pain while following up for right knee arthroplasty. She uses a cane for ambulation. She reports having gel injections into both knees in the past with Dr. Nice, over 6 years ago. This gave her good relief. She would like to try it again with her left knee. She denies interval injury or trauma since her last visit with our office. We agreed to pursue insurance approval for viscosupplementation to help her chronic left knee DJD pain. She will continue her current conservative treatment plan otherwise. She will follow-up when she has insurance approval.     On 9/29/2023, she returns for left knee viscosupplementation. Denies interval injury or trauma to the left knee. She has continued to do her home physical therapy exercises, which she feels have been helpful.   We agreed to proceed with a trial of viscosupplementation with Synvisc 1. She tolerated this well. Activity modifications were reviewed. She will follow-up in 8 weeks.     On 12/13/2023, she returns for 2-1/2-month follow-up of chronic left knee pain status post  a trial of Synvisc hyaluronic acid injection.  She reports she is doing much better and is having less pain.  She did have a recent flareup 2 days ago after [place] airport but otherwise she is very happy with her improvement.  She denies interval injury or trauma.  We agreed that she has responded very well to the trial of viscosupplementation with Synvisc at her left knee.  She understands this can be repeated every 6 months or later if needed.  We agreed upon a referral to physical therapy for strengthening of her lower extremities.  She will continue her current conservative treatment plan.  She will follow-up when her pain returns.    Today, 5/28/2024, she returns for 6.5-month follow-up of chronic left knee pain with DJD.  She has been approved to start viscosupplementation.  The previous Synvisc 1 injection given on 9/29/2023 gave her 7 months of very good relief and has gradually started to wear off.  She is considering getting her knee replaced this winter with Dr. Dillon, who replaced her opposite knee.  She denies interval injury or trauma.    She has no other complaints.    Physical Examination:   The RIGHT knee is nontender and stable. There is a well-healed scar.  The LEFT knee has no joint effusion. Patella crepitus is positive. There is no tenderness to the medial and lateral joint lines. Flexion and extension are without mechanical blocking. There is no instability with stress testing.   Skin - no rashes, sores, or open lesions. Strength, sensory and vascular exams are otherwise normal. There is no clubbing, cyanosis or edema.  Gait is slightly antalgic and tandem.  Her  Problem List Items Addressed This Visit             ICD-10-CM    Osteoarthritis of left knee M17.12    Relevant Orders    Point of Care Ultrasound (Completed)    L Inj/Asp: L knee       Assessment and Plan:     We reviewed the exam and x-ray findings and discussed the conservative and surgical treatment  options. We agreed to repeat Synvisc 1 viscosupplementation today at the left knee.  She tolerated this well.  Activity modifications were reviewed.  She is trying to schedule joint replacement this winter with Dr. Dillon.  I am happy to see her back as needed.    **This note was dictated using Dragon speech recognition software and was not corrected for spelling or grammatical errors**.    Palomo Stephenson MD  Sports Medicine Specialist  [facility] [place]

## 2024-06-03 DIAGNOSIS — J45.20 INTERMITTENT ASTHMA, UNSPECIFIED ASTHMA SEVERITY, UNSPECIFIED WHETHER COMPLICATED (HHS-HCC): ICD-10-CM

## 2024-06-03 RX ORDER — ALBUTEROL SULFATE 90 UG/1
2 AEROSOL, METERED RESPIRATORY (INHALATION) DAILY PRN
Qty: 18 G | Refills: 0 | Status: SHIPPED | OUTPATIENT
Start: 2024-06-03

## 2024-06-13 ENCOUNTER — HOSPITAL ENCOUNTER (OUTPATIENT)
Dept: RADIOLOGY | Facility: CLINIC | Age: 74
Discharge: HOME | End: 2024-06-13
Payer: MEDICARE

## 2024-06-13 DIAGNOSIS — M81.0 AGE RELATED OSTEOPOROSIS, UNSPECIFIED PATHOLOGICAL FRACTURE PRESENCE: ICD-10-CM

## 2024-06-13 PROCEDURE — 77080 DXA BONE DENSITY AXIAL: CPT

## 2024-06-17 ENCOUNTER — APPOINTMENT (OUTPATIENT)
Dept: RHEUMATOLOGY | Facility: CLINIC | Age: 74
End: 2024-06-17
Payer: MEDICARE

## 2024-06-17 VITALS
HEART RATE: 62 BPM | SYSTOLIC BLOOD PRESSURE: 110 MMHG | DIASTOLIC BLOOD PRESSURE: 73 MMHG | BODY MASS INDEX: 31.93 KG/M2 | WEIGHT: 186 LBS

## 2024-06-17 DIAGNOSIS — H53.9 VISION CHANGES: Primary | ICD-10-CM

## 2024-06-17 DIAGNOSIS — M85.80 OSTEOPENIA, UNSPECIFIED LOCATION: ICD-10-CM

## 2024-06-17 DIAGNOSIS — M79.7 FIBROMYALGIA: ICD-10-CM

## 2024-06-17 DIAGNOSIS — M54.50 CHRONIC BILATERAL LOW BACK PAIN WITHOUT SCIATICA: ICD-10-CM

## 2024-06-17 DIAGNOSIS — G89.29 CHRONIC BILATERAL LOW BACK PAIN WITHOUT SCIATICA: ICD-10-CM

## 2024-06-17 DIAGNOSIS — M35.3 PMR (POLYMYALGIA RHEUMATICA) (MULTI): ICD-10-CM

## 2024-06-17 PROCEDURE — 3074F SYST BP LT 130 MM HG: CPT | Performed by: STUDENT IN AN ORGANIZED HEALTH CARE EDUCATION/TRAINING PROGRAM

## 2024-06-17 PROCEDURE — 1036F TOBACCO NON-USER: CPT | Performed by: STUDENT IN AN ORGANIZED HEALTH CARE EDUCATION/TRAINING PROGRAM

## 2024-06-17 PROCEDURE — 99204 OFFICE O/P NEW MOD 45 MIN: CPT | Performed by: STUDENT IN AN ORGANIZED HEALTH CARE EDUCATION/TRAINING PROGRAM

## 2024-06-17 PROCEDURE — 3078F DIAST BP <80 MM HG: CPT | Performed by: STUDENT IN AN ORGANIZED HEALTH CARE EDUCATION/TRAINING PROGRAM

## 2024-06-17 PROCEDURE — 1159F MED LIST DOCD IN RCRD: CPT | Performed by: STUDENT IN AN ORGANIZED HEALTH CARE EDUCATION/TRAINING PROGRAM

## 2024-06-17 NOTE — PROGRESS NOTES
Subjective   Patient ID: Roseanne Lira is a 73 y.o. female who presents for New Patient Visit (NPV to establish care. Referred by PCP. C/o pmr concerns.).  HPI: Female with a history of polymyalgia rheumatica in remission; last saw Dr. 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 Has lower back pain for a few min    Rheumatology specific review of systems  Regular joint pain, morning stiffness>30 min, fevers , chills, unintentional weight loss, rashes, alopecia, mouth sores, nasal ulcers, malar rash,  Raynauds, morning stiffness in back>30 min, dry eyes, dry mouth, blood clots, recurrent miscarriages, rheum fam hx, uveitis, blood or mucus in stool     Chronic pain specific review of systems   widespread pain , widespread tenderness, brain fog, depression/anxiety, migraines or tension headaches, IBS or heartburn symptoms, irritable or overactive bladder, pelvic pain ,TMJ pain,poor sleep, fatigue    Objective   /73 (BP Location: Left arm, Patient Position: Sitting, BP Cuff Size: Large adult)   Pulse 62   Wt 84.4 kg (186 lb)   BMI 31.93 kg/m²       Physical Exam  Constitutional: Alert and in  no acute distress. Well developed, well nourished  Head and Face: Head and face: Normal.    Cardiovascular: Heart rate and rhythm were normal, normal S1 and S2. No peripheral edema.   Pulmonary: No respiratory distress. Clear bilateral breath sounds.  Musculoskeletal:   Joint exam:  Strength exam:  Skin: Normal skin color and pigmentation, normal skin turgor, and no rash.    Psychiatric: Judgment and insight: Intact. Mood and affect: Normal.     Lab Results   Component Value Date    WBC 7.5 03/27/2024    HGB 14.2 03/27/2024    HCT 44.6 03/27/2024     03/27/2024    ALT 12 03/27/2024    AST 14 03/27/2024    CREATININE 1.00 03/27/2024          Lab Results   Component Value Date    SEDRATE 14 07/05/2019    CRP 4.21 (A) 07/05/2019   \\            There is currently no information documented on the homunculus. Go to the Rheumatology activity and complete the homunculus joint exam.      XR DEXA bone density  Narrative: Interpreted By:  Ever Bains,   STUDY:  DEXA BONE DENSITY6/13/2024 11:46 am      INDICATION:  Signs/Symptoms:Age related osteoporosis. The patient is a 72 y/o  year old F.      COMPARISON:  Most recent prior is from 12/20/2021.      ACCESSION NUMBER(S):  [id number]      ORDERING CLINICIAN:  SOFIA GONZALES      TECHNIQUE:  DEXA BONE DENSITY      FINDINGS:  SPINE L1-L4  Bone Mineral Density: 0.93  T-Score -1.1  Z-Score 1.3  Bone Mineral Density change vs baseline:  -15.3  Bone Mineral Density change vs previous: -1.8      LEFT FEMUR -TOTAL  Bone Mineral Density: 0.861  T-Score -0.7   Z-Score  1.1  Bone Mineral Density change vs baseline: -10.1  Bone Mineral Density change vs previous: -2.2      LEFT FEMUR -NECK  Bone Mineral Density: 0.717  T-Score -1.2  Z-Score 0.9  Bone Mineral Density change vs baseline: -13.0  Bone Mineral Density change vs previous: 2.9          World Health Organization (WHO) criteria for post-menopausal,   Women:  Normal:         T-score at or above -1  SD  Osteopenia:   T-score between -1 and -2.5 SD  Osteoporosis: T-score at or below -2.5 SD          10-year Fracture Risk:  Major Osteoporotic Fracture  9.8  Hip Fracture                        1.6      Note:  If no FRAX score is reported, it is because:  Some T-score for Spine Total or Hip Total or Femoral Neck at or below  -2.5      This exam was performed at [facility] on a Hologic  [facility] Dexa Unit.      Impression: DEXA:  According to World Health Organization criteria,  classification is low bone mass (osteopenia)      Followup recommended in two years or sooner as clinically warranted.      All images and detailed analysis are available on the  Radiology  PACS.      MACRO:  None      Signed by: Ever Bains 6/13/2024 12:56 PM  Dictation workstation:   [id number]      === 06/13/24 ===    DEXA BONE DENSITY    - Impression -  DEXA:  According to World Health Organization criteria,  classification is low bone mass (osteopenia)    Followup recommended in two years or sooner as clinically warranted.    All images and detailed analysis are available on the  Radiology  PACS.    MACRO:  None    Signed by: Ever Bains 6/13/2024 12:56 PM  Dictation workstation:   [id number]    Assessment/Plan:  #PMR  -I reviewed Dr. Antunez's records that patient brought with her on June 2024  -in remission, but still has subjectively weak arms  -Symptomatically, still in remission.  Will get baseline ESR and CRP-ordered June 2024  -Patient counseled on symptoms of giant cell arteritis and instructed to go to the ED immediately ([facility] is preferable) if they get new vision changes or severe headache, they were instructed that they should tell the ED physician that their rheumatologist has told them that they may be at risk for giant cell arteritis and high dose steroids should be started while they are working it up.  -Has chronic vision changes; per patient, her neurologist Dr. Robledo does not think  this is related to her migraine.  Referral placed to neuro-ophthalmology Dr. Leal June 2024    #OA  -Sees orthopedics, has a right knee replacement and is pending a left knee replacement  -Sees orthospine for her osteoarthritis  -Requesting a referral for acupuncture as this has helped in the past; referral placed June 2024 but patient counseled to call and see if her insurance covers it, and if not, how much her out-of-pocket cost will be    #Osteopenia  -on 6.2024 dexa- repeat 6.2026  -continue vit d 5000 units daily   -recommended eating extra ca in diet (patient with hx of kidney stones)  -walk as tolerated    Patient counseled to seek medical care if any new or worsening symptoms, urgently if needed.      Note will be sent to primary care doctor.    Return to clinic in 1 year, sooner if needed    Dragon dictation software was used to dictate this note. Errors may have occurred during dictation that was not intended by the user.

## 2024-06-17 NOTE — PATIENT INSTRUCTIONS
Continue Vitamin D3 5000 units a day     Eat extra calcium in your diet     See Dr Leal neuro-ophthalmology- I referred you     See [facility] for acupuncture if you're interested

## 2024-06-19 DIAGNOSIS — I10 ESSENTIAL (PRIMARY) HYPERTENSION: ICD-10-CM

## 2024-06-19 RX ORDER — HYDROCHLOROTHIAZIDE 12.5 MG/1
12.5 CAPSULE ORAL EVERY MORNING
Qty: 90 CAPSULE | Refills: 1 | Status: SHIPPED | OUTPATIENT
Start: 2024-06-19

## 2024-07-02 ENCOUNTER — APPOINTMENT (OUTPATIENT)
Dept: PRIMARY CARE | Facility: CLINIC | Age: 74
End: 2024-07-02
Payer: MEDICARE

## 2024-07-09 ENCOUNTER — LAB (OUTPATIENT)
Dept: LAB | Facility: LAB | Age: 74
End: 2024-07-09
Payer: MEDICARE

## 2024-07-09 DIAGNOSIS — M35.3 PMR (POLYMYALGIA RHEUMATICA) (MULTI): ICD-10-CM

## 2024-07-09 DIAGNOSIS — N18.31 CKD STAGE 3A, GFR 45-59 ML/MIN (MULTI): ICD-10-CM

## 2024-07-09 DIAGNOSIS — E78.5 DYSLIPIDEMIA: ICD-10-CM

## 2024-07-09 LAB
ALBUMIN SERPL BCP-MCNC: 4.6 G/DL (ref 3.4–5)
ALP SERPL-CCNC: 60 U/L (ref 33–136)
ALT SERPL W P-5'-P-CCNC: 14 U/L (ref 7–45)
AST SERPL W P-5'-P-CCNC: 16 U/L (ref 9–39)
BILIRUB DIRECT SERPL-MCNC: 0.1 MG/DL (ref 0–0.3)
BILIRUB SERPL-MCNC: 0.5 MG/DL (ref 0–1.2)
CHOLEST SERPL-MCNC: 242 MG/DL (ref 0–199)
CHOLESTEROL/HDL RATIO: 3.8
CRP SERPL-MCNC: 1.6 MG/DL
ERYTHROCYTE [SEDIMENTATION RATE] IN BLOOD BY WESTERGREN METHOD: 22 MM/H (ref 0–30)
HDLC SERPL-MCNC: 63 MG/DL
LDLC SERPL CALC-MCNC: 143 MG/DL
NON HDL CHOLESTEROL: 179 MG/DL (ref 0–149)
PROT SERPL-MCNC: 7.7 G/DL (ref 6.4–8.2)
TRIGL SERPL-MCNC: 178 MG/DL (ref 0–149)
VLDL: 36 MG/DL (ref 0–40)

## 2024-07-09 PROCEDURE — 80061 LIPID PANEL: CPT

## 2024-07-09 PROCEDURE — 80053 COMPREHEN METABOLIC PANEL: CPT

## 2024-07-09 PROCEDURE — 85652 RBC SED RATE AUTOMATED: CPT

## 2024-07-09 PROCEDURE — 36415 COLL VENOUS BLD VENIPUNCTURE: CPT

## 2024-07-09 PROCEDURE — 86140 C-REACTIVE PROTEIN: CPT

## 2024-07-09 PROCEDURE — 82248 BILIRUBIN DIRECT: CPT

## 2024-07-10 ENCOUNTER — APPOINTMENT (OUTPATIENT)
Dept: ORTHOPEDIC SURGERY | Facility: CLINIC | Age: 74
End: 2024-07-10
Payer: MEDICARE

## 2024-07-10 DIAGNOSIS — M54.16 LUMBAR RADICULITIS: ICD-10-CM

## 2024-07-10 PROCEDURE — 1160F RVW MEDS BY RX/DR IN RCRD: CPT | Performed by: PHYSICIAN ASSISTANT

## 2024-07-10 PROCEDURE — 1036F TOBACCO NON-USER: CPT | Performed by: PHYSICIAN ASSISTANT

## 2024-07-10 PROCEDURE — 1159F MED LIST DOCD IN RCRD: CPT | Performed by: PHYSICIAN ASSISTANT

## 2024-07-10 PROCEDURE — 99213 OFFICE O/P EST LOW 20 MIN: CPT | Performed by: PHYSICIAN ASSISTANT

## 2024-07-10 NOTE — PROGRESS NOTES
Roseanne returns today for a follow-up appointment.    We have been following her for the last couple months in regards to her low back pain with lower extremity symptoms.  Initially she wanted to hold off but as of now she is considering treatment again.  Her symptoms are starting to interfere with her daily activity.    We did discuss that she would be a good candidate to start with physical therapy.  She still has a lot going on, she is waiting to have a brain MRI done in August to see the condition of her meningioma and she is also awaiting a knee replacement in the coming months.  She will partake in physical therapy and will follow-up with us in about 6 to 8 weeks to see how she is doing.  If there is no improvement then we did discuss getting advanced imaging by means of an MRI.    I reviewed the complete 30-point review of systems that was documented on the scanned patient intake form.  All other systems are non-contributory except as defined in history of present illness.    This note was dictated using speech recognition software and was not corrected for spelling or grammatical errors

## 2024-07-11 ENCOUNTER — APPOINTMENT (OUTPATIENT)
Dept: PRIMARY CARE | Facility: CLINIC | Age: 74
End: 2024-07-11
Payer: MEDICARE

## 2024-07-11 VITALS
BODY MASS INDEX: 32.18 KG/M2 | SYSTOLIC BLOOD PRESSURE: 120 MMHG | RESPIRATION RATE: 16 BRPM | DIASTOLIC BLOOD PRESSURE: 70 MMHG | WEIGHT: 188.49 LBS | HEIGHT: 64 IN | HEART RATE: 58 BPM | TEMPERATURE: 97.3 F | OXYGEN SATURATION: 98 %

## 2024-07-11 DIAGNOSIS — M25.473 ANKLE EDEMA: ICD-10-CM

## 2024-07-11 DIAGNOSIS — R60.0 LOCALIZED EDEMA: ICD-10-CM

## 2024-07-11 DIAGNOSIS — K21.9 GASTRO-ESOPHAGEAL REFLUX DISEASE WITHOUT ESOPHAGITIS: ICD-10-CM

## 2024-07-11 DIAGNOSIS — M79.7 FIBROMYALGIA: ICD-10-CM

## 2024-07-11 DIAGNOSIS — E78.5 DYSLIPIDEMIA: ICD-10-CM

## 2024-07-11 DIAGNOSIS — N18.31 CKD STAGE 3A, GFR 45-59 ML/MIN (MULTI): Primary | ICD-10-CM

## 2024-07-11 DIAGNOSIS — R09.89 LABILE HYPERTENSION: ICD-10-CM

## 2024-07-11 LAB
ANION GAP SERPL CALC-SCNC: 23 MMOL/L (ref 10–20)
BUN SERPL-MCNC: 18 MG/DL (ref 6–23)
CALCIUM SERPL-MCNC: 9.7 MG/DL (ref 8.6–10.6)
CHLORIDE SERPL-SCNC: 101 MMOL/L (ref 98–107)
CO2 SERPL-SCNC: 20 MMOL/L (ref 21–32)
CREAT SERPL-MCNC: 1.07 MG/DL (ref 0.5–1.05)
EGFRCR SERPLBLD CKD-EPI 2021: 55 ML/MIN/1.73M*2
GLUCOSE SERPL-MCNC: 101 MG/DL (ref 74–99)
POTASSIUM SERPL-SCNC: 4.1 MMOL/L (ref 3.5–5.3)
SODIUM SERPL-SCNC: 140 MMOL/L (ref 136–145)

## 2024-07-11 PROCEDURE — 1160F RVW MEDS BY RX/DR IN RCRD: CPT | Performed by: INTERNAL MEDICINE

## 2024-07-11 PROCEDURE — 1159F MED LIST DOCD IN RCRD: CPT | Performed by: INTERNAL MEDICINE

## 2024-07-11 PROCEDURE — 3074F SYST BP LT 130 MM HG: CPT | Performed by: INTERNAL MEDICINE

## 2024-07-11 PROCEDURE — 1036F TOBACCO NON-USER: CPT | Performed by: INTERNAL MEDICINE

## 2024-07-11 PROCEDURE — 3078F DIAST BP <80 MM HG: CPT | Performed by: INTERNAL MEDICINE

## 2024-07-11 PROCEDURE — 99214 OFFICE O/P EST MOD 30 MIN: CPT | Performed by: INTERNAL MEDICINE

## 2024-07-11 RX ORDER — FAMOTIDINE 40 MG/1
40 TABLET, FILM COATED ORAL NIGHTLY
Qty: 90 TABLET | Refills: 1 | Status: SHIPPED | OUTPATIENT
Start: 2024-07-11

## 2024-07-11 RX ORDER — FUROSEMIDE 20 MG/1
TABLET ORAL
Qty: 30 TABLET | Refills: 1 | Status: SHIPPED | OUTPATIENT
Start: 2024-07-11

## 2024-07-11 ASSESSMENT — COLUMBIA-SUICIDE SEVERITY RATING SCALE - C-SSRS
1. IN THE PAST MONTH, HAVE YOU WISHED YOU WERE DEAD OR WISHED YOU COULD GO TO SLEEP AND NOT WAKE UP?: NO
2. HAVE YOU ACTUALLY HAD ANY THOUGHTS OF KILLING YOURSELF?: NO
6. HAVE YOU EVER DONE ANYTHING, STARTED TO DO ANYTHING, OR PREPARED TO DO ANYTHING TO END YOUR LIFE?: NO

## 2024-07-11 ASSESSMENT — PATIENT HEALTH QUESTIONNAIRE - PHQ9
2. FEELING DOWN, DEPRESSED OR HOPELESS: NOT AT ALL
1. LITTLE INTEREST OR PLEASURE IN DOING THINGS: NOT AT ALL
SUM OF ALL RESPONSES TO PHQ9 QUESTIONS 1 AND 2: 0

## 2024-07-11 ASSESSMENT — ENCOUNTER SYMPTOMS
DEPRESSION: 0
LOSS OF SENSATION IN FEET: 0
OCCASIONAL FEELINGS OF UNSTEADINESS: 0

## 2024-07-11 NOTE — PROGRESS NOTES
"Subjective   Patient ID: Roseanne Lira is a 73 y.o. female who presents for bp, review consults, new labs  HPI   Recently she started limited bread and sweets intake, wants to loose some weight.  As per our recommendation she reduced ibuprofen to 400 mg x 2 x week and acetaminophen 1,000 bid few times x week. Add cannabis topical for bilateral knee pain. Received 6 weeks ago steroid injection without benefits, she saw surgeon and she is schedule for December total knee replacement.  Brings another lipid panel with no statins yet, reduction of LDL to 140,   Rheumatology advised for her to see neuro ophthalmologist due to aura without headaches and concern for arthritis, sed rate normal. Her PMR is stable and off prednisone only bilateral mild weakness.  She feels since covid less motivated, less hobbies, we discussed adjusting meds, she may discuss with pysquiatrist.    Review of Systems   Genitourinary:         Nocturia   Neurological:         Dizziness improved with increase hydration and some salt in her diet.   All other systems reviewed and are negative.      Objective   /70 (BP Location: Left arm, Patient Position: Sitting, BP Cuff Size: Adult)   Pulse 58   Temp 36.3 °C (97.3 °F)   Resp 16   Ht 1.626 m (5' 4\")   Wt 85.5 kg (188 lb 7.9 oz)   SpO2 98%   BMI 32.35 kg/m²     Physical Exam  Weight stable  Eyes - conjunctivae clear, PERRLA  HEENT - no impacted wax  Neck - no cervical lymphadenopathy, no thyromegaly, scar from remote surgery on right side  Axilla - no palpable lymphadenopathy  Cardiac- regular rate and rhythm, no murmurs, no carotid bruit, no JVP  Lung - clear to auscultation, no rales, no rhonchi, no wheezing  GI - normally active bowel sounds, non tender, non distended, no hepatosplenomegaly, no rebound  MSK - limites rom on lower back and cervical spine, warm righ knee post replacement, + mild stiffness of left knee  Extremities - no edema, good distal pulses  Neuro - non focal, " oriented x 3  Skin - no bruises, no rashes  Psychiatric - pleasant, well groom, no hallucinations    Assessment/Plan   Problem List Items Addressed This Visit             ICD-10-CM    Dyslipidemia E78.5     Pt agrees to start lovastatin , low frequency initially.          Gastro-esophageal reflux disease without esophagitis K21.9     Well control         Relevant Medications    famotidine (Pepcid) 40 mg tablet    Labile hypertension R09.89     Due to CKD, stop hydrochlorothiazide daily and replace with furosemide only PRN         Fibromyalgia M79.7     Pain control discussed today         Ankle edema M25.473     Will replace diuretic , re eval in 2m         CKD stage 3a, GFR 45-59 ml/min (Multi) - Primary N18.31    Relevant Orders    Basic Metabolic Panel (Completed)     Other Visit Diagnoses         Codes    Localized edema     R60.0    Relevant Medications    furosemide (Lasix) 20 mg tablet

## 2024-07-11 NOTE — PATIENT INSTRUCTIONS
Stop hydrochlorothiazide, start as needed furosemide, also start statin slowly , ok to use ocassionally ibuprofen or meloxciam, but more frequent use acetaminophen . Return in 2 months

## 2024-07-15 DIAGNOSIS — E78.5 DYSLIPIDEMIA: Primary | ICD-10-CM

## 2024-07-18 RX ORDER — LOVASTATIN 20 MG/1
20 TABLET ORAL NIGHTLY
COMMUNITY
End: 2024-07-18 | Stop reason: SDUPTHER

## 2024-07-18 RX ORDER — LOVASTATIN 20 MG/1
20 TABLET ORAL NIGHTLY
Qty: 90 TABLET | Refills: 1 | Status: SHIPPED | OUTPATIENT
Start: 2024-07-18

## 2024-07-29 ENCOUNTER — EVALUATION (OUTPATIENT)
Dept: PHYSICAL THERAPY | Facility: CLINIC | Age: 74
End: 2024-07-29
Payer: MEDICARE

## 2024-07-29 DIAGNOSIS — M25.562 KNEE PAIN, BILATERAL: ICD-10-CM

## 2024-07-29 DIAGNOSIS — M25.561 KNEE PAIN, BILATERAL: ICD-10-CM

## 2024-07-29 DIAGNOSIS — M79.671 RIGHT FOOT PAIN: ICD-10-CM

## 2024-07-29 DIAGNOSIS — M54.16 LUMBAR RADICULITIS: Primary | ICD-10-CM

## 2024-07-29 PROCEDURE — 97140 MANUAL THERAPY 1/> REGIONS: CPT | Mod: GP | Performed by: PHYSICAL THERAPIST

## 2024-07-29 PROCEDURE — 97162 PT EVAL MOD COMPLEX 30 MIN: CPT | Mod: GP | Performed by: PHYSICAL THERAPIST

## 2024-07-29 ASSESSMENT — PAIN SCALES - GENERAL: PAINLEVEL_OUTOF10: 5 - MODERATE PAIN

## 2024-07-29 ASSESSMENT — PAIN - FUNCTIONAL ASSESSMENT: PAIN_FUNCTIONAL_ASSESSMENT: 0-10

## 2024-07-29 NOTE — PROGRESS NOTES
Physical Therapy  Physical Therapy Orthopedic Evaluation    Patient Name: Roseanne Lira  MRN: [id number]  Today's Date: 7/29/2024  Time Calculation  Start Time: 1415  Stop Time: 1500  Time Calculation (min): 45 min  PT Evaluation Time Entry  PT Evaluation (Complex) Time Entry: 35, PT Therapeutic Procedures Time Entry  Manual Therapy Time Entry: 8,      Insurance:  Payor: [employer] / Plan: [employer]ICARE / Product Type: *No Product type* /   Number of Treatments Authorized: 1/MN  Certification Period Start Date: 07/30/24  Certification Period End Date: 10/27/24    Current Problem  1. Lumbar radiculitis  Referral to Physical Therapy    Follow Up In Physical Therapy      2. Right foot pain        3. Knee pain, bilateral            General:  General  Reason for Referral: Thoracic and LBP, R lateral foot pain, R knee pain  Referred By: Nicolasa Mirza Pa-C  Past Medical History Relevant to Rehab: Remote C2-4 fusion, Remote spinal fusion due to scoliosis, Remote craniotomy, Osteoarthritis, PMR,HTN,Cancer,Fibromyalgia. (Possible reoccurance of meningioma - being followed.)    Precautions:   Precautions  Precautions Comment: See medical history    Medical History Form: Reviewed (scanned into chart)    Subjective:   Subjective : Arrives with multiple complaints of LBP, R knee pain and R foot pain. States her foot is most troubling at this time, but would like to have all body parts assessed and treated. Reporting that her pain varies at each site. R knee is still painful from TKA in April of 2023. Believes she is still recovering from Aug 2023 Hysterectomy surgery as well. Unknown cause of R foot pain. Ms. Lira also states that she will be having a L TKA in December of 2024 and would like to have these other issues resolved prior to this surgery.     Pain:  Pain Assessment: 0-10  0-10 (Numeric) Pain Score: 5 - Moderate pain  Pain Type: Acute pain, Chronic pain  Pain Location:  (LBP, R knee pain, R foot  pain.)    Relevant Information (PMH & Previous Tests/Imaging): Spinal fusion history - Most recent imaging demonstrated moderate spondylosis and facet disease of L/S spine.   Previous Interventions/Treatments: None    Prior Level of Function (PLOF)  Prior Function Per Pt/Caregiver Report  Level of [place]: Independent with ADLs and functional transfers, Independent with homemaking with ambulation  Patient previously independent with all ADLs    Patients Living Environment:   Home Living Comment: Lives with spouse in multi-level home.    Primary Language: English    Red Flags: Do you have any of the following? No  Fever/chills, unexplained weight changes, dizziness/fainting, unexplained change in bowel or bladder functions, unexplained malaise or muscle weakness, night pain/sweats, numbness or tingling    Objective     Posture:  Flat thoracic spine due to fusion.     Hip AROM    WFL    Knee AROM  R: 0- 122  L: 0- 126 deg    Knee Strength  Extension: 5/5 bilaterally  Flexion: 4+/5 bilaterally    Ankle AROM  WNL in all planes.     Ankle Strength  5/5 throughout    Palpation  Tender at lateral R foot/ankle.  Tender at lateral maleoli with A/P grade II mobs - improved with reps.     Specific Lower Extremity MMT    Hip Flexion: 4+/5 bilaterally   Hip Abduction: 4-/5 bilaterally   Hip Extension: 4-/5 bilaterallly    Gait:   Slight antalgic with reduced stance time R due to mid-foot pain.     Outcome Measures:    Other Measures  Oswestry Disablity Index (KIARA): 16 =32%    EDUCATION: home exercise program, plan of care, activity modifications, pain management, and injury pathology  Outpatient Education  Risk and Benefits Discussed with Patient/Caregiver/Other: yes  Patient/Caregiver Demonstrated Understanding: yes  Plan of Care Discussed and Agreed Upon: yes  Patient Response to Education: Patient/Caregiver Verbalized Understanding of Information    Assessment:  PT Assessment Results: Decreased strength, Pain  Rehab  Prognosis: Good  Assessment Comment: Patient arrives with pain complaints at her Low back, bilateral knees and R foot. States her R foot pain is most troubling currently. Ms. Lira has a L TKA surgery scheduled in December of this year and would like to have these issued resolved prior to surgery.  Add'l,she reports likely meningioma that is being followed. She responded will to mobilizations of her R foot and ankle today. Reported no foot pain with walking at end of session. Will benefit from therapy to address various pain complaints and weakness to address pain and alterations in gait.    Clinical Presentation: Evolving with changing characteristics  Personal Factors: None    Plan:  Treatment/Interventions: Education/ Instruction, Gait training, Manual therapy, Neuromuscular re-education, Self care/ home management, Taping techniques, Therapeutic activities, Therapeutic exercises  PT Plan: Skilled PT  PT Frequency: 2 times per week  Duration: 6 weeks  Onset Date: 07/10/24  Certification Period Start Date: 07/30/24  Certification Period End Date: 10/27/24  Number of Treatments Authorized: 1/MN  Rehab Potential: Good  Plan of Care Agreement: Patient  Monitor response to mobs - add STM to L/S spine.     Goals: Set and discussed today  Active       PT Problem       PT Goal 1       Start:  07/30/24    Expected End:  10/27/24       1) Patient's KIARA score will improve 10% indicating return to PLOF with minimal back pain in 4-6 weeks.   2) Patient will perform appropriate HEP for hip strengthening for management of chronic condition in 4-6 weeks.   3) Patient will improve hip extension and abduction strength to 4+/5 to demonstrate increased proximal stability for prolonged waking in 4-6 weeks.          Patient Stated Goal 1       Start:  07/30/24    Expected End:  10/27/24       R foot ankle pain will resolve to allow previous walking distance and duration for return to usual daily chores and activities in 6 weeks.               Plan of care was developed with input and agreement by the patient    Treatments:  Manual Therapy  Manual Therapy Activity 1: Mulligan ankle MWM : 2 x 10  Manual Therapy Activity 2: Grade II mobs of metatarsals, calcaneous and Talocrural A/P mobs - grade II      Zohaib Carvalho, PT

## 2024-07-30 PROBLEM — M79.671 RIGHT FOOT PAIN: Status: ACTIVE | Noted: 2024-07-30

## 2024-07-30 PROBLEM — M25.561 KNEE PAIN, BILATERAL: Status: ACTIVE | Noted: 2024-07-30

## 2024-07-30 PROBLEM — M25.562 KNEE PAIN, BILATERAL: Status: ACTIVE | Noted: 2024-07-30

## 2024-07-31 ENCOUNTER — TREATMENT (OUTPATIENT)
Dept: PHYSICAL THERAPY | Facility: CLINIC | Age: 74
End: 2024-07-31
Payer: MEDICARE

## 2024-07-31 DIAGNOSIS — M54.16 LUMBAR RADICULITIS: ICD-10-CM

## 2024-07-31 PROCEDURE — 97140 MANUAL THERAPY 1/> REGIONS: CPT | Mod: GP | Performed by: PHYSICAL THERAPIST

## 2024-07-31 ASSESSMENT — PAIN - FUNCTIONAL ASSESSMENT: PAIN_FUNCTIONAL_ASSESSMENT: 0-10

## 2024-07-31 ASSESSMENT — PAIN SCALES - GENERAL: PAINLEVEL_OUTOF10: 2

## 2024-07-31 NOTE — PROGRESS NOTES
Physical Therapy Treatment    Patient Name: Roseanne Lira  MRN: [id number]  Today's Date: 7/31/2024  Time Calculation  Start Time: 1230  Stop Time: 1310  Time Calculation (min): 40 min  PT Therapeutic Procedures Time Entry  Manual Therapy Time Entry: 33,      Current Problem  1. Lumbar radiculitis  Follow Up In Physical Therapy            Insurance:  Payor: [employer] MEDICARE / Plan: [employer] MURRAY MEDICARE / Product Type: *No Product type* /   Number of Treatments Authorized: 2/MN  Certification Period Start Date: 07/30/24  Certification Period End Date: 10/27/24    Subjective : Foot feeling ~80% improved from initial session. States R knee pain is lateral when it occurs.  States she believes that she is walking better because of less foot pain and believes this may be helping her knee and back.   General  Reason for Referral: Thoracic and LBP, R lateral foot pain, R knee pain  Referred By: Nicolasa Mirza Pa-C  Past Medical History Relevant to Rehab: Remote C2-4 fusion, Remote spinal fusion due to scoliosis, Remote craniotomy, Osteoarthritis, PMR,HTN,Cancer,Fibromyalgia. (Possible reoccurance of meningioma - being followed.)    Performing HEP?: Yes  Performing her usual water exercise program.   Precautions  Precautions  Precautions Comment: See medical history/surgical history.  Pain  Pain Assessment: 0-10  0-10 (Numeric) Pain Score: 2  Pain Type: Acute pain, Chronic pain  Pain Location:  (LBP, R knee, R foot/ankle)    Objective     Treatments:    Therapeutic Exercise  Therapeutic Exercise Activity 1: SLR x 10 (review for HEP)  Therapeutic Exercise Activity 2: Glute set: 30 hold/ 30 rest x 3 (Review for HEP)       Manual Therapy  Manual Therapy Activity 1: STM/DTM of R distal ITB near insertion  Manual Therapy Activity 2: STM/DTM of patellar tendon/quad  Manual Therapy Activity 3: Mulligan MWM at R ankle  Manual Therapy Activity 4: Grade II mobs of metatarsals/proximal and distal, calcaneal mobs, Talocrurual mobs.      OP EDUCATION:   No exercise at this time - first to access response to manual therapy.     Assessment:  PT Assessment  Assessment Comment: Arrives today and reports 80% improved vs at eval. State she has no foot pain following manual today. Lateral knee tender at ITB insert and lateral hamstring . Would like to avoid any add'l exercise as she is concerned about flaring up tendons and joints if too much is added all at once. Good tolerance to session.    Plan:  OP PT Plan  Treatment/Interventions: Education/ Instruction, Gait training, Manual therapy, Neuromuscular re-education, Self care/ home management, Taping techniques, Therapeutic activities, Therapeutic exercises  PT Plan: Skilled PT  PT Frequency: 2 times per week  Duration: 6 weeks  Onset Date: 07/10/24  Certification Period Start Date: 07/30/24  Certification Period End Date: 10/27/24  Number of Treatments Authorized: 2/MN  Rehab Potential: Good  Plan of Care Agreement: Patient    Goals:  Active       PT Problem       PT Goal 1       Start:  07/30/24    Expected End:  10/27/24       1) Patient's KIARA score will improve 10% indicating return to PLOF with minimal back pain in 4-6 weeks.   2) Patient will perform appropriate HEP for hip strengthening for management of chronic condition in 4-6 weeks.   3) Patient will improve hip extension and abduction strength to 4+/5 to demonstrate increased proximal stability for prolonged waking in 4-6 weeks.          Patient Stated Goal 1       Start:  07/30/24    Expected End:  10/27/24       R foot ankle pain will resolve to allow previous walking distance and duration for return to usual daily chores and activities in 6 weeks.               Zohaib Carvalho, PT

## 2024-08-05 DIAGNOSIS — R60.0 LOCALIZED EDEMA: ICD-10-CM

## 2024-08-05 RX ORDER — FUROSEMIDE 20 MG/1
TABLET ORAL
Qty: 90 TABLET | Refills: 0 | Status: SHIPPED | OUTPATIENT
Start: 2024-08-05

## 2024-08-08 ENCOUNTER — OFFICE VISIT (OUTPATIENT)
Dept: NEUROLOGY | Facility: CLINIC | Age: 74
End: 2024-08-08
Payer: MEDICARE

## 2024-08-08 VITALS
HEART RATE: 67 BPM | SYSTOLIC BLOOD PRESSURE: 138 MMHG | DIASTOLIC BLOOD PRESSURE: 81 MMHG | BODY MASS INDEX: 32.35 KG/M2 | HEIGHT: 64 IN

## 2024-08-08 DIAGNOSIS — D32.9 MENINGIOMA (MULTI): ICD-10-CM

## 2024-08-08 DIAGNOSIS — R41.3 COMPLAINTS OF MEMORY DISTURBANCE: ICD-10-CM

## 2024-08-08 DIAGNOSIS — R51.9 UNILATERAL HEADACHE: Primary | ICD-10-CM

## 2024-08-08 DIAGNOSIS — I95.1 ORTHOSTATIC HYPOTENSION: ICD-10-CM

## 2024-08-08 PROCEDURE — 99214 OFFICE O/P EST MOD 30 MIN: CPT | Performed by: PSYCHIATRY & NEUROLOGY

## 2024-08-08 PROCEDURE — 3079F DIAST BP 80-89 MM HG: CPT | Performed by: PSYCHIATRY & NEUROLOGY

## 2024-08-08 PROCEDURE — 3075F SYST BP GE 130 - 139MM HG: CPT | Performed by: PSYCHIATRY & NEUROLOGY

## 2024-08-08 PROCEDURE — 1036F TOBACCO NON-USER: CPT | Performed by: PSYCHIATRY & NEUROLOGY

## 2024-08-08 PROCEDURE — 1159F MED LIST DOCD IN RCRD: CPT | Performed by: PSYCHIATRY & NEUROLOGY

## 2024-08-08 PROCEDURE — 1160F RVW MEDS BY RX/DR IN RCRD: CPT | Performed by: PSYCHIATRY & NEUROLOGY

## 2024-08-08 NOTE — PROGRESS NOTES
Subjective     Roseanne Lira is a 73 y.o. year old female seen in follow-up for orthostatic hypotension, history of cervical fusion for chordoma, suboccipital craniectomy for left CP angle meningioma, right paracentral convexity meningioma.    HPI    73-year-old right-handed woman with past medical history significant for right breast cancer status post partial mastectomy in 2011 followed by radiation, asthma, fibromyalgia, seasonal affective disorder, and a complicated neurosurgical history. 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 She underwent suboccipital craniotomy in 2001 for a small left CP angle meningioma. She has a roughly 1 cm right parasagittal convexity meningioma, until recently followed conservatively.      In December 2016 she underwent gamma knife radiosurgery for the right parasagittal meningioma. At a visit on 6/16/17 we talked about 2 episodes of left distal lower extremity dysfunction, the first on 5/18 and the second on 6/2. The second episode occurred while she was in [place] and she just got back in to the [place] area. These were detailed in telephone notes. I discussed the option of an EMG and she preferred to hold off on it. I suspected that it might be a peroneal neuropathy at the knee from leg crossing.     At a visit on 5/7/18 she voiced that her lower extremity complaints had resolved, but she was increasingly bothered by pain in the hands particularly on the right, spreading up into the volar forearms. I suspected carpal tunnel syndrome but there was only extremely mild evidence of such bilaterally on EMG.     She subsequently saw Dr. Cleveland, hand surgery, who did a carpal tunnel injection on the right which relieved her hand pain for about 9 weeks. Then the pain recurred but subsequently resolved after some vigorous shaking of the hands.     I evaluated her again on 7/7/2020 by A/V visit. We discussed positional dizziness which had  been associated with a 38 point drop in systolic pressure from supine to standing, and I reviewed measures for management of orthostatic hypotension. She described an episode of visual disturbance and I was concerned that we should exclude vertebrobasilar insufficiency. I sent her at that time for MRA of the head and neck with finding of right vertebral artery occlusion but patent basilar artery.     I evaluated her again on 5/2/2022, returning to the office after a long interval. I sent her after that visit for a brain MRI for meningioma stability and that showed stable findings compared to the prior study from 2019. She continued to note mild orthostatic symptoms although bedside vital signs were unrevealing. I reviewed conservative measures.    I evaluated her most recently on 8/10/2023 at which time she was neurologically stable.    She is evaluated again today in the office.    She endorses new focal left temporal headaches which have an aching character, achieving only 4/10 intensity, just over the past 2 months or so.  They are occurring on average 3-4 days/week without any particular time of day.  Duration is less than an hour.  She denies scalp tenderness or jaw claudication symptoms.  Recent labs from 7/9 showed normal ESR of 22 and slightly elevated C-reactive protein of 1.6 mg/dL.    She indicates that her vision OD seems different than baseline in a vague way.  She has a pending appointment with Dr. Leal, neuro-ophthalmology, for September.  She has a history of acephalgic migraine visual auras over roughly the past 2 years and recently had a binocular aura that was somewhat different than her usual ones, without the typical geometric shapes and colors she normally experiences.  It did last 10-15 minutes and subsequently resolved.  She denies diplopia.    She continues to note occasional postural lightheadedness but it is generally minimal as long as she is staying well-hydrated and eating adequate  "salt in her diet.  She denies any episodes of near syncope.  She denies falls.    She has arthritic knees and lately has been mostly bothered by right knee pain despite history of right TKA about 18 months ago.  She is pending left knee surgery in December.  She uses Voltaren topical as well as CBD topical and hot compresses.  She is doing physical therapy and she also does pool exercise at [facility].  She is considering a trial of acupuncture which has worked well for other problems over the years.  She has a referral already for acupuncture.  Because of her bad knees it is difficult for her to do gardening but she recently obtained a raised garden bed which allows her to work without bending at the knees.    She also gets low back pain which is exacerbated by bending over, and this does further limit her activities.    She endorses minor \"short-term\" memory issues, and is more concerned about her  who has more significant cognitive dysfunction.  It does not sound as if he has seen a neurologist yet.       Review of Systems    As per the history of present illness    Patient Active Problem List   Diagnosis    Alopecia areata    Angioma    Dyslipidemia    Dyspepsia    Gastro-esophageal reflux disease without esophagitis    H/O meningioma of the brain    Hemangioma of skin and subcutaneous tissue    Hypoestrogenism    Infantile idiopathic scoliosis of thoracolumbar region    Intermittent asthma (HHS-HCC)    Labia minora agglutination    Labile hypertension    Left leg weakness    Lichen sclerosus    Lipoma of back    Meningioma (Multi)    Muscle spasm    Myopia with astigmatism and presbyopia    Melanocytic nevi of trunk    Numerous moles    Osteoarthritis of left knee    Osteopenia    Other melanin hyperpigmentation    Other seborrheic keratosis    Paresthesia    Fibromyalgia    PMR (polymyalgia rheumatica) (Multi)    Tenderness of left calf    Postural dizziness    Primary localized osteoarthrosis of right " lower leg    Rash and other nonspecific skin eruption    Reactive airway disease (HHS-HCC)    Seborrhea capitis    SOB (shortness of breath) on exertion    Kidney stone    Urolithiasis    Vitamin D deficiency    Vulvar pruritus    Weakness of both upper extremities    Age related osteoporosis    Allergic contact dermatitis due to cosmetics    Ankle edema    Asymptomatic varicose veins    Autonomic dysfunction    Bilateral carpal tunnel syndrome    Borderline hyperglycemia    Claustrophobia    Abnormal carotid ultrasound    Abnormal CT scan, lung    Acquired scoliosis    Acquired vaginal adhesions    Intertrigo    Lentigines    Visit for screening mammogram    Ductal carcinoma in situ (DCIS) of right breast    Chronic bilateral low back pain without sciatica    Wellness examination    Bilateral hip pain    Depression, recurrent (CMS-HCC)    Aneurysm of vertebral artery (CMS-HCC)    CKD stage 3a, GFR 45-59 ml/min (Multi)    Lumbar radiculitis    Vision changes    Right foot pain    Knee pain, bilateral     Past Medical History:   Diagnosis Date    Age-related nuclear cataract, left eye 03/25/2017    Age-related nuclear cataract of left eye    Age-related nuclear cataract, right eye 03/25/2017    Age-related nuclear cataract of right eye    Allergy status to unspecified drugs, medicaments and biological substances     History of seasonal allergies    Cortical age-related cataract, left eye 03/25/2017    Cortical age-related cataract of left eye    Cortical age-related cataract, right eye 03/25/2017    Cortical age-related cataract of right eye    Diarrhea, unspecified 09/25/2018    Acute diarrhea    Dizziness and giddiness 06/10/2020    Postural dizziness with presyncope    Elevated blood-pressure reading, without diagnosis of hypertension 10/16/2019    Prehypertension    Encounter for general adult medical examination without abnormal findings 09/14/2020    Preventative health care    Encounter for other preprocedural  examination     Preoperative exam for gynecologic surgery    Encounter for screening mammogram for malignant neoplasm of breast 06/03/2013    Visit for screening mammogram    Epigastric pain 01/24/2020    Abdominal pain, acute, epigastric    Headache, unspecified 10/19/2020    Sinus headache    Intraductal carcinoma in situ of unspecified breast 04/01/2015    DCIS (ductal carcinoma in situ) of breast    Leukoplakia of vulva     Lichen sclerosus of female genitalia    Long term (current) use of systemic steroids 08/27/2019    Current chronic use of systemic steroids    Other chest pain 10/16/2019    Atypical chest pain    Other conditions influencing health status 10/25/2019    Chronic use of steroids    Other disturbances of skin sensation 05/19/2015    Burning sensation    Other malaise 08/23/2018    Malaise and fatigue    Other pericardial effusion (noninflammatory) ([facility]) 04/07/2015    Pericardial effusion    Personal history of malignant neoplasm of bone 07/11/2019    History of malignant neoplasm of bone    Personal history of other diseases of the respiratory system 04/01/2015    History of asthma    Personal history of other diseases of the respiratory system 04/05/2017    History of acute bronchitis    Personal history of other diseases of the respiratory system 01/03/2014    History of upper respiratory infection    Personal history of other specified conditions 03/30/2015    History of abdominal pain    Personal history of urinary (tract) infections 06/23/2015    History of urinary tract infection    Personal history of urinary (tract) infections 04/05/2017    History of acute cystitis    Unspecified visual disturbance 07/07/2020    Visual disturbances    Urinary tract infection, site not specified 05/13/2021    Acute UTI     Past Surgical History:   Procedure Laterality Date    BREAST LUMPECTOMY Right     with radiation in 2011    BREAST SURGERY  06/03/2013    Breast Surgery    COLONOSCOPY  06/03/2013     Complete Colonoscopy    CT ANGIO NECK  12/09/2022    CT NECK ANGIO W AND WO IV CONTRAST 12/9/2022 [facility] ANCILLARY LEGACY    CT HEAD ANGIO W AND WO IV CONTRAST  12/09/2022    CT HEAD ANGIO W AND WO IV CONTRAST 12/9/2022 [facility]CILLARY [facility]GAC[facility]    MR HEAD ANGIO WO IV CONTRAST  07/27/2020    MR HEAD ANGIO WO IV CONTRAST 7/27/2020 [facility]    MR HEAD ANGIO WO IV CONTRAST  05/18/2017    MR HEAD ANGIO WO IV CONTRAST 5/18/2017 [facility] EMERGENCY LEGACY    MR NECK ANGIO WO IV CONTRAST  07/27/2020    MR NECK ANGIO WO IV CONTRAST 7/27/2020 [facility] ANCILLARY LEGACY    MR NECK ANGIO WO IV CONTRAST  05/18/2017    MR NECK ANGIO WO IV CONTRAST 5/18/2017 [facility] [place] [facility]    OTHER SURGICAL HISTORY  06/03/2013    Craniotomy Supratentorial Excision Of Meningioma    OTHER SURGICAL HISTORY  10/19/2020    Cataract surgery    OTHER SURGICAL HISTORY  03/13/2017    Arthrodesis Cervical    OTHER SURGICAL HISTORY  03/13/2017    Craniotomy Infratentorial Excision Of Meningioma    SPINE SURGERY  06/03/2013    Spine Repair    TUBAL LIGATION  05/19/2015    Tubal Ligation     Social History     Tobacco Use    Smoking status: Never    Smokeless tobacco: Never   Substance Use Topics    Alcohol use: Never     family history includes Alzheimer's disease in her mother's sister; Breast cancer (age of onset: 40) in her mother's sister; Dementia in her mother; Heart attack in her maternal grandfather; Hypertension in her maternal grandmother and mother; Kidney disease in her maternal grandmother.    Current Outpatient Medications:     albuterol 90 mcg/actuation inhaler, Inhale 2 puffs once daily as needed for wheezing. Q6-8hrs prn sob, Disp: 18 g, Rfl: 0    amLODIPine (Norvasc) 2.5 mg tablet, Take 1 tablet (2.5 mg) by mouth 2 times a day., Disp: 180 tablet, Rfl: 1    calcium carbonate-vitamin D3 600 mg-20 mcg (800 unit) tablet, Take 1 tablet by mouth once daily., Disp: , Rfl:     cetirizine (ZyrTEC) 10 mg tablet, Take 1 tablet (10 mg) by mouth once  daily., Disp: , Rfl:     cholecalciferol (Vitamin D-3) 5,000 Units tablet, Take 1 tablet (5,000 Units) by mouth early in the morning.., Disp: , Rfl:     clobetasol (Temovate) 0.05 % cream, 1 Application, Disp: , Rfl:     clotrimazole-betamethasone (Lotrisone) cream, 1 Application, Disp: 1 g, Rfl: 3    cyanocobalamin (Vitamin B-12) 100 mcg tablet, Take 1 tablet (100 mcg) by mouth once daily., Disp: , Rfl:     famotidine (Pepcid) 40 mg tablet, Take 1 tablet (40 mg) by mouth once daily at bedtime., Disp: 90 tablet, Rfl: 1    fluticasone (Flonase) 50 mcg/actuation nasal spray, Administer 1 spray into affected nostril(s) once daily as needed., Disp: , Rfl:     furosemide (Lasix) 20 mg tablet, TAKE 1 TABLET BY MOUTH EVERY DAY AS NEEDED FOR EDEMA, Disp: 90 tablet, Rfl: 0    LORazepam (Ativan) 0.5 mg tablet, Take 1 tablet (0.5 mg) by mouth once daily as needed., Disp: , Rfl:     lovastatin (Mevacor) 20 mg tablet, Take 1 tablet (20 mg) by mouth once daily at bedtime., Disp: 90 tablet, Rfl: 1    MAGNESIUM GLUCONATE ORAL, Take 500 mg by mouth once daily., Disp: , Rfl:     venlafaxine XR (Effexor-XR) 150 mg 24 hr capsule, Take 1 capsule (150 mg) by mouth once daily., Disp: , Rfl:     venlafaxine XR (Effexor-XR) 37.5 mg 24 hr capsule, TAKE ONE ALONG WITH 150 MG DAILY, Disp: , Rfl:     vitamin E mixed 400 unit capsule, Take 1 capsule by mouth once daily., Disp: , Rfl:   Allergies   Allergen Reactions    Codeine Nausea Only and Nausea/vomiting    Beclomethasone Unknown    Omeprazole Dermatitis    Hydrocortisone Unknown    Penicillins Itching       Objective   Neurological Exam  Physical Exam    Physical Examination:    General: Alert woman who was ambulatory without assistive devices.      Cardiovascular: No temporal tenderness to palpation.    Mental Status: Clear sensorium without fluctuation.  Appropriate in conversation.  Fluent unremarkable speech without paraphasic errors or hesitancy.  Oriented to self, date and day of the  week, suburb, street and floor.  Registered 3/3 and recalled 2/3 after distraction without cueing, improving rapidly of 3 with category cue.  3/5 serial sevens before making a frameshift error.  She name the current and first president and the current .    Cranial Nerves:  Funduscopic exam was not well visualized bilaterally on nondilated exam.  Pupils were equal, round and reactive to light with no relative afferent pupillary defect.  Extraocular movements were intact and conjugate without nystagmus.  No ptosis.  Visual fields were full to confrontation tested binocularly.  Facial sensation was asymmetric to pin over V1, slightly sharper right, and otherwise symmetric.  Facial motor function was symmetrically intact.  Hearing was grossly intact.  No dysarthria.  Shoulder shrug was symmetric.  Tongue protrusion was midline.    Motor: Muscle bulk and tone were normal throughout. There was no pronator drift or asymmetry of finger taps. Confrontation strength was symmetrically 5/5 throughout the upper and lower extremities.     Coordination: No postural or rest tremor, myoclonus or dystonic posturing.    Sensation: Romberg sign was absent.     Station: Intact and stable.    Gait: Stable and unremarkable.  Intact tandem.      Assessment/Plan     We discussed her new left temporal headaches.  Recent ESR was normal and CRP only marginally elevated.  She does have an upcoming neuro-ophthalmology appointment, also with regard to atypical visual aura, and I encouraged her to keep this appointment.    Given her new headaches she expressed concern about repeating the brain MRI.  Her last MRI was done in 2022.  I suggested repeating it and I will request this without gadolinium given that her GFR was a bit off on the last assessment 1 month ago.    I will contact her with MRI results.    Orthostatic symptoms are quite minimal as long as she maintains good hydration and adds a bit of salt to her diet.    I advised  her to proceed with acupuncture for her right knee pain as it has helped significantly with other problems over the years.    I reassured her that her cognitive performance today is within the realm of normal brain aging and I do not suspect dementia or even MCI.    I advised her to follow-up in the office in 1 year.

## 2024-08-08 NOTE — PATIENT INSTRUCTIONS
We discussed repeating the brain MRI as it has been a little over 2 years, and given your new frequent left temporal headaches.    I will contact you with MRI results.    Be sure to keep your upcoming appointment with ophthalmology regarding the change in appearance of your visual auras.    Continue your regimen of hydration and salt supplementation for orthostatic symptoms.    I recommend going ahead with acupuncture for your chronic right knee pain.    Please see me in the office in 1 year.

## 2024-08-09 ENCOUNTER — APPOINTMENT (OUTPATIENT)
Dept: PHYSICAL THERAPY | Facility: CLINIC | Age: 74
End: 2024-08-09
Payer: MEDICARE

## 2024-08-12 ENCOUNTER — APPOINTMENT (OUTPATIENT)
Dept: PHYSICAL THERAPY | Facility: CLINIC | Age: 74
End: 2024-08-12
Payer: MEDICARE

## 2024-08-13 ENCOUNTER — TELEPHONE (OUTPATIENT)
Dept: RADIATION ONCOLOGY | Facility: HOSPITAL | Age: 74
End: 2024-08-13
Payer: MEDICARE

## 2024-08-14 ENCOUNTER — HOSPITAL ENCOUNTER (OUTPATIENT)
Dept: RADIATION ONCOLOGY | Facility: HOSPITAL | Age: 74
Setting detail: RADIATION/ONCOLOGY SERIES
Discharge: HOME | End: 2024-08-14
Payer: MEDICARE

## 2024-08-14 VITALS
BODY MASS INDEX: 32.51 KG/M2 | RESPIRATION RATE: 18 BRPM | DIASTOLIC BLOOD PRESSURE: 77 MMHG | HEART RATE: 72 BPM | TEMPERATURE: 97.7 F | OXYGEN SATURATION: 96 % | SYSTOLIC BLOOD PRESSURE: 128 MMHG | WEIGHT: 189.38 LBS

## 2024-08-14 DIAGNOSIS — C50.911 MALIGNANT NEOPLASM OF RIGHT FEMALE BREAST, UNSPECIFIED ESTROGEN RECEPTOR STATUS, UNSPECIFIED SITE OF BREAST (MULTI): ICD-10-CM

## 2024-08-14 DIAGNOSIS — C54.1 ENDOMETRIAL ADENOCARCINOMA (MULTI): Primary | ICD-10-CM

## 2024-08-14 PROCEDURE — 99214 OFFICE O/P EST MOD 30 MIN: CPT | Performed by: NURSE PRACTITIONER

## 2024-08-14 ASSESSMENT — ENCOUNTER SYMPTOMS
RESPIRATORY NEGATIVE: 1
BLOOD IN STOOL: 0
CHILLS: 0
FEVER: 0
CARDIOVASCULAR NEGATIVE: 1
DEPRESSION: 0
OCCASIONAL FEELINGS OF UNSTEADINESS: 0
HEMATURIA: 0
DYSURIA: 0
ANAL BLEEDING: 0
NAUSEA: 0
DIFFICULTY URINATING: 0
PSYCHIATRIC NEGATIVE: 1
HEMATOLOGIC/LYMPHATIC NEGATIVE: 1
CONSTIPATION: 0
LOSS OF SENSATION IN FEET: 0
UNEXPECTED WEIGHT CHANGE: 0
FREQUENCY: 0
FATIGUE: 0
ARTHRALGIAS: 1
NEUROLOGICAL NEGATIVE: 1
ABDOMINAL DISTENTION: 0
RECTAL PAIN: 0
ABDOMINAL PAIN: 0
VOMITING: 0
DIAPHORESIS: 0
BACK PAIN: 1
DIARRHEA: 0
APPETITE CHANGE: 0
ACTIVITY CHANGE: 0

## 2024-08-14 ASSESSMENT — PAIN SCALES - GENERAL: PAINLEVEL: 0-NO PAIN

## 2024-08-14 NOTE — PROGRESS NOTES
Patient ID: [id number]     Diagnosis: 8/18/23: 74 year old female with FIGO  Stage IB (pT1b, pN0, cM0) G1 endometrial adenocarcinoma of the uterus, p53 wt, MMRd, extensive LVSI with negative sentinel node involvement      Cancer History:   7/25/23: D&C showed endometrial adenocarcinoma, endometriod type  8/18/23: NISH and BSO, sLND with uterosacral ligament suspension; showed FIGO G1 with 75% myometrial invasion 0/2LNs +  9/7/23: Appt with Dr. Magallon; adding to gyn TB     Dr. Magallon had referred the patient for evaluation and treatment for radiation.     HDR to the vaginal cuff completed on 10/11/23.    History of presenting illness    Roseanne Lira is a 74 y.o. female who presents today for follow up 10 months s/p HDR to the vaginal cuff. Patient is doing well. Energy is baseline. Appetite is good. Weight stable. Denies pelvic pain, vaginal bleeding or discharge. She had surgery and a sling placed for prolapse. She said leakage as rare. Bowels baseline. Denies rectal bleeding. No longer using vaginal dilator. Endorses chronic knee and back pain. She had surgery on one knee 18 months ago and is scheduled for the other knee in December. Asking about seeing genetics. She has history of breat and endometrial cancer. There is also a strong family history. She is worried about her granddaughters.  Patient is not sexually active. Follow up with Dr. Pack in October.      Review of systems:  Review of Systems   Constitutional:  Negative for activity change, appetite change, chills, diaphoresis, fatigue, fever and unexpected weight change.   HENT: Negative.     Respiratory: Negative.     Cardiovascular: Negative.    Gastrointestinal:  Negative for abdominal distention, abdominal pain, anal bleeding, blood in stool, constipation, diarrhea, nausea, rectal pain and vomiting.   Genitourinary:  Negative for decreased urine volume, difficulty urinating, dysuria, enuresis, frequency, hematuria, pelvic pain, urgency, vaginal  bleeding, vaginal discharge and vaginal pain.   Musculoskeletal:  Positive for arthralgias and back pain.   Skin: Negative.    Neurological: Negative.    Hematological: Negative.    Psychiatric/Behavioral: Negative.         Past Medical history  She  has a past surgical history that includes Spine surgery (06/03/2013); Other surgical history (06/03/2013); Breast surgery (06/03/2013); Colonoscopy (06/03/2013); Other surgical history (10/19/2020); Tubal ligation (05/19/2015); Other surgical history (03/13/2017); Other surgical history (03/13/2017); MR angio head wo IV contrast (07/27/2020); MR angio neck wo IV contrast (07/27/2020); MR angio head wo IV contrast (05/18/2017); MR angio neck wo IV contrast (05/18/2017); CT angio neck (12/09/2022); CT angio head w and wo IV contrast (12/09/2022); and Breast lumpectomy (Right).        Last recorded vital:  /77   Pulse 72   Temp 36.5 °C (97.7 °F) (Skin)   Resp 18   Wt 85.9 kg (189 lb 6 oz)   SpO2 96%   BMI 32.51 kg/m²     Physical exam  Physical Exam  Constitutional:       General: She is not in acute distress.     Appearance: Normal appearance. She is not ill-appearing, toxic-appearing or diaphoretic.   HENT:      Head: Normocephalic.   Cardiovascular:      Rate and Rhythm: Normal rate and regular rhythm.      Pulses: Normal pulses.      Heart sounds: Normal heart sounds.   Pulmonary:      Effort: Pulmonary effort is normal.      Breath sounds: Normal breath sounds.   Abdominal:      General: Bowel sounds are normal. There is no distension.      Palpations: Abdomen is soft. There is no mass.      Tenderness: There is no abdominal tenderness. There is no guarding or rebound.      Hernia: No hernia is present.   Genitourinary:     General: Normal vulva.      Pubic Area: No rash.       Labia:         Right: No rash, tenderness, lesion or injury.         Left: No rash, tenderness, lesion or injury.       Urethra: No prolapse, urethral pain, urethral swelling or  urethral lesion.      Vagina: No vaginal discharge.      Comments: Vulvar Lichens Sclerosis.  Urethral meatus normal. Vagina normal.  Uterus and cervix surgically absent.  No evidence of masses in pelvis. No pain with palpation.  No vaginal bleeding or abnormal discharge.  Musculoskeletal:      Cervical back: Normal range of motion and neck supple.   Skin:     General: Skin is warm and dry.   Neurological:      General: No focal deficit present.      Mental Status: She is alert and oriented to person, place, and time.   Psychiatric:         Mood and Affect: Mood normal.         Behavior: Behavior normal.         Thought Content: Thought content normal.         Judgment: Judgment normal.         Plan:  Assessment/Plan     74 year old female 10 months s/p HDR to the vaginal cuff. Patient is doing well with no acute complaints related to treatment. BINH on exam. Patient no longer using dilators. Referral for genetics placed.  Continue follow up with Gyn as scheduled. Patient to return to clinic in 6 months unless needs to be seen sooner. Instructed to call with questions or concerns.

## 2024-08-15 NOTE — ADDENDUM NOTE
Encounter addended by: Nicole Wakefield, GUERLINE-ANGIE on: 8/15/2024 8:32 AM   Actions taken: Order list changed

## 2024-08-20 ENCOUNTER — HOSPITAL ENCOUNTER (OUTPATIENT)
Dept: RADIOLOGY | Facility: CLINIC | Age: 74
Discharge: HOME | End: 2024-08-20
Payer: MEDICARE

## 2024-08-20 DIAGNOSIS — R51.9 UNILATERAL HEADACHE: ICD-10-CM

## 2024-08-20 DIAGNOSIS — D32.9 MENINGIOMA (MULTI): ICD-10-CM

## 2024-08-20 PROCEDURE — 70551 MRI BRAIN STEM W/O DYE: CPT

## 2024-08-20 PROCEDURE — 70551 MRI BRAIN STEM W/O DYE: CPT | Performed by: RADIOLOGY

## 2024-08-26 ENCOUNTER — TELEPHONE (OUTPATIENT)
Dept: NEUROLOGY | Facility: CLINIC | Age: 74
End: 2024-08-26
Payer: MEDICARE

## 2024-08-26 NOTE — TELEPHONE ENCOUNTER
----- Message from Michael Robledo sent at 8/26/2024 12:43 PM EDT -----  Please inform her that her brain MRI is stable.  
Spoke with patient   
6

## 2024-08-27 ENCOUNTER — APPOINTMENT (OUTPATIENT)
Dept: PHYSICAL THERAPY | Facility: CLINIC | Age: 74
End: 2024-08-27
Payer: MEDICARE

## 2024-08-28 NOTE — PROGRESS NOTES
Counseling intern, Poppy Joy, was present and participated in part of this appointment    History of Present Illness:  Roseanne Lira is a 74 y.o. female with a personal history of cancer.  Roseanne Lira was referred to the Cancer Genetics Clinic at [facility] by Nicole Wakefield APRN*. Roseanne Lira is interested in genetic testing to clarify their personal risk for cancer, as well as the risks to their family members.    Cancer Medical History:  Personal history of cancer? Yes   Type: Uterine  Age at diagnosis: 73  Summary: Ms. Lira was diagnosed with FIGO  Stage IB (pT1b, pN0, cM0) G1 endometrial adenocarcinoma of the uterus, p53 wt, extensive LVSI with negative sentinel node involvement on 8/18/23. There was loss of MLH-1/PMS-2 mismatch repair protein expression with positive MLH1 hypermethylation (which makes Duran Syndrome unlikely). She is s/p NISH/BSO.    History of other cancers: Yes, h/o breast cancer diagnosed in 2011 (age 61) status post partial mastectomy followed by radiation. She also has a h/o meningioma and is s/p craniotomy in 2001 for a small left CP angle meningioma as well as s/p gamma knife radiosurgery for the right parasagittal meningioma in 12/16. She reports a 3rd meningioma as well ~2 years ago. 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    Prior genetic testing? Yes, per patient she had some form of prenatal genetic testing relating to the possibility (no records available).    Cancer screening history:  Mammogram?  Follows annually with last 2023  Colonoscopy? Yes  - 2/18/20: 1 cecal polyp  - 5/13/22: 1 descending colon polyp (hyperplastic)  Upper endoscopy? Yes, most recent 2/18/20 (2 fundic gland polyps and mild gastritis)    Dermatology? Yes, most recent 4 mo ago    Other cancer screening? None    Other potential exposures:  Reports no THELMA exposure to her knowledge    Family history:  A 4-generation  pedigree was obtained and was significant for the following:   No daughters  No full siblings  Three paternal half-brothers and two paternal half-sister whom we have no information on  Mother (, 92) without cancer history  One maternal aunt (, mid 80s) with a h/o breast cancer in her 40s (no genetic testing). She had three daughters, one of which had breast cancer in her 50s (h/o THELMA exposure in utero) and another has a daughter with a h/o uterine cancer at 34 (patient believes she had genetic testing which was BRCA negative , but we have no records)  Maternal grandmother (, 86) without cancer history  Maternal grandfather (, 72) with a h/o kidney cancer at an age >50  Father (, 70s) without cancer history  One paternal aunt whom we have no information on  No information regarding her paternal grandparents  There is Ashkenazi Religious ancestry on both sides. Consanguinity was denied.       Discussion:  Roseanne Lira is a 74 y.o. old female with a personal and family history of cancer.  Based on her personal history of breast cancer at any age with Ashkenazi Religious ancestry, Roseanne Lira meets NCCN criteria for testing of the BRCA1 and BRCA2 genes. She is interested in testing, which is recommended, and was ordered today via the 71-gene CancerNext-Expanded + HCI panel from [employer]. Our discussion is summarized below.    We reviewed genes and chromosomes, inherited forms of breast and ovarian cancer, and the BRCA1 and BRCA2 genes causing HBOC. We discussed that most cancers are not due to an inherited genetic susceptibility. However, in about 5-10% of families, there is an inherited genetic mutation that can make a person more susceptible to developing certain forms of cancer. Within these families, we often see multiple family members with cancer, occurring in multiple generations. In addition, earlier onset and bilateral cancers are suggestive of an inherited form  of cancer. Finally, there is a clustering of certain types of cancer in these families, such as breast and ovarian cancer.     We discussed the BRCA1 and BRCA2 genes, which are two genes that have been linked to early-onset breast and/or ovarian cancer. Changes in these genes (sometimes referred to as mutations) are inherited in a dominant pattern and confer >60% lifetime risk for breast cancer. This is elevated compared to the general population risk of 13%. In addition, BRCA1 and BRCA2 mutation carriers have up to a 58% lifetime risk for ovarian cancer, which is elevated over the 1.3% general population risk. Mutation carriers who have already been diagnosed with cancer have an increased risk to develop a second, contralateral breast cancer. BRCA2 gene mutation carriers have an increased risk for male breast cancer, prostate cancer, melanoma and pancreatic cancer.    We also briefly discussed Duran syndrome given her personal and family history of uterine cancer. Duran Syndrome is caused by germline mutations in one of five genes - MLH1, MSH2, MSH6, PMS2, and EPCAM. Individuals with Duran syndrome have increased risk to develop colon cancer over their lifetime, and an increased risk for a second colon primary. Women with Duran syndrome have an increased risk for endometrial cancer and ovarian cancer. Other cancers associated with Duran syndrome include gastric cancer, hepatobiliary, small bowel, urinary tract, and rarely pancreatic cancer. Understanding if an individual has this condition results in changes to their medical management such as more frequent colonoscopies.    Ms. Lira already had a screening test for Duran syndrome, which came back risk-reducing for Duran Syndrome. This screening involves looking to see if the Duran syndrome proteins were expressed in the cancer or not. Two proteins, MLH1 and PMS2, were not expressed; however there was proven MLH-1 promoter hypermethylation, which makes Renee  Syndrome unlikely. This reduces, but does not eliminate, the chance that Ms. Lira has Duran syndrome. That screening has a 5-10% false negative rate.    Lastly, we briefly discussed her history of meningioma. We discussed that there are some conditions that can cause meningioma (i.e. Li Fraumeni, PTEN, and NF2); however, these conditions are not very likely given a lack of other suggestive features for these conditions, lack of other suggestive family history, and/or her ages of diagnoses.    We discussed that there are multiple genes associated with increased cancer risk. Some genes, like the BRCA1 and BRCA2 genes, are considered highly penetrant breast cancer genes, meaning a mutation in the gene confers a high risk of breast cancer. Additionally, there are other intermediate (moderate risk) breast cancer genes. For some of the moderate risk genes, there is often limited information regarding the degree to which a mutation in the gene affects risk of different types of cancers. Additionally, for some of these moderate risk genes, the appropriate management for individuals who have a mutation in one of these genes is not always clear. Our knowledge about the cancer risks associated with mutations in these moderate risk genes is always growing, and we will likely be able to provide more comprehensive information in the future.     Gene mutations in most cancer risk genes, i.e. BRCA1 and BRCA2, are inherited in an autosomal dominant fashion. This means that if an individual has a change in either of these genes, their siblings, parents, and children have a 50% chance of also having that gene change and a 50% chance of not having the gene change.     We reviewed the three results we can get back:  1. Positive- Identified a change in a cancer gene that confers an increased cancer risk. We will discuss potential changes in management for her and her family based on the specific gene mutation found.  2. Negative-Clears  her for the cancer predisposition syndrome we assessed, but cannot clear her for all cancer predisposition risks. She would continue to be screened based on her personal and family history.  3. Variant of Uncertain Significance (VUS) - We discussed should an uncertain result come back that this would be treated like a negative result (i.e. no management recommendation will be made no familial variant testing) as the implications of this finding are currently unknown.    Lastly, we discussed the Genetic Information Non-discrimination Act (EDEN) of 2008. We discussed that per this federal law, employers (at companies with 15 employees or greater) and health insurance companies (barring [employer] and other  insurances) are forbidden to ask for and use genetic information against another person. As such, health insurance companies cannot ask for genetic information and use findings affect coverage or rates. However, luxury insurances such as life insurance, long term care insurance, and/or private disability insurance companies are not forbidden against using genetic information when an individual takes out a new/additional policy in one of those areas. As such, for unaffected individuals it could be beneficial to explore/take out policies in luxury insurance areas PRIOR to undergoing genetic testing.    Roseanne Lira was counseled about hereditary cancer susceptibility including cancer risks, options for increased screening and/or risk reduction, genetic testing, and the implications for other family members. We discussed performing genetic testing in the context of a multi-gene panel test that looks at the BRCA1 and BRCA2, as well as moderate penetrant genes. She is interested in this approach, which is recommended and was ordered today via the 71-gene CancerNext-Expanded + Zuki panel from [facility].    Results are typically available within 4 weeks, and Rosaenne Lira will return to the Cancer Genetics  Clinic to discuss her testing results. At that time, we will make recommendations for both Roseanne Lira and her family members in terms of cancer screening and/or cancer risk reduction options.         PLAN:  1.  Roseanne Lira elected to undergo genetic testing via a panel test that analyzes 71 genes associated with breast and other cancer or tumor risks. Consent for testing was verbalized and plan made for a blood draw using a DNA/RNA kit that will be mailed to Ms. Lira's house to be taken to a  lab. The sample will be sent to [facility] for analysis. Results are typically available in 4 weeks.    2. Roseanne Lira will return to the Cancer Genetics Clinic in approximately 4  weeks to discuss her test results.     3. We remain available to Roseanne Lira or her family members at [phone] if any questions arise regarding information discussed at today's visit.    Judy Bangura MS, [facility] [place]  Certified Genetic Counselor  [facility]  [phone]    Reviewed by:  Dr. Natalia Gary  Clinical   [facility]  [phone]

## 2024-08-29 ENCOUNTER — TREATMENT (OUTPATIENT)
Dept: PHYSICAL THERAPY | Facility: CLINIC | Age: 74
End: 2024-08-29
Payer: MEDICARE

## 2024-08-29 DIAGNOSIS — M54.16 LUMBAR RADICULITIS: ICD-10-CM

## 2024-08-29 PROCEDURE — 97140 MANUAL THERAPY 1/> REGIONS: CPT | Mod: GP | Performed by: PHYSICAL THERAPIST

## 2024-08-29 ASSESSMENT — PAIN SCALES - GENERAL: PAINLEVEL_OUTOF10: 4

## 2024-08-29 ASSESSMENT — PAIN - FUNCTIONAL ASSESSMENT: PAIN_FUNCTIONAL_ASSESSMENT: CPOT (CRITICAL CARE PAIN OBSERVATION TOOL)

## 2024-08-29 NOTE — PROGRESS NOTES
"Physical Therapy Treatment    Patient Name: Roseanne Lira  MRN: [id number]  Today's Date: 8/29/2024    Current Problem  Problem List Items Addressed This Visit             ICD-10-CM    Lumbar radiculitis M54.16       Insurance:  Payor: [employer] / Plan: [employer] MURRAY MEDICARE / Product Type: *No Product type* /   Number of Treatments Authorized: 3/MN  Certification Period Start Date: 07/30/24  Certification Period End Date: 10/27/24    Subjective : Foot still hurts - just not as bad. Finds straps from sandals are helpful. Slide on shoes are the worst. Doing exercises and glute sets with good benefit.   General  Reason for Referral: Thoracic and LBP, R lateral foot pain, R knee pain  Referred By: Nicolasa Mirza Pa-C  Past Medical History Relevant to Rehab: Remote C2-4 fusion, Remote spinal fusion due to scoliosis, Remote craniotomy, Osteoarthritis, PMR,HTN,Cancer,Fibromyalgia. (Possible reoccurance of meningioma - being followed.)  General Comment: Patient being seen for multiple complaints of LBP, knee pain and foot/ankle pain.    Performing HEP?: Yes    Precautions  Precautions  Precautions Comment: See medical history  Pain  Pain Assessment: CPOT (Critical Care Pain Observation Tool)  0-10 (Numeric) Pain Score: 4  Pain Type: Acute pain, Chronic pain  Pain Location:  (Low back, knee and foot.)    Objective   Treatments:  Manual Therapy  Manual Therapy Activity 1: STM/DTM of anterior retinaculum of L ankle. STM of lateral malleoli and achilles tendon  Manual Therapy Activity 3: Mulligan MWM of R ankle  Manual Therapy Activity 4: Grade II mobs of metatarsals - proximal and distal - calcaneal mobs to improve ankle INV/EV , and Talocrurual mobs for increased DF    OP EDUCATION:   Instruction to perform AP's, ankle circles CW/CCW to attempt to maintain gains made in therapy today. Client concerned about \"tweaking\" her knee with ankle movement - instructed to support knee to avoid increased knee movement. "     Assessment:  PT Assessment  Assessment Comment: Arrives late today with reports of increased R foot pain. Patient has been ill and has not been able to attend therapy, but continues with HEP. State she has no foot pain following manual today. Lateral knee tender at ITB insert and lateral hamstring . Would like to avoid any add'l exercise as she is concerned about flaring up tendons and joints if too much is added all at once. Good tolerance to session.    Plan:  OP PT Plan  Treatment/Interventions: Education/ Instruction, Gait training, Manual therapy, Neuromuscular re-education, Self care/ home management, Taping techniques, Therapeutic activities, Therapeutic exercises  PT Plan: Skilled PT  PT Frequency: 2 times per week  Duration: 6 weeks  Onset Date: 07/10/24  Certification Period Start Date: 07/30/24  Certification Period End Date: 10/27/24  Number of Treatments Authorized: 3/MN  Rehab Potential: Good  Plan of Care Agreement: Patient    Goals:  Active       PT Problem       PT Goal 1       Start:  07/30/24    Expected End:  10/27/24       1) Patient's KIARA score will improve 10% indicating return to PLOF with minimal back pain in 4-6 weeks.   2) Patient will perform appropriate HEP for hip strengthening for management of chronic condition in 4-6 weeks.   3) Patient will improve hip extension and abduction strength to 4+/5 to demonstrate increased proximal stability for prolonged waking in 4-6 weeks.          Patient Stated Goal 1       Start:  07/30/24    Expected End:  10/27/24       R foot ankle pain will resolve to allow previous walking distance and duration for return to usual daily chores and activities in 6 weeks.               Time Calculation  Start Time: 1147  Stop Time: 1217  Time Calculation (min): 30 min  PT Therapeutic Procedures Time Entry  Manual Therapy Time Entry: 25,

## 2024-08-30 ENCOUNTER — TELEMEDICINE CLINICAL SUPPORT (OUTPATIENT)
Dept: GENETICS | Facility: CLINIC | Age: 74
End: 2024-08-30
Payer: MEDICARE

## 2024-08-30 DIAGNOSIS — C50.911 MALIGNANT NEOPLASM OF RIGHT FEMALE BREAST, UNSPECIFIED ESTROGEN RECEPTOR STATUS, UNSPECIFIED SITE OF BREAST (MULTI): ICD-10-CM

## 2024-08-30 DIAGNOSIS — Z71.83 ENCOUNTER FOR NONPROCREATIVE GENETIC COUNSELING: Primary | ICD-10-CM

## 2024-08-30 DIAGNOSIS — C54.1 ENDOMETRIAL ADENOCARCINOMA (MULTI): ICD-10-CM

## 2024-09-04 ENCOUNTER — TREATMENT (OUTPATIENT)
Dept: PHYSICAL THERAPY | Facility: CLINIC | Age: 74
End: 2024-09-04
Payer: MEDICARE

## 2024-09-04 DIAGNOSIS — M54.16 LUMBAR RADICULITIS: ICD-10-CM

## 2024-09-04 PROCEDURE — 97110 THERAPEUTIC EXERCISES: CPT | Mod: GP | Performed by: PHYSICAL THERAPIST

## 2024-09-04 PROCEDURE — 97140 MANUAL THERAPY 1/> REGIONS: CPT | Mod: GP | Performed by: PHYSICAL THERAPIST

## 2024-09-04 ASSESSMENT — PAIN - FUNCTIONAL ASSESSMENT: PAIN_FUNCTIONAL_ASSESSMENT: 0-10

## 2024-09-04 ASSESSMENT — PAIN SCALES - GENERAL: PAINLEVEL_OUTOF10: 3

## 2024-09-04 NOTE — PROGRESS NOTES
"Physical Therapy Treatment    Patient Name: Roseanne Lira  MRN: [id number]  Today's Date: 9/4/2024    Current Problem  Problem List Items Addressed This Visit             ICD-10-CM    Lumbar radiculitis M54.16       Insurance:  Payor: [employer] MEDICARE / Plan: [employer] MURRAY MEDICARE / Product Type: *No Product type* /   Number of Treatments Authorized: 4/MN  Certification Period Start Date: 07/30/24  Certification Period End Date: 10/27/24    Subjective : Able to wear different shoes without pain. Patient reporting feeling much better at foot/ankle today, but would like to focus on thoracic spine where she is having increased pain.   General  Reason for Referral: Thoracic and LBP, R lateral foot pain, R knee pain  Referred By: Nicolasa Mirza Pa-C  Past Medical History Relevant to Rehab: Remote C2-4 fusion, Remote spinal fusion due to scoliosis, Remote craniotomy, Osteoarthritis, PMR,HTN,Cancer,Fibromyalgia. (Possible reoccurance of meningioma - being followed.)  General Comment: Patient being seen for multiple complaints of LBP, knee pain and foot/ankle pain. \"I have an area of my back that needs to be addressed.  I go through times of the day when i can't stand up.\" To see primary Md re: this on Monday.    Performing HEP?: Yes    Pain  Pain Assessment: 0-10  0-10 (Numeric) Pain Score: 3  Pain Type: Acute pain, Chronic pain  Pain Location: Back  Pain Orientation: Mid    Treatments:    Therapeutic Exercise  Therapeutic Exercise Activity 1: Ankle: DF/PF/EV/INV x 1 min ea with TTB  Therapeutic Exercise Activity 2: Scap retraction: 2 x 10 - YTB  Therapeutic Exercise Activity 3: Shoulder ER: YTB 2 x 10  Therapeutic Exercise Activity 4: Shoulder rolls x 5       Manual Therapy  Manual Therapy Activity 1: STM/DTM of T-2-7 paraspinals  Manual Therapy Activity 2: Seated P/A mobs of T2-7    OP EDUCATION:  Outpatient Education  Education Comment: Access Code: [email or web address]  URL: [email or web address]  Date: " 09/04/2024  Prepared by: Zohaib Carvalho    Exercises  - Seated Shoulder Row with Resistance Anchored at Feet  - 1-2 x daily - 5 x weekly - 1-2 sets - 10 reps  - Seated Shoulder W External Rotation on Swiss Ball  - 1-2 x daily - 5 x weekly - 1-2 sets - 10 reps    Assessment:  PT Assessment  Assessment Comment: Patient reporting feeling much better at foot/ankle today, but would like to focus on thoracic spine where she is having increased pain. Reporting little to no pain following manual therapy at thoracic spine. Issued exercises for HEP - rows, shoulder ER. Will benefit from continued theapy.    Plan:  OP PT Plan  Treatment/Interventions: Education/ Instruction, Gait training, Manual therapy, Neuromuscular re-education, Self care/ home management, Taping techniques, Therapeutic activities, Therapeutic exercises  PT Plan: Skilled PT  PT Frequency: 2 times per week  Duration: 6 weeks  Onset Date: 07/10/24  Certification Period Start Date: 07/30/24  Certification Period End Date: 10/27/24  Number of Treatments Authorized: 4/MN  Rehab Potential: Good  Plan of Care Agreement: Patient    Goals:  Active       PT Problem       PT Goal 1       Start:  07/30/24    Expected End:  10/27/24       1) Patient's KIARA score will improve 10% indicating return to PLOF with minimal back pain in 4-6 weeks.   2) Patient will perform appropriate HEP for hip strengthening for management of chronic condition in 4-6 weeks.   3) Patient will improve hip extension and abduction strength to 4+/5 to demonstrate increased proximal stability for prolonged waking in 4-6 weeks.          Patient Stated Goal 1       Start:  07/30/24    Expected End:  10/27/24       R foot ankle pain will resolve to allow previous walking distance and duration for return to usual daily chores and activities in 6 weeks.               Time Calculation  Start Time: 1445  Stop Time: 1525  Time Calculation (min): 40 min  PT Therapeutic Procedures Time Entry  Manual Therapy  Time Entry: 15  Therapeutic Exercise Time Entry: 23,

## 2024-09-09 ENCOUNTER — APPOINTMENT (OUTPATIENT)
Dept: PRIMARY CARE | Facility: CLINIC | Age: 74
End: 2024-09-09
Payer: MEDICARE

## 2024-09-09 ENCOUNTER — APPOINTMENT (OUTPATIENT)
Dept: PHYSICAL THERAPY | Facility: CLINIC | Age: 74
End: 2024-09-09
Payer: MEDICARE

## 2024-09-09 VITALS
TEMPERATURE: 98.2 F | DIASTOLIC BLOOD PRESSURE: 78 MMHG | HEIGHT: 64 IN | OXYGEN SATURATION: 95 % | WEIGHT: 187 LBS | HEART RATE: 61 BPM | SYSTOLIC BLOOD PRESSURE: 120 MMHG | BODY MASS INDEX: 31.92 KG/M2

## 2024-09-09 DIAGNOSIS — G89.29 CHRONIC BILATERAL THORACIC BACK PAIN: ICD-10-CM

## 2024-09-09 DIAGNOSIS — E78.5 DYSLIPIDEMIA: Primary | ICD-10-CM

## 2024-09-09 DIAGNOSIS — M54.6 CHRONIC BILATERAL THORACIC BACK PAIN: ICD-10-CM

## 2024-09-09 DIAGNOSIS — M25.473 ANKLE EDEMA: ICD-10-CM

## 2024-09-09 DIAGNOSIS — C41.2 MALIGNANT NEOPLASM OF VERTEBRAL COLUMN (MULTI): ICD-10-CM

## 2024-09-09 DIAGNOSIS — R09.89 LABILE HYPERTENSION: ICD-10-CM

## 2024-09-09 PROCEDURE — 3074F SYST BP LT 130 MM HG: CPT | Performed by: INTERNAL MEDICINE

## 2024-09-09 PROCEDURE — 1159F MED LIST DOCD IN RCRD: CPT | Performed by: INTERNAL MEDICINE

## 2024-09-09 PROCEDURE — 1125F AMNT PAIN NOTED PAIN PRSNT: CPT | Performed by: INTERNAL MEDICINE

## 2024-09-09 PROCEDURE — 3078F DIAST BP <80 MM HG: CPT | Performed by: INTERNAL MEDICINE

## 2024-09-09 PROCEDURE — 99214 OFFICE O/P EST MOD 30 MIN: CPT | Performed by: INTERNAL MEDICINE

## 2024-09-09 PROCEDURE — 1036F TOBACCO NON-USER: CPT | Performed by: INTERNAL MEDICINE

## 2024-09-09 RX ORDER — PRAVASTATIN SODIUM 10 MG/1
TABLET ORAL
Qty: 30 TABLET | Refills: 3 | Status: SHIPPED | OUTPATIENT
Start: 2024-09-09

## 2024-09-09 ASSESSMENT — PATIENT HEALTH QUESTIONNAIRE - PHQ9
SUM OF ALL RESPONSES TO PHQ9 QUESTIONS 1 AND 2: 2
1. LITTLE INTEREST OR PLEASURE IN DOING THINGS: NOT AT ALL
1. LITTLE INTEREST OR PLEASURE IN DOING THINGS: SEVERAL DAYS
SUM OF ALL RESPONSES TO PHQ9 QUESTIONS 1 AND 2: 0
2. FEELING DOWN, DEPRESSED OR HOPELESS: NOT AT ALL
2. FEELING DOWN, DEPRESSED OR HOPELESS: SEVERAL DAYS
10. IF YOU CHECKED OFF ANY PROBLEMS, HOW DIFFICULT HAVE THESE PROBLEMS MADE IT FOR YOU TO DO YOUR WORK, TAKE CARE OF THINGS AT HOME, OR GET ALONG WITH OTHER PEOPLE: NOT DIFFICULT AT ALL

## 2024-09-09 ASSESSMENT — COLUMBIA-SUICIDE SEVERITY RATING SCALE - C-SSRS
1. IN THE PAST MONTH, HAVE YOU WISHED YOU WERE DEAD OR WISHED YOU COULD GO TO SLEEP AND NOT WAKE UP?: NO
6. HAVE YOU EVER DONE ANYTHING, STARTED TO DO ANYTHING, OR PREPARED TO DO ANYTHING TO END YOUR LIFE?: NO
2. HAVE YOU ACTUALLY HAD ANY THOUGHTS OF KILLING YOURSELF?: NO

## 2024-09-09 ASSESSMENT — PAIN SCALES - GENERAL: PAINLEVEL: 4

## 2024-09-09 ASSESSMENT — ENCOUNTER SYMPTOMS
LOSS OF SENSATION IN FEET: 0
DEPRESSION: 0
OCCASIONAL FEELINGS OF UNSTEADINESS: 0

## 2024-09-09 NOTE — PATIENT INSTRUCTIONS
Watch close your carb intake. Start new cholesterol med call pravastatin. Continue amlodipine. Use furosemide for ankle edema as needed. Complete xrays, Continue PT. Complete flu and RSV vaccines in the fall. Return in 3 m

## 2024-09-09 NOTE — PROGRESS NOTES
"Subjective   Patient ID: Roseanne Lira is a 74 y.o. female who presents for bp, intolerance to lovastatin, edema, neck pain wants xray    HPI Unable to tolerate lovastatin , we discussed diet, she admits down fall with carbs, wants to try another statin.  She has not used furosemide as instructed, she stopped hydroclorotiazide. Continues taking amlodipine 2.5 mg bid. , In last 2 weeks after hot weather she notices edema again more on right than left, ( right side usually has more edema after knee replacement)    Review of Systems   Musculoskeletal:         She has spasm on thoracic spine as a band ,intermitently, his physical therapy started last week working on , improved temporal. She has home exercise.       Objective   /78 (BP Location: Left arm)   Pulse 61   Temp 36.8 °C (98.2 °F) (Oral)   Ht 1.626 m (5' 4\")   Wt 84.8 kg (187 lb)   SpO2 95%   BMI 32.10 kg/m²     Physical Exam  Eyes - conjunctivae clear, PERRLA  HEENT - no sinus tenderness  Neck - no cervical lymphadenopathy, no thyromegaly, right side remote scar  Axilla - no palpable lymphadenopathy  Cardiac- regular rate and rhythm, no murmurs, no carotid bruit, no JVP  Lung - clear to auscultation, no rales, no rhonchi, no wheezing  GI - normally active bowel sounds, non tender, non distended, no hepatosplenomegaly, no rebound  MSK - thoracic scoliosis,   Extremities - no edema, good distal pulses  Neuro - non focal, oriented x 3  Skin - no bruises, no rashes  Psychiatric - pleasant, well groom, no hallucinations    Assessment/Plan   Problem List Items Addressed This Visit             ICD-10-CM    Dyslipidemia - Primary E78.5     Trial of pravastatin         Relevant Medications    pravastatin (Pravachol) 10 mg tablet    Labile hypertension R09.89     Well control         Ankle edema M25.473     Advice to start prn furosemide         RESOLVED: Malignant neoplasm of vertebral column (Multi) C41.2    Chronic bilateral thoracic back pain M54.6, " G89.29    Relevant Orders    XR thoracic spine complete 4+ views

## 2024-09-10 ENCOUNTER — TREATMENT (OUTPATIENT)
Dept: PHYSICAL THERAPY | Facility: CLINIC | Age: 74
End: 2024-09-10
Payer: MEDICARE

## 2024-09-10 DIAGNOSIS — M54.16 LUMBAR RADICULITIS: ICD-10-CM

## 2024-09-10 PROCEDURE — 97110 THERAPEUTIC EXERCISES: CPT | Mod: GP | Performed by: PHYSICAL THERAPIST

## 2024-09-10 PROCEDURE — 97140 MANUAL THERAPY 1/> REGIONS: CPT | Mod: GP | Performed by: PHYSICAL THERAPIST

## 2024-09-10 ASSESSMENT — PAIN SCALES - GENERAL: PAINLEVEL_OUTOF10: 1

## 2024-09-10 ASSESSMENT — PAIN - FUNCTIONAL ASSESSMENT: PAIN_FUNCTIONAL_ASSESSMENT: 0-10

## 2024-09-10 NOTE — PROGRESS NOTES
"Physical Therapy Treatment    Patient Name: Roseanne Lira  MRN: [id number]  Today's Date: 9/10/2024    Current Problem  Problem List Items Addressed This Visit             ICD-10-CM    Lumbar radiculitis M54.16       Insurance:  Payor: [employer] / Plan: [employer] / Product Type: *No Product type* /   Number of Treatments Authorized: 5/MN  Certification Period Start Date: 07/30/24  Certification Period End Date: 10/27/24    Subjective : Foot / ankle doing better this past week. \"Still there.\"  Back was better following last session.   General  Reason for Referral: Thoracic and LBP, R lateral foot pain, R knee pain  Referred By: Nicolasa Mirza Pa-C  Past Medical History Relevant to Rehab: Remote C2-4 fusion, Remote spinal fusion due to scoliosis, Remote craniotomy, Osteoarthritis, PMR,HTN,Cancer,Fibromyalgia. (Possible reoccurance of meningioma - being followed.)  General Comment: Patient being seen for multiple complaints of LBP, knee pain and foot/ankle pain. \"I have an area of my back that needs to be addressed.  I go through times of the day when i can't stand up.\" To see primary Md re: this on Monday.    Performing HEP?: Yes    Precautions     Pain  Pain Assessment: 0-10  0-10 (Numeric) Pain Score: 1  Pain Type: Acute pain, Chronic pain  Pain Location: Back  Pain Orientation: Lower    Objective   Treatments:    Therapeutic Exercise  Therapeutic Exercise Activity 1: Ankle: DF/PF x 1 min ea with RTB  Therapeutic Exercise Activity 2: Scap retraction: 2 x 10 - YTB  Therapeutic Exercise Activity 3: Shoulder ER: YTB 2 x 10  Therapeutic Exercise Activity 4: Shoulder rolls: 3# -2  x 5       Manual Therapy  Manual Therapy Activity 1: STM/DTM of T2-7 paraspinals.  Manual Therapy Activity 2: STM/DTM of L/S spine in sitting.  Manual Therapy Activity 3: Seated P/A mobs grade II T3-6     OP EDUCATION:  Outpatient Education  Education Comment: Access Code: [email or web address]  URL: " [email or web address]  Date: 09/10/2024  Prepared by: Zohaib Carvalho    Exercises  - Standing Row with Resistance with Anchored Resistance at Chest Height Palms Down  - 1-2 x daily - 3-4 x weekly - 2 sets - 10 reps  - Shoulder External Rotation and Scapular Retraction with Resistance  - 1-2 x daily - 3-4 x weekly - 2 sets - 10 reps    Assessment:  PT Assessment  Assessment Comment: Patient reporting feeling much better at foot/ankle today, but would like to focus on thoracic spine where she is having increased pain. Reporting little to no pain following manual therapy at thoracic spine. Issued exercises for HEP - rows, shoulder ER. Will benefit from continued theapy.    Plan:  OP PT Plan  Treatment/Interventions: Education/ Instruction, Gait training, Manual therapy, Neuromuscular re-education, Self care/ home management, Taping techniques, Therapeutic activities, Therapeutic exercises  PT Plan: Skilled PT  PT Frequency: 2 times per week  Duration: 6 weeks  Onset Date: 07/10/24  Certification Period Start Date: 07/30/24  Certification Period End Date: 10/27/24  Number of Treatments Authorized: 5/MN  Rehab Potential: Good  Plan of Care Agreement: Patient    Goals:  Active       PT Problem       PT Goal 1       Start:  07/30/24    Expected End:  10/27/24       1) Patient's KIARA score will improve 10% indicating return to PLOF with minimal back pain in 4-6 weeks.   2) Patient will perform appropriate HEP for hip strengthening for management of chronic condition in 4-6 weeks.   3) Patient will improve hip extension and abduction strength to 4+/5 to demonstrate increased proximal stability for prolonged waking in 4-6 weeks.          Patient Stated Goal 1       Start:  07/30/24    Expected End:  10/27/24       R foot ankle pain will resolve to allow previous walking distance and duration for return to usual daily chores and activities in 6 weeks.               Time Calculation  Start Time: 1450  Stop  Time: 1530  Time Calculation (min): 40 min  PT Therapeutic Procedures Time Entry  Manual Therapy Time Entry: 23  Therapeutic Exercise Time Entry: 15,

## 2024-09-11 ENCOUNTER — APPOINTMENT (OUTPATIENT)
Dept: PHYSICAL THERAPY | Facility: CLINIC | Age: 74
End: 2024-09-11
Payer: MEDICARE

## 2024-09-12 ENCOUNTER — TREATMENT (OUTPATIENT)
Dept: PHYSICAL THERAPY | Facility: CLINIC | Age: 74
End: 2024-09-12
Payer: MEDICARE

## 2024-09-12 DIAGNOSIS — M54.16 LUMBAR RADICULITIS: ICD-10-CM

## 2024-09-12 PROCEDURE — 97140 MANUAL THERAPY 1/> REGIONS: CPT | Mod: GP | Performed by: PHYSICAL THERAPIST

## 2024-09-12 PROCEDURE — 97110 THERAPEUTIC EXERCISES: CPT | Mod: GP | Performed by: PHYSICAL THERAPIST

## 2024-09-12 ASSESSMENT — PAIN - FUNCTIONAL ASSESSMENT: PAIN_FUNCTIONAL_ASSESSMENT: 0-10

## 2024-09-12 ASSESSMENT — PAIN SCALES - GENERAL: PAINLEVEL_OUTOF10: 0 - NO PAIN

## 2024-09-12 NOTE — PROGRESS NOTES
"Physical Therapy Treatment    Patient Name: Roseanne Lira  MRN: [id number]  Today's Date: 9/12/2024    Current Problem  Problem List Items Addressed This Visit             ICD-10-CM    Lumbar radiculitis M54.16       Insurance:  Payor: [employer] / Plan: [employer]IC[employer] / Product Type: *No Product type* /   Number of Treatments Authorized: 6/MN  Certification Period Start Date: 07/30/24  Certification Period End Date: 10/27/24    Subjective : States she is feeling well - no pain complaints.  Willing to attempt more intense exercise.   General  Reason for Referral: Thoracic and LBP, R lateral foot pain, R knee pain  Referred By: Nicolasa Mirza Pa-C  Past Medical History Relevant to Rehab: Remote C2-4 fusion, Remote spinal fusion due to scoliosis, Remote craniotomy, Osteoarthritis, PMR,HTN,Cancer,Fibromyalgia. (Possible reoccurance of meningioma - being followed.)  General Comment: Patient being seen for multiple complaints of LBP, knee pain and foot/ankle pain. \"I have an area of my back that needs to be addressed.  I go through times of the day when i can't stand up.\" To see primary Md re: this on Monday.    Performing HEP?: Yes    Precautions     Pain  Pain Assessment: 0-10  0-10 (Numeric) Pain Score: 0 - No pain    Objective     Treatments:    Therapeutic Exercise  Therapeutic Exercise Activity 1: SciFit: Level 2 x 5 minutes  Therapeutic Exercise Activity 2: Slant board stretch: 1 min  Therapeutic Exercise Activity 3: Heel/toe raises: x 1 min  Therapeutic Exercise Activity 4: Standing hip abd: x 10 - B  Therapeutic Exercise Activity 5: Dynamics: Marches, Butt kicks, Tin soldiers, Side steps - 20 ft x 2  Therapeutic Exercise Activity 6: Leg swings: x 10 - ea    Balance/Neuromuscular Re-Education  Balance/Neuromuscular Re-Education Activity 1: AirEx Beam: static standing 15 seconds  Balance/Neuromuscular Re-Education Activity 2: AirEx Beam : head turns/nods x 5 ea  Balance/Neuromuscular Re-Education " Activity 3: AirEx Beam: side steps x 1 min    Manual Therapy  Manual Therapy Activity 1: STM/DTM of UT's bilaterally  Manual Therapy Activity 2: DTM of T2-7 paraspinals.  Manual Therapy Activity 3: Contract/relax of UT's 5/10 ratio x 5    Assessment:  PT Assessment  Assessment Comment: Patient arrives without pain complaint today. Willing to attempt increased intensity of exercise. Tolerated session without pain complaint. Added dynamics, stretches and light standing exercises. To monitor response. Will benefit form continued therapy to address activity goals.    Plan:  OP PT Plan  Treatment/Interventions: Education/ Instruction, Gait training, Manual therapy, Neuromuscular re-education, Self care/ home management, Taping techniques, Therapeutic activities, Therapeutic exercises  PT Plan: Skilled PT  PT Frequency: 2 times per week  Duration: 6 weeks  Onset Date: 07/10/24  Certification Period Start Date: 07/30/24  Certification Period End Date: 10/27/24  Number of Treatments Authorized: 6/MN  Rehab Potential: Good  Plan of Care Agreement: Patient    Goals:  Active       PT Problem       PT Goal 1       Start:  07/30/24    Expected End:  10/27/24       1) Patient's KIARA score will improve 10% indicating return to PLOF with minimal back pain in 4-6 weeks.   2) Patient will perform appropriate HEP for hip strengthening for management of chronic condition in 4-6 weeks.   3) Patient will improve hip extension and abduction strength to 4+/5 to demonstrate increased proximal stability for prolonged waking in 4-6 weeks.          Patient Stated Goal 1       Start:  07/30/24    Expected End:  10/27/24       R foot ankle pain will resolve to allow previous walking distance and duration for return to usual daily chores and activities in 6 weeks.               Time Calculation  Start Time: 1445  Stop Time: 1530  Time Calculation (min): 45 min  PT Therapeutic Procedures Time Entry  Manual Therapy Time Entry: 8  Neuromuscular  Re-Education Time Entry: 5  Therapeutic Exercise Time Entry: 26,

## 2024-09-16 ENCOUNTER — APPOINTMENT (OUTPATIENT)
Dept: OPHTHALMOLOGY | Facility: CLINIC | Age: 74
End: 2024-09-16
Payer: MEDICARE

## 2024-09-19 ENCOUNTER — TELEPHONE (OUTPATIENT)
Dept: GENETICS | Facility: CLINIC | Age: 74
End: 2024-09-19
Payer: MEDICARE

## 2024-09-19 NOTE — TELEPHONE ENCOUNTER
Called patient regarding outstanding sample submission for genetic testing ordered by JOSE RAMON Marmolejo. Confirmed that patient had received the blood draw kit and planned to go forward with genetic testing.

## 2024-09-24 ENCOUNTER — APPOINTMENT (OUTPATIENT)
Dept: PHYSICAL THERAPY | Facility: CLINIC | Age: 74
End: 2024-09-24
Payer: MEDICARE

## 2024-09-26 ENCOUNTER — TREATMENT (OUTPATIENT)
Dept: PHYSICAL THERAPY | Facility: CLINIC | Age: 74
End: 2024-09-26
Payer: MEDICARE

## 2024-09-26 ENCOUNTER — APPOINTMENT (OUTPATIENT)
Dept: PHYSICAL THERAPY | Facility: CLINIC | Age: 74
End: 2024-09-26
Payer: MEDICARE

## 2024-09-26 DIAGNOSIS — M54.16 LUMBAR RADICULITIS: ICD-10-CM

## 2024-09-26 PROCEDURE — 97140 MANUAL THERAPY 1/> REGIONS: CPT | Mod: GP | Performed by: PHYSICAL THERAPIST

## 2024-09-26 PROCEDURE — 97110 THERAPEUTIC EXERCISES: CPT | Mod: GP | Performed by: PHYSICAL THERAPIST

## 2024-09-26 ASSESSMENT — PAIN - FUNCTIONAL ASSESSMENT: PAIN_FUNCTIONAL_ASSESSMENT: 0-10

## 2024-09-26 ASSESSMENT — PAIN SCALES - GENERAL: PAINLEVEL_OUTOF10: 0 - NO PAIN

## 2024-09-26 NOTE — PROGRESS NOTES
"Physical Therapy Treatment    Patient Name: Roseanne Lira  MRN: [id number]  Today's Date: 9/26/2024    Current Problem  Problem List Items Addressed This Visit             ICD-10-CM    Lumbar radiculitis M54.16       Insurance:  Payor: [employer] / Plan: [employer] M[employer] MEDICARE / Product Type: *No Product type* /   Number of Treatments Authorized: 6/MN  Certification Period Start Date: 07/30/24  Certification Period End Date: 10/27/24    Subjective : Upper back not as painful as it has been. Controlling LBP with exercises. (GS and pelvic tilts.) R knee \"hurts all the time.   General  Reason for Referral: Thoracic and LBP, R lateral foot pain, R knee pain  Referred By: Nicolasa Mirza Pa-C  Past Medical History Relevant to Rehab: Remote C2-4 fusion, Remote spinal fusion due to scoliosis, Remote craniotomy, Osteoarthritis, PMR,HTN,Cancer,Fibromyalgia. (Possible reoccurance of meningioma - being followed.)  General Comment: Patient being seen for multiple complaints of LBP, knee pain and foot/ankle pain. \"I have an area of my back that needs to be addressed.  I go through times of the day when i can't stand up.\" To see primary Md re: this on Monday.    Performing HEP?: Yes      Pain  Pain Assessment: 0-10  0-10 (Numeric) Pain Score: 0 - No pain    Objective   Treatments:    Therapeutic Exercise  Therapeutic Exercise Activity 1: SciFit: Level 2.5 x 6 minutes  Therapeutic Exercise Activity 2: Slant board stretch: 1 min  Therapeutic Exercise Activity 3: Heel/toe raises: x 1 min  Therapeutic Exercise Activity 4: Standing hip abd: x 10 - B  Therapeutic Exercise Activity 5: Dynamics: Marches, Butt kicks, Tin soldiers, Side steps - 20 ft x 2  Therapeutic Exercise Activity 6: AirEx Beam: side steps x 2 min     Manual Therapy  Manual Therapy Activity 1: STM/DTM to R lateral ankle and 5th metatarsal - R retinaculum.  Manual Therapy Activity 2: MWM to R ankle with DF - mobs of 5th ray.    Assessment:  PT Assessment  Assessment " Comment: Patient arrives with min pain complaint today. Willing to continue with exercise vs just manual.  Tolerated session without pain complaint. Continued dynamics, stretches and light standing exercises. To monitor response. Will benefit form continued therapy to address activity goals.    Plan:  OP PT Plan  Treatment/Interventions: Education/ Instruction, Gait training, Manual therapy, Neuromuscular re-education, Self care/ home management, Taping techniques, Therapeutic activities, Therapeutic exercises  PT Plan: Skilled PT  PT Frequency: 2 times per week  Duration: 6 weeks  Onset Date: 07/10/24  Certification Period Start Date: 07/30/24  Certification Period End Date: 10/27/24  Number of Treatments Authorized: 6/MN  Rehab Potential: Good  Plan of Care Agreement: Patient    Goals:  Active       PT Problem       PT Goal 1       Start:  07/30/24    Expected End:  10/27/24       1) Patient's KIARA score will improve 10% indicating return to PLOF with minimal back pain in 4-6 weeks.   2) Patient will perform appropriate HEP for hip strengthening for management of chronic condition in 4-6 weeks.   3) Patient will improve hip extension and abduction strength to 4+/5 to demonstrate increased proximal stability for prolonged waking in 4-6 weeks.          Patient Stated Goal 1       Start:  07/30/24    Expected End:  10/27/24       R foot ankle pain will resolve to allow previous walking distance and duration for return to usual daily chores and activities in 6 weeks.               Time Calculation  Start Time: 1445  Stop Time: 1530  Time Calculation (min): 45 min  PT Therapeutic Procedures Time Entry  Manual Therapy Time Entry: 8  Therapeutic Exercise Time Entry: 30,

## 2024-10-01 ENCOUNTER — TREATMENT (OUTPATIENT)
Dept: PHYSICAL THERAPY | Facility: CLINIC | Age: 74
End: 2024-10-01
Payer: MEDICARE

## 2024-10-01 DIAGNOSIS — M54.16 LUMBAR RADICULITIS: ICD-10-CM

## 2024-10-01 PROCEDURE — 97110 THERAPEUTIC EXERCISES: CPT | Mod: GP | Performed by: PHYSICAL THERAPIST

## 2024-10-01 ASSESSMENT — PAIN - FUNCTIONAL ASSESSMENT: PAIN_FUNCTIONAL_ASSESSMENT: 0-10

## 2024-10-01 ASSESSMENT — PAIN SCALES - GENERAL: PAINLEVEL_OUTOF10: 0 - NO PAIN

## 2024-10-01 NOTE — PROGRESS NOTES
"Physical Therapy Treatment    Patient Name: Roseanne Lira  MRN: [id number]  Today's Date: 10/1/2024    Current Problem  Problem List Items Addressed This Visit             ICD-10-CM    Lumbar radiculitis M54.16       Insurance:  Payor: [employer] / Plan: [employer] M[employer] MEDICARE / Product Type: *No Product type* /   Number of Treatments Authorized: 7/MN  Certification Period Start Date: 07/30/24  Certification Period End Date: 10/27/24    Subjective : Notes increased LEFT ITB /hip pain that didn't allow her to walk around the block. Late start.   General  Reason for Referral: Thoracic and LBP, R lateral foot pain, R knee pain  Referred By: Nicolasa Mirza Pa-C  Past Medical History Relevant to Rehab: Remote C2-4 fusion, Remote spinal fusion due to scoliosis, Remote craniotomy, Osteoarthritis, PMR,HTN,Cancer,Fibromyalgia. (Possible reoccurance of meningioma - being followed.)  General Comment: Patient being seen for multiple complaints of LBP, knee pain and foot/ankle pain. \"I have an area of my back that needs to be addressed.  I go through times of the day when i can't stand up.\" To see primary Md re: this on Monday.    Performing HEP?: Yes    Precautions  Precautions  Precautions Comment: See medical history  Pain  Pain Assessment: 0-10  0-10 (Numeric) Pain Score: 0 - No pain    Objective   Treatments:    Therapeutic Exercise  Therapeutic Exercise Activity 1: SciFit: Level 2.5 x 6 minutes  Therapeutic Exercise Activity 2: Slant board stretch: 1 min  Therapeutic Exercise Activity 3: Heel/toe raises: x 10  Therapeutic Exercise Activity 5: Dynamics: Marches, Butt kicks, Tin soldiers, Side steps - 25 ft x 2  Therapeutic Exercise Activity 6: Seated thoracic extension: x 5     Manual Therapy  Manual Therapy Activity 1: STM/DTM ot thoracic paraspinals and L/S region    Assessment:  PT Assessment  Assessment Comment: Patient arrives with min pain complaint currently, but states she has noticed increased LBP and thoracic " pain during the day. Knees painful yesterday, but foot is ok. Willing to continue with exercise vs just manual.  Tolerated session without pain complaint. Continued dynamics, but needed support of rail to avoid foot pain.  To monitor response. Will benefit form continued therapy to address activity goals.    Plan:  OP PT Plan  Treatment/Interventions: Education/ Instruction, Gait training, Manual therapy, Neuromuscular re-education, Self care/ home management, Taping techniques, Therapeutic activities, Therapeutic exercises  PT Plan: Skilled PT  PT Frequency: 2 times per week  Duration: 6 weeks  Onset Date: 07/10/24  Certification Period Start Date: 07/30/24  Certification Period End Date: 10/27/24  Number of Treatments Authorized: 7/MN  Rehab Potential: Good  Plan of Care Agreement: Patient    Goals:  Active       PT Problem       PT Goal 1       Start:  07/30/24    Expected End:  10/27/24       1) Patient's KIARA score will improve 10% indicating return to PLOF with minimal back pain in 4-6 weeks.   2) Patient will perform appropriate HEP for hip strengthening for management of chronic condition in 4-6 weeks.   3) Patient will improve hip extension and abduction strength to 4+/5 to demonstrate increased proximal stability for prolonged waking in 4-6 weeks.          Patient Stated Goal 1       Start:  07/30/24    Expected End:  10/27/24       R foot ankle pain will resolve to allow previous walking distance and duration for return to usual daily chores and activities in 6 weeks.               Time Calculation  Start Time: 1450  Stop Time: 1525  Time Calculation (min): 35 min  PT Therapeutic Procedures Time Entry  Manual Therapy Time Entry: 6  Therapeutic Exercise Time Entry: 28,

## 2024-10-03 ENCOUNTER — APPOINTMENT (OUTPATIENT)
Dept: PHYSICAL THERAPY | Facility: CLINIC | Age: 74
End: 2024-10-03
Payer: MEDICARE

## 2024-10-08 ENCOUNTER — TREATMENT (OUTPATIENT)
Dept: PHYSICAL THERAPY | Facility: CLINIC | Age: 74
End: 2024-10-08
Payer: MEDICARE

## 2024-10-08 DIAGNOSIS — M54.16 LUMBAR RADICULITIS: ICD-10-CM

## 2024-10-08 PROCEDURE — 97110 THERAPEUTIC EXERCISES: CPT | Mod: GP | Performed by: PHYSICAL THERAPIST

## 2024-10-08 PROCEDURE — 97140 MANUAL THERAPY 1/> REGIONS: CPT | Mod: GP | Performed by: PHYSICAL THERAPIST

## 2024-10-08 ASSESSMENT — PAIN - FUNCTIONAL ASSESSMENT: PAIN_FUNCTIONAL_ASSESSMENT: 0-10

## 2024-10-08 NOTE — PROGRESS NOTES
Physical Therapy Treatment    Patient Name: Roseanne Lira  MRN: [id number]  Today's Date: 10/8/2024    Current Problem  Problem List Items Addressed This Visit             ICD-10-CM    Lumbar radiculitis M54.16       Insurance:  Payor: [employer] MEDICARE / Plan: [employer] M[employer] MEDICARE / Product Type: *No Product type* /   Number of Treatments Authorized: 8/MN  Certification Period Start Date: 07/30/24  Certification Period End Date: 10/27/24    Subjective : Sore Saturday and Sunday - better today. I can't walk around the block due to bilateral hip clicking.   General  Reason for Referral: Thoracic and LBP, R lateral foot pain, R knee pain  Referred By: Nicolasa Mirza Pa-C  Past Medical History Relevant to Rehab: Remote C2-4 fusion, Remote spinal fusion due to scoliosis, Remote craniotomy, Osteoarthritis, PMR,HTN,Cancer,Fibromyalgia. (Possible reoccurance of meningioma - being followed.)    Performing HEP?: Yes    Precautions  Precautions  Precautions Comment: See medical history  Pain  Pain Assessment: 0-10    Objective   Treatments:    Therapeutic Exercise  Therapeutic Exercise Activity 1: SciFit: Level 2.5 x 6 minutes  Therapeutic Exercise Activity 2: Slant board stretch: 1 min  Therapeutic Exercise Activity 3: Heel/toe raises: x 30 sec  Therapeutic Exercise Activity 4: Standing hip abd: x 10 - B  Therapeutic Exercise Activity 5: Dynamics: Marches, Butt kicks, Tin soldiers, Side steps - 15 ft x 2 (at window ledge)  Therapeutic Exercise Activity 6: Seated knee flexion w/slider: 1 min ea.    Balance/Neuromuscular Re-Education  Balance/Neuromuscular Re-Education Activity 1: AirEx Beam: side steps x 2 min  Balance/Neuromuscular Re-Education Activity 2: AirEx Beam: forward walking x 1.5 minutes    Manual Therapy  Manual Therapy Activity 1: STM/DTM of bilateral UT, posterior RTC tendon, Middle trap and thoracic and lumbar paraspinals.    OP EDUCATION:   Education re: progress in therapy aeb increased tolerance to land  based exercises.     Assessment:  PT Assessment  Assessment Comment: Patient tolerated session well. Demonstrating increased tolerance to exercise that is land based as she continues to perform water based exercise independently 1-2 x a week. Patient concerned she is having a PMR flare up - but seems better today. Will benefit from continued therapy to address generalized weakness and chronic pain.    Plan:  OP PT Plan  Treatment/Interventions: Education/ Instruction, Gait training, Manual therapy, Neuromuscular re-education, Self care/ home management, Taping techniques, Therapeutic activities, Therapeutic exercises  PT Plan: Skilled PT  PT Frequency: 2 times per week  Duration: 6 weeks  Onset Date: 07/10/24  Certification Period Start Date: 07/30/24  Certification Period End Date: 10/27/24  Number of Treatments Authorized: 8/MN  Rehab Potential: Good  Plan of Care Agreement: Patient  PN next visit.   Goals:  Active       PT Problem       PT Goal 1       Start:  07/30/24    Expected End:  10/27/24       1) Patient's KIARA score will improve 10% indicating return to PLOF with minimal back pain in 4-6 weeks.   2) Patient will perform appropriate HEP for hip strengthening for management of chronic condition in 4-6 weeks.   3) Patient will improve hip extension and abduction strength to 4+/5 to demonstrate increased proximal stability for prolonged waking in 4-6 weeks.          Patient Stated Goal 1       Start:  07/30/24    Expected End:  10/27/24       R foot ankle pain will resolve to allow previous walking distance and duration for return to usual daily chores and activities in 6 weeks.               Time Calculation  Start Time: 1445  Stop Time: 1530  Time Calculation (min): 45 min  PT Therapeutic Procedures Time Entry  Manual Therapy Time Entry: 8  Neuromuscular Re-Education Time Entry: 5  Therapeutic Exercise Time Entry: 25,

## 2024-10-10 ENCOUNTER — TREATMENT (OUTPATIENT)
Dept: PHYSICAL THERAPY | Facility: CLINIC | Age: 74
End: 2024-10-10
Payer: MEDICARE

## 2024-10-10 DIAGNOSIS — M54.16 LUMBAR RADICULITIS: ICD-10-CM

## 2024-10-10 PROCEDURE — 97530 THERAPEUTIC ACTIVITIES: CPT | Mod: GP | Performed by: PHYSICAL THERAPIST

## 2024-10-10 PROCEDURE — 97140 MANUAL THERAPY 1/> REGIONS: CPT | Mod: GP | Performed by: PHYSICAL THERAPIST

## 2024-10-10 ASSESSMENT — PAIN SCALES - GENERAL: PAINLEVEL_OUTOF10: 2

## 2024-10-10 ASSESSMENT — PAIN - FUNCTIONAL ASSESSMENT: PAIN_FUNCTIONAL_ASSESSMENT: 0-10

## 2024-10-10 NOTE — PROGRESS NOTES
"Physical Therapy Treatment    Patient Name: Roseanne Lira  MRN: [id number]  Today's Date: 10/10/2024    Current Problem  Problem List Items Addressed This Visit             ICD-10-CM    Lumbar radiculitis M54.16       Insurance:  Payor: [employer] / Plan: [employer] / Product Type: *No Product type* /   Number of Treatments Authorized: 9/MN (+ 6 visits on 10/10/24.)  Certification Period Start Date: 07/30/24  Certification Period End Date: 10/27/24    Subjective : L foot pain \"is gone in the last week.\" Lumbar and thoracic pain are the same. Taking less Tylenol.   To see Rheumatologist on Wednesday.   Has had increased R knee pain since last session.   Tired and painful thoracic region while baking yesterday.   General  Reason for Referral: Thoracic and LBP, R lateral foot pain, R knee pain  Referred By: Nicolasa Mirza Pa-C  Past Medical History Relevant to Rehab: Remote C2-4 fusion, Remote spinal fusion due to scoliosis, Remote craniotomy, Osteoarthritis, PMR,HTN,Cancer,Fibromyalgia. (Possible reoccurance of meningioma - being followed.)  General Comment: \"I have a new pain.\"  L SI region.    Performing HEP?: Yes    Precautions  Precautions  Precautions Comment: See medical history  Pain  Pain Assessment: 0-10  0-10 (Numeric) Pain Score: 2  Pain Type: Chronic pain  Pain Location:  (Back, knees)  R knee 4/10  L 4-6/10 ( giving away in last few days)   To have TKA on L knee 12/2/24  Objective   Posture:  Flat thoracic spine due to fusion.      Hip AROM    WFL     Knee AROM  R: 0- 124 deg  L: 0- 126 deg     Knee Strength  Extension: 5/5 bilaterally  Flexion: 4+/5 bilaterally     Ankle AROM  WNL in all planes.      Ankle Strength  5/5 throughout     Palpation  Tender at lateral R foot/ankle.  Tender at lateral maleoli with A/P grade II mobs - improved with reps.   Tender at L SI and glute medius regions.      Specific Lower Extremity MMT    Hip Flexion: 4+/5 bilaterally   Hip Abduction: 4/5 " bilaterally   Hip Extension: 4/5 bilaterallly     Outcome Measures:  Other Measures  Oswestry Disablity Index (KIARA): 5 (5/50 = 10%)    Treatments:  Manual Therapy  Manual Therapy Activity 1: STM of patellar tendon bilaterally/ L SI and hip abd group    Therapeutic Activity  Therapeutic Activity 1: See measures and goal status    OP EDUCATION:   Edu re: strengthening prep for upcoming L TKA.     Assessment:  PT Assessment  Assessment Comment: Patient having various pain complaints depending on activity - likely due to history of Fibromyalgia, PMR, significant orthopedic issues and surgeries.  L knee has deteriorated and now patient plans on having TKA in December.  Patient would like to continue with therapy to prepare her body (especially L knee) for upcoing surgery. Patient continues to perform water program about 1 x a week to help manage other chronic pain issues. Will benefit from continued therapy to address generalized weakness and chronic pain. Recommend + 6 visits.    Plan:  OP PT Plan  Treatment/Interventions: Education/ Instruction, Gait training, Manual therapy, Neuromuscular re-education, Self care/ home management, Taping techniques, Therapeutic activities, Therapeutic exercises  PT Plan: Skilled PT  PT Frequency: 2 times per week  Duration: 6 weeks  Onset Date: 07/10/24  Certification Period Start Date: 07/30/24  Certification Period End Date: 10/27/24  Number of Treatments Authorized: 9/MN (+ 6 visits on 10/10/24.)  Rehab Potential: Good  Plan of Care Agreement: Patient    Goals:  Active       PT Problem       PT Goal 1       Start:  07/30/24    Expected End:  10/27/24       1) Patient's KIARA score will improve 10% indicating return to PLOF with minimal back pain in 4-6 weeks. PROGRESSING TOWARDS.  2) Patient will perform appropriate HEP for hip strengthening for management of chronic condition in 4-6 weeks. PROGRESSING AND ONGOING.   3) Patient will improve hip extension and abduction strength to 4+/5  to demonstrate increased proximal stability for prolonged waking in 4-6 weeks. PROGRESSING.          Patient Stated Goal 1       Start:  07/30/24    Expected End:  10/27/24       R foot ankle pain will resolve to allow previous walking distance and duration for return to usual daily chores and activities in 6 weeks. ACHIEVED.               Time Calculation  Start Time: 1445  Stop Time: 1530  Time Calculation (min): 45 min  PT Therapeutic Procedures Time Entry  Manual Therapy Time Entry: 10  Therapeutic Activity Time Entry: 28,

## 2024-10-14 PROBLEM — M17.12 UNILATERAL PRIMARY OSTEOARTHRITIS, LEFT KNEE: Status: ACTIVE | Noted: 2024-10-13

## 2024-10-14 NOTE — TELEPHONE ENCOUNTER
Called patient regarding outstanding sample submission for genetic testing ordered by JOSE RAMON Marmolejo. Patient confirmed that she planned to get her blood drawn this week.

## 2024-10-15 ENCOUNTER — APPOINTMENT (OUTPATIENT)
Dept: PHYSICAL THERAPY | Facility: CLINIC | Age: 74
End: 2024-10-15
Payer: MEDICARE

## 2024-10-16 ENCOUNTER — TELEPHONE (OUTPATIENT)
Dept: RHEUMATOLOGY | Facility: CLINIC | Age: 74
End: 2024-10-16

## 2024-10-16 ENCOUNTER — HOSPITAL ENCOUNTER (OUTPATIENT)
Dept: RADIOLOGY | Facility: CLINIC | Age: 74
Discharge: HOME | End: 2024-10-16
Payer: MEDICARE

## 2024-10-16 ENCOUNTER — OFFICE VISIT (OUTPATIENT)
Dept: RHEUMATOLOGY | Facility: CLINIC | Age: 74
End: 2024-10-16

## 2024-10-16 ENCOUNTER — PREP FOR PROCEDURE (OUTPATIENT)
Dept: SURGERY | Facility: HOSPITAL | Age: 74
End: 2024-10-16

## 2024-10-16 ENCOUNTER — LAB (OUTPATIENT)
Dept: LAB | Facility: LAB | Age: 74
End: 2024-10-16

## 2024-10-16 VITALS
OXYGEN SATURATION: 97 % | HEIGHT: 64 IN | WEIGHT: 191 LBS | HEART RATE: 71 BPM | BODY MASS INDEX: 32.61 KG/M2 | SYSTOLIC BLOOD PRESSURE: 117 MMHG | DIASTOLIC BLOOD PRESSURE: 76 MMHG

## 2024-10-16 DIAGNOSIS — M35.3 PMR (POLYMYALGIA RHEUMATICA) (MULTI): Primary | ICD-10-CM

## 2024-10-16 DIAGNOSIS — H53.9 VISION CHANGES: ICD-10-CM

## 2024-10-16 DIAGNOSIS — M17.12 UNILATERAL PRIMARY OSTEOARTHRITIS, LEFT KNEE: ICD-10-CM

## 2024-10-16 DIAGNOSIS — M35.3 PMR (POLYMYALGIA RHEUMATICA) (MULTI): ICD-10-CM

## 2024-10-16 DIAGNOSIS — C54.1 ENDOMETRIAL ADENOCARCINOMA (MULTI): ICD-10-CM

## 2024-10-16 DIAGNOSIS — H53.133 SUDDEN VISUAL LOSS, BILATERAL: ICD-10-CM

## 2024-10-16 DIAGNOSIS — G93.89 OTHER SPECIFIED DISORDERS OF BRAIN: ICD-10-CM

## 2024-10-16 DIAGNOSIS — C50.911 MALIGNANT NEOPLASM OF RIGHT FEMALE BREAST, UNSPECIFIED ESTROGEN RECEPTOR STATUS, UNSPECIFIED SITE OF BREAST: ICD-10-CM

## 2024-10-16 DIAGNOSIS — I77.6 ARTERITIS, UNSPECIFIED (CMS-HCC): Primary | ICD-10-CM

## 2024-10-16 DIAGNOSIS — M54.6 CHRONIC BILATERAL THORACIC BACK PAIN: ICD-10-CM

## 2024-10-16 DIAGNOSIS — G89.29 CHRONIC BILATERAL THORACIC BACK PAIN: ICD-10-CM

## 2024-10-16 LAB
ALBUMIN SERPL BCP-MCNC: 4.6 G/DL (ref 3.4–5)
ALP SERPL-CCNC: 63 U/L (ref 33–136)
ALT SERPL W P-5'-P-CCNC: 10 U/L (ref 7–45)
ANION GAP SERPL CALC-SCNC: 16 MMOL/L (ref 10–20)
AST SERPL W P-5'-P-CCNC: 14 U/L (ref 9–39)
BILIRUB SERPL-MCNC: 0.5 MG/DL (ref 0–1.2)
BUN SERPL-MCNC: 24 MG/DL (ref 6–23)
CALCIUM SERPL-MCNC: 9.6 MG/DL (ref 8.6–10.6)
CHLORIDE SERPL-SCNC: 100 MMOL/L (ref 98–107)
CO2 SERPL-SCNC: 28 MMOL/L (ref 21–32)
CREAT SERPL-MCNC: 1.17 MG/DL (ref 0.5–1.05)
CRP SERPL-MCNC: 1.81 MG/DL
EGFRCR SERPLBLD CKD-EPI 2021: 49 ML/MIN/1.73M*2
GLUCOSE SERPL-MCNC: 79 MG/DL (ref 74–99)
HBV CORE AB SER QL: NONREACTIVE
HBV CORE IGM SER QL: NONREACTIVE
HBV SURFACE AB SER-ACNC: <3.1 MIU/ML
HBV SURFACE AG SERPL QL IA: NONREACTIVE
HCV AB SER QL: NONREACTIVE
POTASSIUM SERPL-SCNC: 4.5 MMOL/L (ref 3.5–5.3)
PROT SERPL-MCNC: 7 G/DL (ref 6.4–8.2)
SODIUM SERPL-SCNC: 139 MMOL/L (ref 136–145)

## 2024-10-16 PROCEDURE — 1036F TOBACCO NON-USER: CPT | Performed by: STUDENT IN AN ORGANIZED HEALTH CARE EDUCATION/TRAINING PROGRAM

## 2024-10-16 PROCEDURE — 86140 C-REACTIVE PROTEIN: CPT

## 2024-10-16 PROCEDURE — 72072 X-RAY EXAM THORAC SPINE 3VWS: CPT | Performed by: RADIOLOGY

## 2024-10-16 PROCEDURE — 87340 HEPATITIS B SURFACE AG IA: CPT

## 2024-10-16 PROCEDURE — 1159F MED LIST DOCD IN RCRD: CPT | Performed by: STUDENT IN AN ORGANIZED HEALTH CARE EDUCATION/TRAINING PROGRAM

## 2024-10-16 PROCEDURE — 85025 COMPLETE CBC W/AUTO DIFF WBC: CPT

## 2024-10-16 PROCEDURE — 85652 RBC SED RATE AUTOMATED: CPT

## 2024-10-16 PROCEDURE — 9990000009 MISCELLANEOUS GENETICS TEST

## 2024-10-16 PROCEDURE — 80053 COMPREHEN METABOLIC PANEL: CPT

## 2024-10-16 PROCEDURE — 3074F SYST BP LT 130 MM HG: CPT | Performed by: STUDENT IN AN ORGANIZED HEALTH CARE EDUCATION/TRAINING PROGRAM

## 2024-10-16 PROCEDURE — 86705 HEP B CORE ANTIBODY IGM: CPT

## 2024-10-16 PROCEDURE — 86481 TB AG RESPONSE T-CELL SUSP: CPT

## 2024-10-16 PROCEDURE — 86803 HEPATITIS C AB TEST: CPT

## 2024-10-16 PROCEDURE — 99215 OFFICE O/P EST HI 40 MIN: CPT | Performed by: STUDENT IN AN ORGANIZED HEALTH CARE EDUCATION/TRAINING PROGRAM

## 2024-10-16 PROCEDURE — 86706 HEP B SURFACE ANTIBODY: CPT

## 2024-10-16 PROCEDURE — 1125F AMNT PAIN NOTED PAIN PRSNT: CPT | Performed by: STUDENT IN AN ORGANIZED HEALTH CARE EDUCATION/TRAINING PROGRAM

## 2024-10-16 PROCEDURE — 36415 COLL VENOUS BLD VENIPUNCTURE: CPT

## 2024-10-16 PROCEDURE — 72072 X-RAY EXAM THORAC SPINE 3VWS: CPT

## 2024-10-16 PROCEDURE — 86704 HEP B CORE ANTIBODY TOTAL: CPT

## 2024-10-16 PROCEDURE — 3078F DIAST BP <80 MM HG: CPT | Performed by: STUDENT IN AN ORGANIZED HEALTH CARE EDUCATION/TRAINING PROGRAM

## 2024-10-16 RX ORDER — PREDNISONE 20 MG/1
60 TABLET ORAL DAILY
Qty: 90 TABLET | Refills: 0 | Status: SHIPPED | OUTPATIENT
Start: 2024-10-16

## 2024-10-16 ASSESSMENT — PAIN SCALES - GENERAL: PAINLEVEL_OUTOF10: 4

## 2024-10-16 NOTE — PATIENT INSTRUCTIONS
see Dr Glynn Guerrero 10:20 AM on Tuesday 10/22 at 10:20 AM     Please get labs today    Please get a temporal artery Ultrasound stat    You will need a temporal artery biopsy; If we can't get someone before you leave, I can admit you for a couple days    Start Prednisone 60 mg daily with food    See me in 1 week

## 2024-10-16 NOTE — PROGRESS NOTES
Subjective   Patient ID: Roseanne Lira is a 74 y.o. female who presents for Pain (Patient complains of a PMR flare-up. Patient have pain all over her body. Patient states she also has off and on pains in her Yazidi. ).  HPI: Female with a history of polymyalgia rheumatica in remission; last saw Dr. 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 , ostepenia on bone density; established care with me 6/2024 ; she wa sin remission at that time    Historical labs: CCP neg, RF neg    7/2024 labs:  WBC normal, Hgb normal, platelets normal  Cr H to 1.0, ALP/AST/ALT/albumin/protein normal     Rheum hx per patient:   -Dx around 2019  -Was put on pred 15 ,and brought her off over 11 months  -Around 2021, patient sxs were intense pain; she does not exactly remember where; She had pain in shoulders, coming down arms, legs also hurt  -Occasionally, patient has aches on both shoulders  -Her arms get very tried washing her hair,     Has AM stiffness just for a few min. Has lower back pain for a few min      10/2024- having widespread pain off and on; has been having bilateral temple pain since 7/2024 (L>R); mostly on L side, but also on R side. Patient is having pain on her right ankles; also getting mild right arm here and there. Patient also has more enlarged veins on face. Patient has chronic knee pain ; and is pending 12/2/2024 left knee second replacement.  She is also having vision changes for the past 3 weeks.  She saw her neurologist for these bilateral temporal headaches, they are short and lasting a few minutes and happen several times a day;  "he did an MRI which was unremarkable.    Rheumatology specific review of systems  Regular joint pain, morning stiffness>30 min, fevers , chills, unintentional weight loss, rashes, alopecia, mouth sores, nasal ulcers, malar rash,  Raynauds, morning stiffness in back>30 min, dry eyes, dry mouth, blood clots, recurrent miscarriages, rheum fam hx, uveitis, blood or mucus in stool     Chronic pain specific review of systems   widespread pain , widespread tenderness, brain fog, depression/anxiety, migraines or tension headaches, IBS or heartburn symptoms, irritable or overactive bladder, pelvic pain ,TMJ pain,poor sleep, fatigue    Objective   /76 (Patient Position: Sitting)   Pulse 71   Ht 1.626 m (5' 4\")   Wt 86.6 kg (191 lb)   SpO2 97% Comment: room air  BMI 32.79 kg/m²       Physical Exam  Constitutional: Alert and in no acute distress. Well developed, well nourished  Head and Face: Head and face: Normal.    Cardiovascular: Heart rate and rhythm were normal, normal S1 and S2. No peripheral edema.   Pulmonary: No respiratory distress. Clear bilateral breath sounds.  Musculoskeletal: no synovitis througout, No TTP over temporal arteries  Skin: Normal skin color and pigmentation, normal skin turgor, and no rash.    Psychiatric: Judgment and insight: Intact. Mood and affect: Normal.     Lab Results   Component Value Date    WBC 7.5 03/27/2024    HGB 14.2 03/27/2024    HCT 44.6 03/27/2024     03/27/2024    ALT 14 07/09/2024    AST 16 07/09/2024    CREATININE 1.07 (H) 07/09/2024          Lab Results   Component Value Date    SEDRATE 22 07/09/2024    CRP 1.60 (H) 07/09/2024   \\            There is currently no information documented on the homunculus. Go to the Rheumatology activity and complete the homunculus joint exam.      === 06/13/24 ===    DEXA BONE DENSITY    - Impression -  DEXA:  According to World Health Organization criteria,  classification is low bone mass (osteopenia)    Followup recommended " in two years or sooner as clinically warranted.    All images and detailed analysis are available on the  Radiology  PACS.    MACRO:  None    Signed by: Ever Bains 6/13/2024 12:56 PM  Dictation workstation:   [id number]    Assessment/Plan:  #PMR  -I reviewed Dr. Antunez's records that patient brought with her on June 2024  -in remission, but still has subjectively weak arms  -Symptomatically, still in remission.  Baseline ESR and CRP done 7/2024 when in remission: sed rate normal at 22, CRP elevated to 1.6  -Patient counseled on symptoms of giant cell arteritis and instructed to go to the ED immediately ([facility] is preferable) if they get new vision changes or severe headache, they were instructed that they should tell the ED physician that their rheumatologist has told them that they may be at risk for giant cell arteritis and high dose steroids should be started while they are working it up.  -Has chronic vision changes; per patient, her neurologist Dr. Robledo does not think this is related to her migraine.  Referral placed to neuro-ophthalmology Dr. Leal June 2024; she has not established yet    #Concern for GCA, vision threatening  -New temporal headaches occurring several times a day, left worse than right, with unchanged MRI, since July 2024.  Vision changes since the end of September 2024  -I spoke with the staff of her ophthalmologist Dr Glynn Guerrero today-will see her on October 22, 2024 and look for signs of GCA  -I have messaged surgery today about seeing if he can do a bilateral temporal long segment biopsy (greater than 1 cm) prior to patient going out of town October 25, 2024-nov 8 2024; another option would be to admit her if he cannot do it before then  Will await surgery's response  -Need to start prednisone; 60 mg daily, start today-1  mo prescribed October 2024  -labs today  -ER precautions given  -Get labs today: CBC, CMP, ESR, CRP, hep serologies, TB serology  -bilateral vascular  US ordered 10/2024 stat    #OA  -Sees orthopedics, has a right knee replacement and is pending a left knee replacement  -Sees orthospine for her osteoarthritis  -Requesting a referral for acupuncture as this has helped in the past; referral placed June 2024 but patient counseled to call and see if her insurance covers it, and if not, how much her out-of-pocket cost will be    #Osteopenia  -on 6.2024 dexa- repeat 6.2026  -continue vit d 5000 units daily   -recommended eating extra ca in diet (patient with hx of kidney stones)  -walk as tolerated    Patient counseled to seek medical care if any new or worsening symptoms, urgently if needed.      Note will be sent to primary care doctor and Dr Glynn Phillips    Return to clinic in 1 week, lab today, sooner if needed    Dragon dictation software was used to dictate this note. Errors may have occurred during dictation that was not intended by the user.

## 2024-10-16 NOTE — H&P (VIEW-ONLY)
Subjective   Patient ID: Roseanne Lira is a 74 y.o. female who presents for Pain (Patient complains of a PMR flare-up. Patient have pain all over her body. Patient states she also has off and on pains in her Scientologist. ).  HPI: Female with a history of polymyalgia rheumatica in remission; last saw Dr. 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 , ostepenia on bone density; established care with me 6/2024 ; she wa sin remission at that time    Historical labs: CCP neg, RF neg    7/2024 labs:  WBC normal, Hgb normal, platelets normal  Cr H to 1.0, ALP/AST/ALT/albumin/protein normal     Rheum hx per patient:   -Dx around 2019  -Was put on pred 15 ,and brought her off over 11 months  -Around 2021, patient sxs were intense pain; she does not exactly remember where; She had pain in shoulders, coming down arms, legs also hurt  -Occasionally, patient has aches on both shoulders  -Her arms get very tried washing her hair,     Has AM stiffness just for a few min. Has lower back pain for a few min      10/2024- having widespread pain off and on; has been having bilateral temple pain since 7/2024 (L>R); mostly on L side, but also on R side. Patient is having pain on her right ankles; also getting mild right arm here and there. Patient also has more enlarged veins on face. Patient has chronic knee pain ; and is pending 12/2/2024 left knee second replacement.  She is also having vision changes for the past 3 weeks.  She saw her neurologist for these bilateral temporal headaches, they are short and lasting a few minutes and happen several times a day;  "he did an MRI which was unremarkable.    Rheumatology specific review of systems  Regular joint pain, morning stiffness>30 min, fevers , chills, unintentional weight loss, rashes, alopecia, mouth sores, nasal ulcers, malar rash,  Raynauds, morning stiffness in back>30 min, dry eyes, dry mouth, blood clots, recurrent miscarriages, rheum fam hx, uveitis, blood or mucus in stool     Chronic pain specific review of systems   widespread pain , widespread tenderness, brain fog, depression/anxiety, migraines or tension headaches, IBS or heartburn symptoms, irritable or overactive bladder, pelvic pain ,TMJ pain,poor sleep, fatigue    Objective   /76 (Patient Position: Sitting)   Pulse 71   Ht 1.626 m (5' 4\")   Wt 86.6 kg (191 lb)   SpO2 97% Comment: room air  BMI 32.79 kg/m²       Physical Exam  Constitutional: Alert and in no acute distress. Well developed, well nourished  Head and Face: Head and face: Normal.    Cardiovascular: Heart rate and rhythm were normal, normal S1 and S2. No peripheral edema.   Pulmonary: No respiratory distress. Clear bilateral breath sounds.  Musculoskeletal: no synovitis througout, No TTP over temporal arteries  Skin: Normal skin color and pigmentation, normal skin turgor, and no rash.    Psychiatric: Judgment and insight: Intact. Mood and affect: Normal.     Lab Results   Component Value Date    WBC 7.5 03/27/2024    HGB 14.2 03/27/2024    HCT 44.6 03/27/2024     03/27/2024    ALT 14 07/09/2024    AST 16 07/09/2024    CREATININE 1.07 (H) 07/09/2024          Lab Results   Component Value Date    SEDRATE 22 07/09/2024    CRP 1.60 (H) 07/09/2024   \\            There is currently no information documented on the homunculus. Go to the Rheumatology activity and complete the homunculus joint exam.      === 06/13/24 ===    DEXA BONE DENSITY    - Impression -  DEXA:  According to World Health Organization criteria,  classification is low bone mass (osteopenia)    Followup recommended " in two years or sooner as clinically warranted.    All images and detailed analysis are available on the  Radiology  PACS.    MACRO:  None    Signed by: Ever Bains 6/13/2024 12:56 PM  Dictation workstation:   [id number]    Assessment/Plan:  #PMR  -I reviewed Dr. Antunez's records that patient brought with her on June 2024  -in remission, but still has subjectively weak arms  -Symptomatically, still in remission.  Baseline ESR and CRP done 7/2024 when in remission: sed rate normal at 22, CRP elevated to 1.6  -Patient counseled on symptoms of giant cell arteritis and instructed to go to the ED immediately ([facility] is preferable) if they get new vision changes or severe headache, they were instructed that they should tell the ED physician that their rheumatologist has told them that they may be at risk for giant cell arteritis and high dose steroids should be started while they are working it up.  -Has chronic vision changes; per patient, her neurologist Dr. Robledo does not think this is related to her migraine.  Referral placed to neuro-ophthalmology Dr. Leal June 2024; she has not established yet    #Concern for GCA, vision threatening  -New temporal headaches occurring several times a day, left worse than right, with unchanged MRI, since July 2024.  Vision changes since the end of September 2024  -I spoke with the staff of her ophthalmologist Dr Glynn Guerrero today-will see her on October 22, 2024 and look for signs of GCA  -I have messaged surgery today about seeing if he can do a bilateral temporal long segment biopsy (greater than 1 cm) prior to patient going out of town October 25, 2024-nov 8 2024; another option would be to admit her if he cannot do it before then  Will await surgery's response  -Need to start prednisone; 60 mg daily, start today-1  mo prescribed October 2024  -labs today  -ER precautions given  -Get labs today: CBC, CMP, ESR, CRP, hep serologies, TB serology  -bilateral vascular  US ordered 10/2024 stat    #OA  -Sees orthopedics, has a right knee replacement and is pending a left knee replacement  -Sees orthospine for her osteoarthritis  -Requesting a referral for acupuncture as this has helped in the past; referral placed June 2024 but patient counseled to call and see if her insurance covers it, and if not, how much her out-of-pocket cost will be    #Osteopenia  -on 6.2024 dexa- repeat 6.2026  -continue vit d 5000 units daily   -recommended eating extra ca in diet (patient with hx of kidney stones)  -walk as tolerated    Patient counseled to seek medical care if any new or worsening symptoms, urgently if needed.      Note will be sent to primary care doctor and Dr Glynn Phillips    Return to clinic in 1 week, lab today, sooner if needed    Dragon dictation software was used to dictate this note. Errors may have occurred during dictation that was not intended by the user.

## 2024-10-17 ENCOUNTER — TELEPHONE (OUTPATIENT)
Dept: RHEUMATOLOGY | Facility: CLINIC | Age: 74
End: 2024-10-17

## 2024-10-17 ENCOUNTER — ANCILLARY PROCEDURE (OUTPATIENT)
Dept: VASCULAR MEDICINE | Facility: CLINIC | Age: 74
End: 2024-10-17
Payer: MEDICARE

## 2024-10-17 ENCOUNTER — APPOINTMENT (OUTPATIENT)
Dept: VASCULAR MEDICINE | Facility: CLINIC | Age: 74
End: 2024-10-17
Payer: MEDICARE

## 2024-10-17 DIAGNOSIS — M35.3 PMR (POLYMYALGIA RHEUMATICA) (MULTI): ICD-10-CM

## 2024-10-17 DIAGNOSIS — H53.9 VISION CHANGES: ICD-10-CM

## 2024-10-17 DIAGNOSIS — G93.89 OTHER SPECIFIED DISORDERS OF BRAIN: ICD-10-CM

## 2024-10-17 DIAGNOSIS — H53.133 SUDDEN VISUAL LOSS, BILATERAL: ICD-10-CM

## 2024-10-17 PROBLEM — I77.6 ARTERITIS, UNSPECIFIED (CMS-HCC): Status: ACTIVE | Noted: 2024-10-16

## 2024-10-17 LAB
BASOPHILS # BLD AUTO: 0.07 X10*3/UL (ref 0–0.1)
BASOPHILS NFR BLD AUTO: 0.9 %
EOSINOPHIL # BLD AUTO: 0.19 X10*3/UL (ref 0–0.4)
EOSINOPHIL NFR BLD AUTO: 2.3 %
ERYTHROCYTE [DISTWIDTH] IN BLOOD BY AUTOMATED COUNT: 14 % (ref 11.5–14.5)
ERYTHROCYTE [SEDIMENTATION RATE] IN BLOOD BY WESTERGREN METHOD: 14 MM/H (ref 0–30)
HCT VFR BLD AUTO: 43.3 % (ref 36–46)
HGB BLD-MCNC: 13.7 G/DL (ref 12–16)
IMM GRANULOCYTES # BLD AUTO: 0.02 X10*3/UL (ref 0–0.5)
IMM GRANULOCYTES NFR BLD AUTO: 0.2 % (ref 0–0.9)
LYMPHOCYTES # BLD AUTO: 1.85 X10*3/UL (ref 0.8–3)
LYMPHOCYTES NFR BLD AUTO: 22.6 %
MCH RBC QN AUTO: 27.3 PG (ref 26–34)
MCHC RBC AUTO-ENTMCNC: 31.6 G/DL (ref 32–36)
MCV RBC AUTO: 86 FL (ref 80–100)
MONOCYTES # BLD AUTO: 0.67 X10*3/UL (ref 0.05–0.8)
MONOCYTES NFR BLD AUTO: 8.2 %
NEUTROPHILS # BLD AUTO: 5.38 X10*3/UL (ref 1.6–5.5)
NEUTROPHILS NFR BLD AUTO: 65.8 %
NRBC BLD-RTO: 0 /100 WBCS (ref 0–0)
PLATELET # BLD AUTO: 419 X10*3/UL (ref 150–450)
RBC # BLD AUTO: 5.01 X10*6/UL (ref 4–5.2)
WBC # BLD AUTO: 8.2 X10*3/UL (ref 4.4–11.3)

## 2024-10-17 PROCEDURE — 93886 INTRACRANIAL COMPLETE STUDY: CPT | Mod: REDUCED SERVICES | Performed by: SURGERY

## 2024-10-17 PROCEDURE — 93886 INTRACRANIAL COMPLETE STUDY: CPT | Mod: 52

## 2024-10-18 LAB
NIL(NEG) CONTROL SPOT COUNT: NORMAL
PANEL A SPOT COUNT: 0
PANEL B SPOT COUNT: 1
POS CONTROL SPOT COUNT: NORMAL
T-SPOT. TB INTERPRETATION: NEGATIVE

## 2024-10-19 DIAGNOSIS — I10 BENIGN ESSENTIAL HYPERTENSION: ICD-10-CM

## 2024-10-19 DIAGNOSIS — E78.5 DYSLIPIDEMIA: ICD-10-CM

## 2024-10-20 NOTE — PROGRESS NOTES
Urogynecology  Provider:  Anastacia Pack MD  [phone]    ASSESSMENT AND PLAN:   74 year old female with LS and UUI/OAB. Patient has a hx of uterovaginal prolapse and MIKALA s/p prior MUS, LSC, and USLS on 8/18/2024. Comorbidities include: Stage 1 endometrial cancer managed by Dr. Demetria Trent in Gyn/Onc and of note she recently completed RT tx. History of breast cancer and did not take Tamoxifen.      Diagnoses:   #1 Uterovaginal prolapse (resolved)  #2 Stress urinary incontinence (resolved)   #3 Lichen sclerosus   #4 Overactive bladder, urge incontinence      Plan:   1. Uterovaginal prolapse, MIKALA   - PVR = 0mL by bladder U/S.  - 8/18/2023: TLH, BS, and bilateral SLND by Dr. Demetria Trent with combined case LSC, USLS, MUS, and cystoscopy by Dr. Pack. Comorbidities include:   - No evidence of POP or MIKALA recurrence per patient subjective symptoms. There is no evidence of mesh erosion from the MUS visualized or palpated upon pelvic exam today.     2. LS  - Upon vulvar exam she has skin changes consistent with lichen sclerosus with a small area of a hematoma from irritation but no concerning lesions or masses.   - We advised her to continue using topical Clotrimazole-Betamethasone cream on her vulva and reassured her she can use this low-potency steroid/antifungal cream up to BID to help reduce symptoms of LS.      3. OAB, UUI  - We discussed the etiology of overactive bladder with the neurological aging process. We reviewed that OAB symptoms occur when the detrusor muscle contracts/squeezes allowing the feeling of urgency and frequency prior to the bladder actually being full enough to void (I.e. inappropriate bladder spasms).   - We discussed OAB lifestyle changes (i.e., trying to limit fluids to 60oz in total per day, timed voiding every 2-3 hours, stop drinking fluids 3 hours prior to bedtime, and avoiding/limiting bladder irritants such as caffeine, nicotine, artificial sweeteners, acidic/spicy foods, and  alcohol).  - Discussed OAB treatment options such as lifestyle changes, PFPT, medications, PTNS, intradetrusor Botox injections, or SNM.   - She is amenable to continuing Kegel exercises and lifestyle changes at this time; declines interest in staring an OAB medication.     4. Endometrial cancer, stage 1  - Followed by Dr. Demetria Trent in Gyn/Onc.   - Recently completed RT and is now pursuing surveillance visits every 3 months but is presently BINH.      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    Follow up in 6 months with Dr. Pack.     Scribe Attestation  By signing my name below, I, Bryce Freeman, attest that this documentation has been prepared under the direction and in the presence of Anastacia Pack MD on 10/21/2024 at 8:13 PM.     Agree with above. I Dr. Pack, personally performed the services described in the documentation which was scribed virtually and confirm it is both complete and accurate.  Anastacia Pack MD      Problem List Items Addressed This Visit    None          I spent a total of eConsult Time: 25 minutes in face to face and non face to face time.        Anastacia Pack MD        HISTORY OF PRESENT ILLNESS:   74 year old female presenting for an annual mesh check pelvic exam visit.     Records Review:   - Last visit 5/2024   ASSESSMENT AND PLAN:   73 year old female with LS and hx of uterovaginal prolapse and MIKALA. Comorbidities include: Stage 1 endometrial cancer managed by Dr. Demetria Trent in Gyn/Onc and of note she recently completed RT tx. History of breast cancer and did not take Tamoxifen.      Diagnoses:   #1 Uterovaginal prolapse (resolved)  #2 Stress urinary incontinence (resolved)  #3 Lichen sclerosus      Plan:   1. Uterovaginal prolapse, MIKALA   - 8/18/2023: TLH, BS, and bilateral SLND by Dr. Demetria Trent with combined case LSC, USLS, MUS, and cystoscopy by Dr. Pack.   - No evidence  of POP or MIKALA by clinical exam and subjectively per the patient.      2. LS  - Does endorse occasional vulvar itching related to her LS diagnosis but uses Lotrisone cream as needed to help manage this without any recent flares.   - Upon vulvar exam she has skin changes consistent with lichen sclerosus but no concerning lesions or masses.   - Sent Rx of Clotrimazole-Betamethasone cream to her pharmacy and reassured her she can use this low-potency steroid/antifungal cream up to BID to help reduce symptoms of LS.      3. Endometrial cancer, stage 1  - Followed by Dr. Demetria Trent in Gyn/Onc.   - Recently completed RT and is now pursuing surveillance visits every 3 months but is presently BINH.       Follow up in 6 months with Dr. Pack for a vulvar check.      Urinary Symptoms:   - Denies any return of MIKALA with coughing, laughing, sneezing, or activity since her midurethral sling was placed in 2023.   - She endorses OAB symptoms with urgency and UUI leakage on her way to the bathroom.     Prolapse Symptoms:   - She does not feel any recurrent vaginal bulge since her USLS repair surgery in 2023.     Vulvar Symptoms:   - Continue using Lotrisone cream 1x/week and denies any recent LS flares.        Past Medical History:    Past Medical History:   Diagnosis Date    Age-related nuclear cataract, left eye 03/25/2017    Age-related nuclear cataract of left eye    Age-related nuclear cataract, right eye 03/25/2017    Age-related nuclear cataract of right eye    Allergy status to unspecified drugs, medicaments and biological substances     History of seasonal allergies    Cortical age-related cataract, left eye 03/25/2017    Cortical age-related cataract of left eye    Cortical age-related cataract, right eye 03/25/2017    Cortical age-related cataract of right eye    Diarrhea, unspecified 09/25/2018    Acute diarrhea    Dizziness and giddiness 06/10/2020    Postural dizziness with presyncope    Elevated blood-pressure  reading, without diagnosis of hypertension 10/16/2019    Prehypertension    Encounter for general adult medical examination without abnormal findings 09/14/2020    Preventative health care    Encounter for other preprocedural examination     Preoperative exam for gynecologic surgery    Encounter for screening mammogram for malignant neoplasm of breast 06/03/2013    Visit for screening mammogram    Epigastric pain 01/24/2020    Abdominal pain, acute, epigastric    Headache, unspecified 10/19/2020    Sinus headache    Intraductal carcinoma in situ of unspecified breast 04/01/2015    DCIS (ductal carcinoma in situ) of breast    Leukoplakia of vulva     Lichen sclerosus of female genitalia    Long term (current) use of systemic steroids 08/27/2019    Current chronic use of systemic steroids    Malignant neoplasm of vertebral column ([facility]) 09/09/2024    Other chest pain 10/16/2019    Atypical chest pain    Other conditions influencing health status 10/25/2019    Chronic use of steroids    Other disturbances of skin sensation 05/19/2015    Burning sensation    Other malaise 08/23/2018    Malaise and fatigue    Other pericardial effusion (noninflammatory) ([facility]) 04/07/2015    Pericardial effusion    Personal history of malignant neoplasm of bone 07/11/2019    History of malignant neoplasm of bone    Personal history of other diseases of the respiratory system 04/01/2015    History of asthma    Personal history of other diseases of the respiratory system 04/05/2017    History of acute bronchitis    Personal history of other diseases of the respiratory system 01/03/2014    History of upper respiratory infection    Personal history of other specified conditions 03/30/2015    History of abdominal pain    Personal history of urinary (tract) infections 06/23/2015    History of urinary tract infection    Personal history of urinary (tract) infections 04/05/2017    History of acute cystitis    Unspecified visual disturbance  07/07/2020    Visual disturbances    Urinary tract infection, site not specified 05/13/2021    Acute UTI       Past Surgical History:     Past Surgical History:   Procedure Laterality Date    BREAST LUMPECTOMY Right     with radiation in 2011    BREAST SURGERY  06/03/2013    Breast Surgery    COLONOSCOPY  06/03/2013    Complete Colonoscopy    CT ANGIO NECK  12/09/2022    CT NECK ANGIO W AND WO IV CONTRAST 12/9/2022 [facility]    CT HEAD ANGIO W AND WO IV CONTRAST  12/09/2022    CT HEAD ANGIO W AND WO IV CONTRAST 12/9/2022 [facility]    MR HEAD ANGIO WO IV CONTRAST  07/27/2020    MR HEAD ANGIO WO IV CONTRAST 7/27/2020 [facility]    MR HEAD ANGIO WO IV CONTRAST  05/18/2017    MR HEAD ANGIO WO IV CONTRAST 5/18/2017 [facility]    MR NECK ANGIO WO IV CONTRAST  07/27/2020    MR NECK ANGIO WO IV CONTRAST 7/27/2020 [facility]    MR NECK ANGIO WO IV CONTRAST  05/18/2017    MR NECK ANGIO WO IV CONTRAST 5/18/2017 [facility]    OTHER SURGICAL HISTORY  06/03/2013    Craniotomy Supratentorial Excision Of Meningioma    OTHER SURGICAL HISTORY  10/19/2020    Cataract surgery    OTHER SURGICAL HISTORY  03/13/2017    Arthrodesis Cervical    OTHER SURGICAL HISTORY  03/13/2017    Craniotomy Infratentorial Excision Of Meningioma    SPINE SURGERY  06/03/2013    Spine Repair    TUBAL LIGATION  05/19/2015    Tubal Ligation       Medications:     Prior to Admission medications    Medication Sig Start Date End Date Taking? Authorizing Provider   albuterol 90 mcg/actuation inhaler Inhale 2 puffs once daily as needed for wheezing. Q6-8hrs prn sob 6/3/24   Debbie Garza, APRN-CNP   amLODIPine (Norvasc) 2.5 mg tablet Take 1 tablet (2.5 mg) by mouth 2 times a day. 4/16/24   Page Lopez MD   calcium carbonate-vitamin D3 600 mg-20 mcg (800 unit) tablet Take 1 tablet by mouth once daily.    Historical Provider, MD   cetirizine (ZyrTEC) 10 mg tablet Take 1 tablet (10 mg) by mouth  once daily. 9/16/13   Historical Provider, MD   cholecalciferol (Vitamin D-3) 5,000 Units tablet Take 1 tablet (5,000 Units) by mouth early in the morning..    Historical Provider, MD   clobetasol (Temovate) 0.05 % cream 1 Application 11/18/16   Historical Provider, MD   clotrimazole-betamethasone (Lotrisone) cream 1 Application 5/2/24   Anastacia Pack MD   cyanocobalamin (Vitamin B-12) 100 mcg tablet Take 1 tablet (100 mcg) by mouth once daily.    Historical Provider, MD   famotidine (Pepcid) 40 mg tablet Take 1 tablet (40 mg) by mouth once daily at bedtime. 7/11/24   Page Lopez MD   fluticasone (Flonase) 50 mcg/actuation nasal spray Administer 1 spray into affected nostril(s) once daily as needed.    Historical Provider, MD   furosemide (Lasix) 20 mg tablet TAKE 1 TABLET BY MOUTH EVERY DAY AS NEEDED FOR EDEMA  Patient not taking: Reported on 10/16/2024 8/5/24   Demetria Cook PA-C   LORazepam (Ativan) 0.5 mg tablet Take 1 tablet (0.5 mg) by mouth once daily as needed.    Historical Provider, MD   MAGNESIUM GLUCONATE ORAL Take 500 mg by mouth once daily.    Historical Provider, MD   pravastatin (Pravachol) 10 mg tablet Take one tablet qhs 9/9/24   Page Lopez MD   predniSONE (Deltasone) 20 mg tablet Take 3 tablets (60 mg) by mouth once daily. Take in the morning with food.  Do not use NSAIDs while on high-dose prednisone. 10/16/24   Brooke Xie MD   venlafaxine XR (Effexor-XR) 150 mg 24 hr capsule Take 1 capsule (150 mg) by mouth once daily.    Historical Provider, MD   venlafaxine XR (Effexor-XR) 37.5 mg 24 hr capsule TAKE ONE ALONG WITH 150 MG DAILY 7/24/23   Historical Provider, MD   vitamin E mixed 400 unit capsule Take 1 capsule by mouth once daily.    Historical Provider, MD MULLER  Review of Systems   Constitutional: Negative.    HENT: Negative.     Eyes: Negative.    Respiratory: Negative.     Cardiovascular: Negative.    Gastrointestinal: Negative.    Endocrine:  Negative.    Genitourinary: Negative.    Musculoskeletal: Negative.    Neurological: Negative.    Psychiatric/Behavioral: Negative.          Blood, Urine   Date Value Ref Range Status   04/30/2021 NEGATIVE NEGATIVE Final     Nitrite, Urine   Date Value Ref Range Status   04/30/2021 NEGATIVE NEGATIVE Final     Urobilinogen, Urine   Date Value Ref Range Status   04/30/2021 <2.0 0.0 - 1.9 mg/dL Final         PHYSICAL EXAM:    There were no vitals taken for this visit.  No LMP recorded. Patient has had a hysterectomy.      Declines chaperone for physical exam.      Well developed, well nourished, in no apparent distress.   Neurologic/Psychiatric:  Awake, Alert and Oriented times 3.  Affect normal.     GENITAL/URINARY:     External Genitalia:  The patient has normal appearing external genitalia, normal skenes and bartholins glands, and a normal hair distribution.  Her vulva is without lesions, erythema or discharge.  It is non-tender with appropriate sensation. Lichen sclerosus is stable, small area of hematoma from irritation, no concerning masses or lesions.     Urethral Meatus:  Size normal, Location normal, Lesions absent, Prolapse absent.     Urethra:  Fullness absent, Masses absent. Negative CST in the supine position.     Bladder:  Fullness absent, Masses absent, Tenderness absent.     Vagina:  General appearance normal, Discharge absent, Lesions absent. Midurethral sling is intact, no mesh erosion visualized or palpated. Vaginal epithelium is hypoestrogenic.     Cervix: surgically absent  Uterus:  surgically absent     Anus/Perineum:  Lesions absent and masses absent. Normal anus without hemorrhoids. Perineum is intact.      Stress urinary incontinence not demonstrable.         POP-Q:  Stage: 0  Position: supine lithotomy    Data and DIAGNOSTIC STUDIES REVIEWED   XR thoracic spine 3 views    Result Date: 10/17/2024  Interpreted By:  Paxton Talavera, STUDY: XR THORACIC SPINE 3 VIEWS; ;  10/16/2024 2:51 pm    INDICATION: Signs/Symptoms:worsening mid thoracic pain.   ,M54.6 Pain in thoracic spine,G89.29 Other chronic pain   COMPARISON: None.   ACCESSION NUMBER(S): [id number]   ORDERING CLINICIAN: SOFIA GONZALES   FINDINGS: Thoracic spine, three views   There is no fracture. There is no spondylolisthesis. There is no disc space narrowing or osteophytosis. The prevertebral soft tissues are within normal limits.       Normal radiographs of the T-spine     MACRO: None   Signed by: Paxton Talavera 10/17/2024 6:46 PM Dictation workstation:   [id number]    Vascular US temporal artery duplex bilateral    Result Date: 10/17/2024           [place] [address] [facility], [place] [address]            Tel [phone]  Vascular Lab Report VASC US TEMPORAL ARTERY DUPLEX BILATERAL  Patient Name:      MANSI Mathis Physician: [id number] Xavier Muñoz DO Study Date:        10/17/2024          Ordering           [id number] NINFA MENDEZ                                        Physician:         KAMRON MRN/PID:           [id number]            Technologist:      Mariangel Tapia RVT Accession#:        [id number]        Technologist 2: Date of Birth/Age: 1950 / 74      Encounter#:        [id number]                    years Gender:            F Admission Status:  Outpatient          Location           [facility]                                        Performed:  Diagnosis/ICD: Sudden visual loss, bilateral-H53.133 CPT Codes:     89806 Cerebrovascular Carotid Duplex scan limited  CONCLUSIONS: Right Carotid: Extremely tortuous temporal artery noted. Vessel appears patent. Size and peak systoloc velocities are as follows: Proximal- 2.4 mm, 53 cm/sec Mid-1.6 mm, 82 cm/sec Distal- 1.0 mm, 43 cm/sec. Left Carotid: Extremely tortuous temporal artery noted. Vessel appears patent. Size and peak systoloc velocities are as follows: Proximal- 1.5 mm, 76 cm/sec Mid- 1.6 mm, 54 cm/sec Distal- 1.6 mm, 70 cm/sec.  Imaging &  Doppler Findings:  [id number] Xavier Muñoz DO Electronically signed by [id number] Xavier Muñoz DO on 10/17/2024 at 1:15:20 PM  ** Final **      Lab Results   Component Value Date    URINECULTURE CANCELED 08/14/2023      Lab Results   Component Value Date    GLUCOSE 79 10/16/2024    CALCIUM 9.6 10/16/2024     10/16/2024    K 4.5 10/16/2024    CO2 28 10/16/2024     10/16/2024    BUN 24 (H) 10/16/2024    CREATININE 1.17 (H) 10/16/2024     Lab Results   Component Value Date    WBC 8.2 10/16/2024    HGB 13.7 10/16/2024    HCT 43.3 10/16/2024    MCV 86 10/16/2024     10/16/2024          Anastacia Pack MD

## 2024-10-21 ENCOUNTER — OFFICE VISIT (OUTPATIENT)
Dept: OBSTETRICS AND GYNECOLOGY | Facility: CLINIC | Age: 74
End: 2024-10-21
Payer: MEDICARE

## 2024-10-21 ENCOUNTER — ANESTHESIA EVENT (OUTPATIENT)
Dept: OPERATING ROOM | Facility: CLINIC | Age: 74
End: 2024-10-21
Payer: MEDICARE

## 2024-10-21 VITALS
WEIGHT: 190.8 LBS | HEART RATE: 78 BPM | SYSTOLIC BLOOD PRESSURE: 144 MMHG | HEIGHT: 64 IN | DIASTOLIC BLOOD PRESSURE: 78 MMHG | BODY MASS INDEX: 32.58 KG/M2

## 2024-10-21 DIAGNOSIS — N39.9 URINARY DISORDER: Primary | ICD-10-CM

## 2024-10-21 LAB
POC APPEARANCE, URINE: CLEAR
POC BILIRUBIN, URINE: NEGATIVE
POC BLOOD, URINE: NEGATIVE
POC COLOR, URINE: YELLOW
POC GLUCOSE, URINE: NEGATIVE MG/DL
POC KETONES, URINE: NEGATIVE MG/DL
POC LEUKOCYTES, URINE: ABNORMAL
POC NITRITE,URINE: NEGATIVE
POC PH, URINE: 6 PH
POC PROTEIN, URINE: ABNORMAL MG/DL
POC SPECIFIC GRAVITY, URINE: 1.02
POC UROBILINOGEN, URINE: 0.2 EU/DL

## 2024-10-21 PROCEDURE — 3077F SYST BP >= 140 MM HG: CPT | Performed by: OBSTETRICS & GYNECOLOGY

## 2024-10-21 PROCEDURE — 1159F MED LIST DOCD IN RCRD: CPT | Performed by: OBSTETRICS & GYNECOLOGY

## 2024-10-21 PROCEDURE — 51798 US URINE CAPACITY MEASURE: CPT | Performed by: OBSTETRICS & GYNECOLOGY

## 2024-10-21 PROCEDURE — 1036F TOBACCO NON-USER: CPT | Performed by: OBSTETRICS & GYNECOLOGY

## 2024-10-21 PROCEDURE — 99213 OFFICE O/P EST LOW 20 MIN: CPT | Performed by: OBSTETRICS & GYNECOLOGY

## 2024-10-21 PROCEDURE — 1126F AMNT PAIN NOTED NONE PRSNT: CPT | Performed by: OBSTETRICS & GYNECOLOGY

## 2024-10-21 PROCEDURE — 81003 URINALYSIS AUTO W/O SCOPE: CPT | Performed by: OBSTETRICS & GYNECOLOGY

## 2024-10-21 PROCEDURE — 3078F DIAST BP <80 MM HG: CPT | Performed by: OBSTETRICS & GYNECOLOGY

## 2024-10-21 RX ORDER — AMLODIPINE BESYLATE 2.5 MG/1
2.5 TABLET ORAL 2 TIMES DAILY
Qty: 180 TABLET | Refills: 1 | Status: SHIPPED | OUTPATIENT
Start: 2024-10-21

## 2024-10-21 RX ORDER — TRIAMCINOLONE ACETONIDE 1 MG/ML
LOTION TOPICAL DAILY
COMMUNITY

## 2024-10-21 RX ORDER — PRAVASTATIN SODIUM 10 MG/1
TABLET ORAL
Qty: 90 TABLET | Refills: 1 | Status: SHIPPED | OUTPATIENT
Start: 2024-10-21

## 2024-10-21 RX ORDER — PREDNISONE 20 MG/1
60 TABLET ORAL DAILY
Qty: 90 TABLET | Refills: 0 | Status: SHIPPED | OUTPATIENT
Start: 2024-10-21 | End: 2024-10-24 | Stop reason: SDUPTHER

## 2024-10-21 ASSESSMENT — ENCOUNTER SYMPTOMS
EYES NEGATIVE: 1
ENDOCRINE NEGATIVE: 1
NEUROLOGICAL NEGATIVE: 1
CARDIOVASCULAR NEGATIVE: 1
PSYCHIATRIC NEGATIVE: 1
RESPIRATORY NEGATIVE: 1
MUSCULOSKELETAL NEGATIVE: 1
GASTROINTESTINAL NEGATIVE: 1
CONSTITUTIONAL NEGATIVE: 1

## 2024-10-21 ASSESSMENT — PAIN SCALES - GENERAL: PAINLEVEL_OUTOF10: 0-NO PAIN

## 2024-10-22 ASSESSMENT — ENCOUNTER SYMPTOMS
JOINT SWELLING: 1
ARTHRALGIAS: 1

## 2024-10-22 NOTE — PREPROCEDURE INSTRUCTIONS
Pre-Operative Instructions &?Checklist      Your surgery has been scheduled at [facility] at [address] [place], [address], [address], in the [facility]. Parking is to the left of the main entrance.     You will be contacted about the time of your surgery the day before your surgery (if your surgery is on a Monday, you will be called the Friday before surgery). If you are unable to answer the phone, a detailed voicemail message will be left. Make sure that your voicemail box is not full so a message can be left. If you have not received a call by 3:00 pm you may call [phone] between the hours of 3:00 and 4:00 pm. Please be available by phone the night before/day of surgery in case there is a change in the schedule which may require you to arrive earlier/later.     ?     14 DAYS BEFORE SURGERY STOP TAKING WEIGHT LOSS MEDICATIONS      ?7 DAYS BEFORE SURGERY STOP THESE MEDICATIONS:       *Multiple Vitamins containing Vitamin E       * Herbal supplements, Fish Oil, garlic pills, turmeric, CoQ enzyme       *Stop taking aspirin, aspirin-containing products, and NSAID's like Advil, Motrin, Aleve, and Ibuprofen. Tylenol is okay to take for pain relief.        *If you are currently taking Coumadin/Warfarin, we will have to coordinate that with your PCP &/or the Anticoagulation Clinic.     THE DAY BEFORE SURGERY:       *Do not eat any food after midnight the night before surgery.        *You are permitted to have no more than 4 ounces of clear liquids such as water, apple juice, plain tea or coffee (no milk or creamer), clear electrolyte-replenishing         drinks such as Pedialyte, Gatorade, or Powerade  (not yogurt or pulp-containing smoothies or juices such as orange juice) up to 3 hours before your arrival time.     DAY OF SURGERY TAKE THESE MEDICATIONS (if it is not listed, do not take it.)    Take: Amlodipine; famotidine; prednisone; venlafaxine; bring your albuterol inhaler  with you to the surgery center  If taking medications in tablet/capsule form take with a small sip of water.     ON THE MORNING OF SURGERY:       *Shower either the night before your surgery or the morning of your surgery       *Do not use moisturizers, creams, lotions or perfume, or make-up.       *Wear comfortable, loose fitting clothing.        *All jewelry and valuables should be left at home.       *Prosthetic devices such as contact lenses, hearing aids, dentures, eyelash extensions, hairpins and body piercings must be removed before surgery. Bring         containers for eyeglasses/contacts, dentures, or hearing aids with you.     ? Diabetics: Please check fasting blood sugars upon waking up. ?If fasting blood sugars are <80ml/dl, please drink 100ml/3oz. of apple juice no later than 2 hours prior                    to surgery.     ?BRING WITH YOU:        *Photo ID and insurance card       *Current list of medicines and allergies       *Pacemaker/Defibrillator/Heart stent cards       *Copy of your complete Advanced Directive/DHPOA, or proof of guardianship-if applicable     ?SMOKING:       *Quitting smoking can make a huge difference to your health and recovery from surgery. ?       *If you need help with quitting, call 8-166-[phone].       ALCOHOL:       *No alcoholic beverages for 48 hours before surgery.     ?AFTER OUTPATIENT SURGERY:       *A responsible adult MUST accompany you at the time of discharge and stay with you for 24 hours after your surgery.       *You may NOT drive yourself home after surgery.       *You may use a taxi or ride sharing service ([employer], Uber) to return home ONLY if you are accompanied by a friend or family member       *Instructions for resuming your medications will be provided by your surgeon.     CONTACT SURGEON'S OFFICE IF YOU DEVELOP:       *Fever =/>?100.4 F        *New respiratory symptoms (e.g. cough, shortness of breath, respiratory distress, sore throat)       *Recent  loss of taste or smell       *Flu like symptoms such as headache, fatigue or gastrointestinal symptoms       *If you develop any open sores, shingles, burning or painful urination    AND/OR:       *You no longer wish to have the surgery.       *Any other personal circumstances change that may lead to the need to cancel or defer this surgery.       *You were admitted to any hospital within one week of your planned procedure.     ?If you have any questions regarding these preoperative instructions, you may call [phone]. If you have questions regarding you surgical procedure, or post-operative care/recovery please call your surgeon's office.     Link to [facility] Laboratory Services Locations   [email or web address]     Link to [email or web address]   [email or web address]

## 2024-10-22 NOTE — CPM/PAT H&P
CPM/PAT Evaluation       Name: Roseanne Lira   /Age: 1950       TELEMEDICINE ENCOUNTER  Patient was interviewed by telephone for preadmission testing perioperative risk assessment prior to surgery.    DATE OF CONSULT: 10/22/2024  REFERRING PROVIDER: Dr. Luis Carlos Barton  SURGERY, DATE, AND LENGTH: Temporal artery biopsy; 10/23/2024; 60 minutes    CHIEF COMPLAINT  Temporal arteritis    HPI  Roseanne Lira is a 74-year-old female with recent PMR flareup, and suspected temporal arteritis.  She is currently taking prednisone.  She is scheduled for temporal artery biopsy.  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     ACTIVE PROBLEMS  Patient Active Problem List   Diagnosis    Alopecia areata    Angioma    Dyslipidemia    Dyspepsia    Gastro-esophageal reflux disease without esophagitis    H/O meningioma of the brain    Hemangioma of skin and subcutaneous tissue    Hypoestrogenism    Infantile idiopathic scoliosis of thoracolumbar region    Intermittent asthma (HHS-HCC)    Labia minora agglutination    Labile hypertension    Left leg weakness    Lichen sclerosus    Lipoma of back    Meningioma (Multi)    Muscle spasm    Myopia with astigmatism and presbyopia    Melanocytic nevi of trunk    Numerous moles    Osteoarthritis of left knee    Osteopenia    Other melanin hyperpigmentation    Other seborrheic keratosis    Paresthesia    Fibromyalgia    PMR (polymyalgia rheumatica) (Multi)    Tenderness of left calf    Postural dizziness    Primary  localized osteoarthrosis of right lower leg    Rash and other nonspecific skin eruption    Reactive airway disease (HHS-HCC)    Seborrhea capitis    SOB (shortness of breath) on exertion    Kidney stone    Urolithiasis    Vitamin D deficiency    Vulvar pruritus    Weakness of both upper extremities    Age related osteoporosis    Allergic contact dermatitis due to cosmetics    Ankle edema    Asymptomatic varicose veins    Autonomic dysfunction    Bilateral carpal tunnel syndrome    Borderline hyperglycemia    Claustrophobia    Abnormal carotid ultrasound    Abnormal CT scan, lung    Acquired scoliosis    Acquired vaginal adhesions    Intertrigo    Lentigines    Visit for screening mammogram    Ductal carcinoma in situ (DCIS) of right breast    Chronic bilateral low back pain without sciatica    Wellness examination    Bilateral hip pain    Depression, recurrent (CMS-HCC)    Aneurysm of vertebral artery (CMS-HCC)    CKD stage 3a, GFR 45-59 ml/min (Multi)    Lumbar radiculitis    Vision changes    Right foot pain    Knee pain, bilateral    Chronic bilateral thoracic back pain    Unilateral primary osteoarthritis, left knee    Arteritis, unspecified (CMS-HCC)        PAST MEDICAL HISTORY  Past Medical History:   Diagnosis Date    Age-related nuclear cataract, left eye 03/25/2017    Age-related nuclear cataract of left eye    Age-related nuclear cataract, right eye 03/25/2017    Age-related nuclear cataract of right eye    Allergy status to unspecified drugs, medicaments and biological substances     History of seasonal allergies    Cortical age-related cataract, left eye 03/25/2017    Cortical age-related cataract of left eye    Cortical age-related cataract, right eye 03/25/2017    Cortical age-related cataract of right eye    Diarrhea, unspecified 09/25/2018    Acute diarrhea    Dizziness and giddiness 06/10/2020    Postural dizziness with presyncope    Elevated blood-pressure reading, without diagnosis of hypertension  10/16/2019    Prehypertension    Encounter for general adult medical examination without abnormal findings 09/14/2020    Preventative health care    Encounter for other preprocedural examination     Preoperative exam for gynecologic surgery    Encounter for screening mammogram for malignant neoplasm of breast 06/03/2013    Visit for screening mammogram    Epigastric pain 01/24/2020    Abdominal pain, acute, epigastric    Headache, unspecified 10/19/2020    Sinus headache    Intraductal carcinoma in situ of unspecified breast 04/01/2015    DCIS (ductal carcinoma in situ) of breast    Leukoplakia of vulva     Lichen sclerosus of female genitalia    Long term (current) use of systemic steroids 08/27/2019    Current chronic use of systemic steroids    Malignant neoplasm of vertebral column (Multi) 09/09/2024    Other chest pain 10/16/2019    Atypical chest pain    Other conditions influencing health status 10/25/2019    Chronic use of steroids    Other disturbances of skin sensation 05/19/2015    Burning sensation    Other malaise 08/23/2018    Malaise and fatigue    Other pericardial effusion (noninflammatory) ([facility]) 04/07/2015    Pericardial effusion    Personal history of malignant neoplasm of bone 07/11/2019    History of malignant neoplasm of bone    Personal history of other diseases of the respiratory system 04/01/2015    History of asthma    Personal history of other diseases of the respiratory system 04/05/2017    History of acute bronchitis    Personal history of other diseases of the respiratory system 01/03/2014    History of upper respiratory infection    Personal history of other specified conditions 03/30/2015    History of abdominal pain    Personal history of urinary (tract) infections 06/23/2015    History of urinary tract infection    Personal history of urinary (tract) infections 04/05/2017    History of acute cystitis    Unspecified visual disturbance 07/07/2020    Visual disturbances    Urinary  tract infection, site not specified 05/13/2021    Acute UTI        SURGICAL HISTORY  Past Surgical History:   Procedure Laterality Date    BREAST LUMPECTOMY Right     with radiation in 2011    BREAST SURGERY  06/03/2013    Breast Surgery    CATARACT EXTRACTION      COLONOSCOPY  06/03/2013    Complete Colonoscopy    CT ANGIO NECK  12/09/2022    CT NECK ANGIO W AND WO IV CONTRAST 12/9/2022 [facility]    CT HEAD ANGIO W AND WO IV CONTRAST  12/09/2022    CT HEAD ANGIO W AND WO IV CONTRAST 12/9/2022 [facility]    MR HEAD ANGIO WO IV CONTRAST  07/27/2020    MR HEAD ANGIO WO IV CONTRAST 7/27/2020 [facility]    MR HEAD ANGIO WO IV CONTRAST  05/18/2017    MR HEAD ANGIO WO IV CONTRAST 5/18/2017 [facility]    MR NECK ANGIO WO IV CONTRAST  07/27/2020    MR NECK ANGIO WO IV CONTRAST 7/27/2020 [facility]    MR NECK ANGIO WO IV CONTRAST  05/18/2017    MR NECK ANGIO WO IV CONTRAST 5/18/2017 [facility]    OTHER SURGICAL HISTORY  06/03/2013    Craniotomy Supratentorial Excision Of Meningioma    OTHER SURGICAL HISTORY  10/19/2020    Cataract surgery    OTHER SURGICAL HISTORY  03/13/2017    Arthrodesis Cervical c2-5 fusion    OTHER SURGICAL HISTORY  03/13/2017    Craniotomy Infratentorial Excision Of Meningioma    SPINE SURGERY  06/03/2013    Spine Repair    TUBAL LIGATION  05/19/2015    Tubal Ligation        ANESTHESIA HISTORY  Denies problems with anesthesia in the past such as PONV, prolonged sedation, awareness, dental damage, aspiration, cardiac arrest, difficult intubation, or unexpected hospital admissions. Denies family history of malignant hyperthermia, or pseudocholinesterase deficiency.     SOCIAL HISTORY  Ever smoker; no alcohol use; medical marijuana THC as a sleep aid and for pain; no recreational drug use.  Patient states that due to her chronic pain it makes it very difficult for her to go for walks, or exercise.  She states she will get shortness of breath  with moderate activities, but denies chest pain.  METS 3.5    FAMILY HISTORY  Family History   Problem Relation Name Age of Onset    Hypertension Mother      Dementia Mother      Hypertension Maternal Grandmother      Kidney disease Maternal Grandmother      Heart attack Maternal Grandfather      Alzheimer's disease Mother's Sister      Breast cancer Mother's Sister  40        ALLERGIES  Allergies   Allergen Reactions    Lovastatin GI Upset    Codeine Nausea Only and Nausea/vomiting    Beclomethasone Unknown    Omeprazole Dermatitis    Hydrocortisone Unknown    Penicillins Itching        MEDICATIONS  No current facility-administered medications for this encounter.    Current Outpatient Medications:     amLODIPine (Norvasc) 2.5 mg tablet, TAKE 1 TABLET (2.5 MG) BY MOUTH 2 TIMES A DAY., Disp: 180 tablet, Rfl: 1    calcium carbonate-vitamin D3 600 mg-20 mcg (800 unit) tablet, Take 1 tablet by mouth once daily., Disp: , Rfl:     cetirizine (ZyrTEC) 10 mg tablet, Take 1 tablet (10 mg) by mouth once daily., Disp: , Rfl:     cholecalciferol (Vitamin D-3) 5,000 Units tablet, Take 1 tablet (5,000 Units) by mouth early in the morning.., Disp: , Rfl:     cyanocobalamin (Vitamin B-12) 100 mcg tablet, Take 1 tablet (100 mcg) by mouth once daily., Disp: , Rfl:     famotidine (Pepcid) 40 mg tablet, Take 1 tablet (40 mg) by mouth once daily at bedtime., Disp: 90 tablet, Rfl: 1    fluticasone (Flonase) 50 mcg/actuation nasal spray, Administer 1 spray into affected nostril(s) once daily as needed., Disp: , Rfl:     furosemide (Lasix) 20 mg tablet, TAKE 1 TABLET BY MOUTH EVERY DAY AS NEEDED FOR EDEMA, Disp: 90 tablet, Rfl: 0    LORazepam (Ativan) 0.5 mg tablet, Take 1 tablet (0.5 mg) by mouth once daily as needed., Disp: , Rfl:     pravastatin (Pravachol) 10 mg tablet, TAKE 1 TABLET BY MOUTH AT BEDTIME, Disp: 90 tablet, Rfl: 1    predniSONE (Deltasone) 20 mg tablet, Take 3 tablets (60 mg) by mouth once daily. Take in the morning with  food.  Do not use NSAIDs while on high-dose prednisone., Disp: 90 tablet, Rfl: 0    venlafaxine XR (Effexor-XR) 150 mg 24 hr capsule, Take 1 capsule (150 mg) by mouth once daily., Disp: , Rfl:     vitamin E mixed 400 unit capsule, Take 1 capsule by mouth once daily., Disp: , Rfl:     albuterol 90 mcg/actuation inhaler, Inhale 2 puffs once daily as needed for wheezing. Q6-8hrs prn sob (Patient not taking: Reported on 10/22/2024), Disp: 18 g, Rfl: 0    clobetasol (Temovate) 0.05 % cream, 1 Application, Disp: , Rfl:     clotrimazole-betamethasone (Lotrisone) cream, 1 Application, Disp: 1 g, Rfl: 3    MAGNESIUM GLUCONATE ORAL, Take 500 mg by mouth once daily., Disp: , Rfl:     triamcinolone (Kenalog) 0.1 % lotion, Apply topically early in the morning.., Disp: , Rfl:     venlafaxine XR (Effexor-XR) 37.5 mg 24 hr capsule, TAKE ONE ALONG WITH 150 MG DAILY, Disp: , Rfl:      REVIEW OF SYSTEMS  Review of Systems   Musculoskeletal:  Positive for arthralgias and joint swelling.   All other systems reviewed and are negative.    STOP BANG: Negative for AFSHAN      PHYSICAL EXAM  Deferred    AIRWAY EXAM  Deferred    VITALS  No vitals taken for telemedicine visit  BMI Readings from Last 1 Encounters:   10/21/24 32.75 kg/m²      BP Readings from Last 4 Encounters:   10/21/24 144/78   10/16/24 117/76   09/09/24 120/78   08/14/24 128/77        LABS  Lab Results   Component Value Date    WBC 8.2 10/16/2024    HGB 13.7 10/16/2024    HCT 43.3 10/16/2024    MCV 86 10/16/2024     10/16/2024      Lab Results   Component Value Date    GLUCOSE 79 10/16/2024    CALCIUM 9.6 10/16/2024     10/16/2024    K 4.5 10/16/2024    CO2 28 10/16/2024     10/16/2024    BUN 24 (H) 10/16/2024    CREATININE 1.17 (H) 10/16/2024      Lab Results   Component Value Date    HGBA1C 5.4 03/27/2024      Lab Results   Component Value Date    CHOL 242 (H) 07/09/2024    CHOL 284 (H) 03/27/2024    CHOL 216 (H) 06/21/2022     Lab Results   Component Value  "Date    HDL 63.0 07/09/2024    HDL 69.7 03/27/2024    HDL 64.0 06/21/2022     Lab Results   Component Value Date    LDLCALC 143 (H) 07/09/2024    LDLCALC 179 (H) 03/27/2024     Lab Results   Component Value Date    TRIG 178 (H) 07/09/2024    TRIG 176 (H) 03/27/2024    TRIG 178 (H) 06/21/2022     No components found for: \"CHOLHDL\"     IMAGING  EKG from 04/10/2024  Indications  Priority: Routine  Not on file     Interpretation Summary    Normal sinus rhythm  Low voltage QRS  Nonspecific T wave abnormality  Abnormal ECG  When compared with ECG of 15-NOV-2022 14:02,      ASSESSMENT/PLAN  Suspected temporal arteritis  Temporal artery biopsy    Preoperative instructions reviewed in detail with patient during this encounter. A copy of these instructions has been unloaded to  [email or web address] along with a copy sent to either home email address or mailed to home address.    This note was created in part upon personal review of patient's medical records.  Speech recognition transcription software was used in the creation of this note. Despite proofreading, several typographical errors might be present that might affect the meaning of the content.       "

## 2024-10-22 NOTE — H&P (VIEW-ONLY)
CPM/PAT Evaluation       Name: Roseanne Lira   /Age: 1950       TELEMEDICINE ENCOUNTER  Patient was interviewed by telephone for preadmission testing perioperative risk assessment prior to surgery.    DATE OF CONSULT: 10/22/2024  REFERRING PROVIDER: Dr. Luis Carlos Barton  SURGERY, DATE, AND LENGTH: Temporal artery biopsy; 10/23/2024; 60 minutes    CHIEF COMPLAINT  Temporal arteritis    HPI  Roseanne Lira is a 74-year-old female with recent PMR flareup, and suspected temporal arteritis.  She is currently taking prednisone.  She is scheduled for temporal artery biopsy.  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     ACTIVE PROBLEMS  Patient Active Problem List   Diagnosis    Alopecia areata    Angioma    Dyslipidemia    Dyspepsia    Gastro-esophageal reflux disease without esophagitis    H/O meningioma of the brain    Hemangioma of skin and subcutaneous tissue    Hypoestrogenism    Infantile idiopathic scoliosis of thoracolumbar region    Intermittent asthma (HHS-HCC)    Labia minora agglutination    Labile hypertension    Left leg weakness    Lichen sclerosus    Lipoma of back    Meningioma (Multi)    Muscle spasm    Myopia with astigmatism and presbyopia    Melanocytic nevi of trunk    Numerous moles    Osteoarthritis of left knee    Osteopenia    Other melanin hyperpigmentation    Other seborrheic keratosis    Paresthesia    Fibromyalgia    PMR (polymyalgia rheumatica) (Multi)    Tenderness of left calf    Postural dizziness    Primary  localized osteoarthrosis of right lower leg    Rash and other nonspecific skin eruption    Reactive airway disease (HHS-HCC)    Seborrhea capitis    SOB (shortness of breath) on exertion    Kidney stone    Urolithiasis    Vitamin D deficiency    Vulvar pruritus    Weakness of both upper extremities    Age related osteoporosis    Allergic contact dermatitis due to cosmetics    Ankle edema    Asymptomatic varicose veins    Autonomic dysfunction    Bilateral carpal tunnel syndrome    Borderline hyperglycemia    Claustrophobia    Abnormal carotid ultrasound    Abnormal CT scan, lung    Acquired scoliosis    Acquired vaginal adhesions    Intertrigo    Lentigines    Visit for screening mammogram    Ductal carcinoma in situ (DCIS) of right breast    Chronic bilateral low back pain without sciatica    Wellness examination    Bilateral hip pain    Depression, recurrent (CMS-HCC)    Aneurysm of vertebral artery (CMS-HCC)    CKD stage 3a, GFR 45-59 ml/min (Multi)    Lumbar radiculitis    Vision changes    Right foot pain    Knee pain, bilateral    Chronic bilateral thoracic back pain    Unilateral primary osteoarthritis, left knee    Arteritis, unspecified (CMS-HCC)        PAST MEDICAL HISTORY  Past Medical History:   Diagnosis Date    Age-related nuclear cataract, left eye 03/25/2017    Age-related nuclear cataract of left eye    Age-related nuclear cataract, right eye 03/25/2017    Age-related nuclear cataract of right eye    Allergy status to unspecified drugs, medicaments and biological substances     History of seasonal allergies    Cortical age-related cataract, left eye 03/25/2017    Cortical age-related cataract of left eye    Cortical age-related cataract, right eye 03/25/2017    Cortical age-related cataract of right eye    Diarrhea, unspecified 09/25/2018    Acute diarrhea    Dizziness and giddiness 06/10/2020    Postural dizziness with presyncope    Elevated blood-pressure reading, without diagnosis of hypertension  10/16/2019    Prehypertension    Encounter for general adult medical examination without abnormal findings 09/14/2020    Preventative health care    Encounter for other preprocedural examination     Preoperative exam for gynecologic surgery    Encounter for screening mammogram for malignant neoplasm of breast 06/03/2013    Visit for screening mammogram    Epigastric pain 01/24/2020    Abdominal pain, acute, epigastric    Headache, unspecified 10/19/2020    Sinus headache    Intraductal carcinoma in situ of unspecified breast 04/01/2015    DCIS (ductal carcinoma in situ) of breast    Leukoplakia of vulva     Lichen sclerosus of female genitalia    Long term (current) use of systemic steroids 08/27/2019    Current chronic use of systemic steroids    Malignant neoplasm of vertebral column (Multi) 09/09/2024    Other chest pain 10/16/2019    Atypical chest pain    Other conditions influencing health status 10/25/2019    Chronic use of steroids    Other disturbances of skin sensation 05/19/2015    Burning sensation    Other malaise 08/23/2018    Malaise and fatigue    Other pericardial effusion (noninflammatory) ([facility]) 04/07/2015    Pericardial effusion    Personal history of malignant neoplasm of bone 07/11/2019    History of malignant neoplasm of bone    Personal history of other diseases of the respiratory system 04/01/2015    History of asthma    Personal history of other diseases of the respiratory system 04/05/2017    History of acute bronchitis    Personal history of other diseases of the respiratory system 01/03/2014    History of upper respiratory infection    Personal history of other specified conditions 03/30/2015    History of abdominal pain    Personal history of urinary (tract) infections 06/23/2015    History of urinary tract infection    Personal history of urinary (tract) infections 04/05/2017    History of acute cystitis    Unspecified visual disturbance 07/07/2020    Visual disturbances    Urinary  tract infection, site not specified 05/13/2021    Acute UTI        SURGICAL HISTORY  Past Surgical History:   Procedure Laterality Date    BREAST LUMPECTOMY Right     with radiation in 2011    BREAST SURGERY  06/03/2013    Breast Surgery    CATARACT EXTRACTION      COLONOSCOPY  06/03/2013    Complete Colonoscopy    CT ANGIO NECK  12/09/2022    CT NECK ANGIO W AND WO IV CONTRAST 12/9/2022 [facility]    CT HEAD ANGIO W AND WO IV CONTRAST  12/09/2022    CT HEAD ANGIO W AND WO IV CONTRAST 12/9/2022 [facility]    MR HEAD ANGIO WO IV CONTRAST  07/27/2020    MR HEAD ANGIO WO IV CONTRAST 7/27/2020 [facility]    MR HEAD ANGIO WO IV CONTRAST  05/18/2017    MR HEAD ANGIO WO IV CONTRAST 5/18/2017 [facility]    MR NECK ANGIO WO IV CONTRAST  07/27/2020    MR NECK ANGIO WO IV CONTRAST 7/27/2020 [facility]    MR NECK ANGIO WO IV CONTRAST  05/18/2017    MR NECK ANGIO WO IV CONTRAST 5/18/2017 [facility]    OTHER SURGICAL HISTORY  06/03/2013    Craniotomy Supratentorial Excision Of Meningioma    OTHER SURGICAL HISTORY  10/19/2020    Cataract surgery    OTHER SURGICAL HISTORY  03/13/2017    Arthrodesis Cervical c2-5 fusion    OTHER SURGICAL HISTORY  03/13/2017    Craniotomy Infratentorial Excision Of Meningioma    SPINE SURGERY  06/03/2013    Spine Repair    TUBAL LIGATION  05/19/2015    Tubal Ligation        ANESTHESIA HISTORY  Denies problems with anesthesia in the past such as PONV, prolonged sedation, awareness, dental damage, aspiration, cardiac arrest, difficult intubation, or unexpected hospital admissions. Denies family history of malignant hyperthermia, or pseudocholinesterase deficiency.     SOCIAL HISTORY  Ever smoker; no alcohol use; medical marijuana THC as a sleep aid and for pain; no recreational drug use.  Patient states that due to her chronic pain it makes it very difficult for her to go for walks, or exercise.  She states she will get shortness of breath  with moderate activities, but denies chest pain.  METS 3.5    FAMILY HISTORY  Family History   Problem Relation Name Age of Onset    Hypertension Mother      Dementia Mother      Hypertension Maternal Grandmother      Kidney disease Maternal Grandmother      Heart attack Maternal Grandfather      Alzheimer's disease Mother's Sister      Breast cancer Mother's Sister  40        ALLERGIES  Allergies   Allergen Reactions    Lovastatin GI Upset    Codeine Nausea Only and Nausea/vomiting    Beclomethasone Unknown    Omeprazole Dermatitis    Hydrocortisone Unknown    Penicillins Itching        MEDICATIONS  No current facility-administered medications for this encounter.    Current Outpatient Medications:     amLODIPine (Norvasc) 2.5 mg tablet, TAKE 1 TABLET (2.5 MG) BY MOUTH 2 TIMES A DAY., Disp: 180 tablet, Rfl: 1    calcium carbonate-vitamin D3 600 mg-20 mcg (800 unit) tablet, Take 1 tablet by mouth once daily., Disp: , Rfl:     cetirizine (ZyrTEC) 10 mg tablet, Take 1 tablet (10 mg) by mouth once daily., Disp: , Rfl:     cholecalciferol (Vitamin D-3) 5,000 Units tablet, Take 1 tablet (5,000 Units) by mouth early in the morning.., Disp: , Rfl:     cyanocobalamin (Vitamin B-12) 100 mcg tablet, Take 1 tablet (100 mcg) by mouth once daily., Disp: , Rfl:     famotidine (Pepcid) 40 mg tablet, Take 1 tablet (40 mg) by mouth once daily at bedtime., Disp: 90 tablet, Rfl: 1    fluticasone (Flonase) 50 mcg/actuation nasal spray, Administer 1 spray into affected nostril(s) once daily as needed., Disp: , Rfl:     furosemide (Lasix) 20 mg tablet, TAKE 1 TABLET BY MOUTH EVERY DAY AS NEEDED FOR EDEMA, Disp: 90 tablet, Rfl: 0    LORazepam (Ativan) 0.5 mg tablet, Take 1 tablet (0.5 mg) by mouth once daily as needed., Disp: , Rfl:     pravastatin (Pravachol) 10 mg tablet, TAKE 1 TABLET BY MOUTH AT BEDTIME, Disp: 90 tablet, Rfl: 1    predniSONE (Deltasone) 20 mg tablet, Take 3 tablets (60 mg) by mouth once daily. Take in the morning with  food.  Do not use NSAIDs while on high-dose prednisone., Disp: 90 tablet, Rfl: 0    venlafaxine XR (Effexor-XR) 150 mg 24 hr capsule, Take 1 capsule (150 mg) by mouth once daily., Disp: , Rfl:     vitamin E mixed 400 unit capsule, Take 1 capsule by mouth once daily., Disp: , Rfl:     albuterol 90 mcg/actuation inhaler, Inhale 2 puffs once daily as needed for wheezing. Q6-8hrs prn sob (Patient not taking: Reported on 10/22/2024), Disp: 18 g, Rfl: 0    clobetasol (Temovate) 0.05 % cream, 1 Application, Disp: , Rfl:     clotrimazole-betamethasone (Lotrisone) cream, 1 Application, Disp: 1 g, Rfl: 3    MAGNESIUM GLUCONATE ORAL, Take 500 mg by mouth once daily., Disp: , Rfl:     triamcinolone (Kenalog) 0.1 % lotion, Apply topically early in the morning.., Disp: , Rfl:     venlafaxine XR (Effexor-XR) 37.5 mg 24 hr capsule, TAKE ONE ALONG WITH 150 MG DAILY, Disp: , Rfl:      REVIEW OF SYSTEMS  Review of Systems   Musculoskeletal:  Positive for arthralgias and joint swelling.   All other systems reviewed and are negative.    STOP BANG: Negative for AFSHAN      PHYSICAL EXAM  Deferred    AIRWAY EXAM  Deferred    VITALS  No vitals taken for telemedicine visit  BMI Readings from Last 1 Encounters:   10/21/24 32.75 kg/m²      BP Readings from Last 4 Encounters:   10/21/24 144/78   10/16/24 117/76   09/09/24 120/78   08/14/24 128/77        LABS  Lab Results   Component Value Date    WBC 8.2 10/16/2024    HGB 13.7 10/16/2024    HCT 43.3 10/16/2024    MCV 86 10/16/2024     10/16/2024      Lab Results   Component Value Date    GLUCOSE 79 10/16/2024    CALCIUM 9.6 10/16/2024     10/16/2024    K 4.5 10/16/2024    CO2 28 10/16/2024     10/16/2024    BUN 24 (H) 10/16/2024    CREATININE 1.17 (H) 10/16/2024      Lab Results   Component Value Date    HGBA1C 5.4 03/27/2024      Lab Results   Component Value Date    CHOL 242 (H) 07/09/2024    CHOL 284 (H) 03/27/2024    CHOL 216 (H) 06/21/2022     Lab Results   Component Value  "Date    HDL 63.0 07/09/2024    HDL 69.7 03/27/2024    HDL 64.0 06/21/2022     Lab Results   Component Value Date    LDLCALC 143 (H) 07/09/2024    LDLCALC 179 (H) 03/27/2024     Lab Results   Component Value Date    TRIG 178 (H) 07/09/2024    TRIG 176 (H) 03/27/2024    TRIG 178 (H) 06/21/2022     No components found for: \"CHOLHDL\"     IMAGING  EKG from 04/10/2024  Indications  Priority: Routine  Not on file     Interpretation Summary    Normal sinus rhythm  Low voltage QRS  Nonspecific T wave abnormality  Abnormal ECG  When compared with ECG of 15-NOV-2022 14:02,      ASSESSMENT/PLAN  Suspected temporal arteritis  Temporal artery biopsy    Preoperative instructions reviewed in detail with patient during this encounter. A copy of these instructions has been unloaded to  [email or web address] along with a copy sent to either home email address or mailed to home address.    This note was created in part upon personal review of patient's medical records.  Speech recognition transcription software was used in the creation of this note. Despite proofreading, several typographical errors might be present that might affect the meaning of the content.       "

## 2024-10-23 ENCOUNTER — APPOINTMENT (OUTPATIENT)
Dept: RHEUMATOLOGY | Facility: CLINIC | Age: 74
End: 2024-10-23
Payer: MEDICARE

## 2024-10-23 ENCOUNTER — HOSPITAL ENCOUNTER (OUTPATIENT)
Facility: CLINIC | Age: 74
Setting detail: OUTPATIENT SURGERY
Discharge: HOME | End: 2024-10-23
Attending: SURGERY | Admitting: SURGERY
Payer: MEDICARE

## 2024-10-23 ENCOUNTER — ANESTHESIA (OUTPATIENT)
Dept: OPERATING ROOM | Facility: CLINIC | Age: 74
End: 2024-10-23
Payer: MEDICARE

## 2024-10-23 VITALS
DIASTOLIC BLOOD PRESSURE: 77 MMHG | HEART RATE: 59 BPM | HEIGHT: 64 IN | BODY MASS INDEX: 32.48 KG/M2 | TEMPERATURE: 97.9 F | RESPIRATION RATE: 16 BRPM | WEIGHT: 190.26 LBS | SYSTOLIC BLOOD PRESSURE: 154 MMHG | OXYGEN SATURATION: 95 %

## 2024-10-23 DIAGNOSIS — I77.6 ARTERITIS, UNSPECIFIED (CMS-HCC): Primary | ICD-10-CM

## 2024-10-23 PROCEDURE — 3700000002 HC GENERAL ANESTHESIA TIME - EACH INCREMENTAL 1 MINUTE: Performed by: SURGERY

## 2024-10-23 PROCEDURE — 37609 LIGATION/BX TEMPORAL ARTERY: CPT | Performed by: SURGERY

## 2024-10-23 PROCEDURE — 7100000010 HC PHASE TWO TIME - EACH INCREMENTAL 1 MINUTE: Performed by: SURGERY

## 2024-10-23 PROCEDURE — 7100000001 HC RECOVERY ROOM TIME - INITIAL BASE CHARGE: Performed by: SURGERY

## 2024-10-23 PROCEDURE — 99100 ANES PT EXTEME AGE<1 YR&>70: CPT | Performed by: ANESTHESIOLOGY

## 2024-10-23 PROCEDURE — 0753T DGTZ GLS MCRSCP SLD LEVEL IV: CPT | Mod: TC,SUR | Performed by: SURGERY

## 2024-10-23 PROCEDURE — A37609 PR TEMPORAL ARTERY LIGATN OR BX

## 2024-10-23 PROCEDURE — 3600000007 HC OR TIME - EACH INCREMENTAL 1 MINUTE - PROCEDURE LEVEL TWO: Performed by: SURGERY

## 2024-10-23 PROCEDURE — 7100000009 HC PHASE TWO TIME - INITIAL BASE CHARGE: Performed by: SURGERY

## 2024-10-23 PROCEDURE — A37609 PR TEMPORAL ARTERY LIGATN OR BX: Performed by: ANESTHESIOLOGY

## 2024-10-23 PROCEDURE — 2500000004 HC RX 250 GENERAL PHARMACY W/ HCPCS (ALT 636 FOR OP/ED)

## 2024-10-23 PROCEDURE — 2500000001 HC RX 250 WO HCPCS SELF ADMINISTERED DRUGS (ALT 637 FOR MEDICARE OP): Performed by: ANESTHESIOLOGY

## 2024-10-23 PROCEDURE — 2500000004 HC RX 250 GENERAL PHARMACY W/ HCPCS (ALT 636 FOR OP/ED): Performed by: SURGERY

## 2024-10-23 PROCEDURE — 3700000001 HC GENERAL ANESTHESIA TIME - INITIAL BASE CHARGE: Performed by: SURGERY

## 2024-10-23 PROCEDURE — 3600000002 HC OR TIME - INITIAL BASE CHARGE - PROCEDURE LEVEL TWO: Performed by: SURGERY

## 2024-10-23 PROCEDURE — 2500000005 HC RX 250 GENERAL PHARMACY W/O HCPCS: Performed by: ANESTHESIOLOGY

## 2024-10-23 PROCEDURE — 2500000005 HC RX 250 GENERAL PHARMACY W/O HCPCS: Performed by: SURGERY

## 2024-10-23 PROCEDURE — 7100000002 HC RECOVERY ROOM TIME - EACH INCREMENTAL 1 MINUTE: Performed by: SURGERY

## 2024-10-23 RX ORDER — ONDANSETRON HYDROCHLORIDE 2 MG/ML
INJECTION, SOLUTION INTRAVENOUS AS NEEDED
Status: DISCONTINUED | OUTPATIENT
Start: 2024-10-23 | End: 2024-10-23

## 2024-10-23 RX ORDER — ONDANSETRON HYDROCHLORIDE 2 MG/ML
4 INJECTION, SOLUTION INTRAVENOUS ONCE AS NEEDED
Status: DISCONTINUED | OUTPATIENT
Start: 2024-10-23 | End: 2024-10-23 | Stop reason: HOSPADM

## 2024-10-23 RX ORDER — FENTANYL CITRATE 50 UG/ML
50 INJECTION, SOLUTION INTRAMUSCULAR; INTRAVENOUS EVERY 5 MIN PRN
Status: DISCONTINUED | OUTPATIENT
Start: 2024-10-23 | End: 2024-10-23 | Stop reason: HOSPADM

## 2024-10-23 RX ORDER — IBUPROFEN 600 MG/1
600 TABLET ORAL EVERY 6 HOURS PRN
Qty: 20 TABLET | Refills: 0 | Status: SHIPPED | OUTPATIENT
Start: 2024-10-23

## 2024-10-23 RX ORDER — SODIUM CHLORIDE 0.9 % (FLUSH) 0.9 %
SYRINGE (ML) INJECTION AS NEEDED
Status: DISCONTINUED | OUTPATIENT
Start: 2024-10-23 | End: 2024-10-23

## 2024-10-23 RX ORDER — LIDOCAINE HYDROCHLORIDE 20 MG/ML
INJECTION, SOLUTION INFILTRATION; PERINEURAL AS NEEDED
Status: DISCONTINUED | OUTPATIENT
Start: 2024-10-23 | End: 2024-10-23

## 2024-10-23 RX ORDER — LIDOCAINE IN NACL,ISO-OSMOT/PF 30 MG/3 ML
0.1 SYRINGE (ML) INJECTION ONCE
Status: DISCONTINUED | OUTPATIENT
Start: 2024-10-23 | End: 2024-10-23 | Stop reason: HOSPADM

## 2024-10-23 RX ORDER — FENTANYL CITRATE 50 UG/ML
25 INJECTION, SOLUTION INTRAMUSCULAR; INTRAVENOUS EVERY 5 MIN PRN
Status: DISCONTINUED | OUTPATIENT
Start: 2024-10-23 | End: 2024-10-23 | Stop reason: HOSPADM

## 2024-10-23 RX ORDER — OXYCODONE HYDROCHLORIDE 5 MG/1
5 TABLET ORAL EVERY 6 HOURS PRN
Qty: 6 TABLET | Refills: 0 | Status: SHIPPED | OUTPATIENT
Start: 2024-10-23

## 2024-10-23 RX ORDER — CEFAZOLIN 1 G/1
INJECTION, POWDER, FOR SOLUTION INTRAVENOUS AS NEEDED
Status: DISCONTINUED | OUTPATIENT
Start: 2024-10-23 | End: 2024-10-23

## 2024-10-23 RX ORDER — LIDOCAINE HYDROCHLORIDE 10 MG/ML
INJECTION, SOLUTION INFILTRATION; PERINEURAL AS NEEDED
Status: DISCONTINUED | OUTPATIENT
Start: 2024-10-23 | End: 2024-10-23 | Stop reason: HOSPADM

## 2024-10-23 RX ORDER — OXYCODONE HYDROCHLORIDE 5 MG/1
5 TABLET ORAL EVERY 4 HOURS PRN
Status: DISCONTINUED | OUTPATIENT
Start: 2024-10-23 | End: 2024-10-23 | Stop reason: HOSPADM

## 2024-10-23 RX ORDER — LABETALOL HYDROCHLORIDE 5 MG/ML
5 INJECTION, SOLUTION INTRAVENOUS ONCE AS NEEDED
Status: DISCONTINUED | OUTPATIENT
Start: 2024-10-23 | End: 2024-10-23 | Stop reason: HOSPADM

## 2024-10-23 RX ORDER — SODIUM CHLORIDE 0.9 G/100ML
IRRIGANT IRRIGATION AS NEEDED
Status: DISCONTINUED | OUTPATIENT
Start: 2024-10-23 | End: 2024-10-23 | Stop reason: HOSPADM

## 2024-10-23 RX ORDER — ACETAMINOPHEN 325 MG/1
650 TABLET ORAL EVERY 4 HOURS PRN
Status: DISCONTINUED | OUTPATIENT
Start: 2024-10-23 | End: 2024-10-23 | Stop reason: HOSPADM

## 2024-10-23 RX ORDER — PROPOFOL 10 MG/ML
INJECTION, EMULSION INTRAVENOUS AS NEEDED
Status: DISCONTINUED | OUTPATIENT
Start: 2024-10-23 | End: 2024-10-23

## 2024-10-23 RX ORDER — FENTANYL CITRATE 50 UG/ML
INJECTION, SOLUTION INTRAMUSCULAR; INTRAVENOUS AS NEEDED
Status: DISCONTINUED | OUTPATIENT
Start: 2024-10-23 | End: 2024-10-23

## 2024-10-23 RX ORDER — MIDAZOLAM HYDROCHLORIDE 1 MG/ML
INJECTION, SOLUTION INTRAMUSCULAR; INTRAVENOUS AS NEEDED
Status: DISCONTINUED | OUTPATIENT
Start: 2024-10-23 | End: 2024-10-23

## 2024-10-23 SDOH — HEALTH STABILITY: MENTAL HEALTH: CURRENT SMOKER: 0

## 2024-10-23 ASSESSMENT — PAIN DESCRIPTION - LOCATION: LOCATION: OTHER (COMMENT)

## 2024-10-23 ASSESSMENT — COLUMBIA-SUICIDE SEVERITY RATING SCALE - C-SSRS
2. HAVE YOU ACTUALLY HAD ANY THOUGHTS OF KILLING YOURSELF?: NO
6. HAVE YOU EVER DONE ANYTHING, STARTED TO DO ANYTHING, OR PREPARED TO DO ANYTHING TO END YOUR LIFE?: NO
1. IN THE PAST MONTH, HAVE YOU WISHED YOU WERE DEAD OR WISHED YOU COULD GO TO SLEEP AND NOT WAKE UP?: NO

## 2024-10-23 ASSESSMENT — PAIN - FUNCTIONAL ASSESSMENT
PAIN_FUNCTIONAL_ASSESSMENT: 0-10

## 2024-10-23 ASSESSMENT — PAIN SCALES - GENERAL
PAIN_LEVEL: 0
PAINLEVEL_OUTOF10: 0 - NO PAIN
PAINLEVEL_OUTOF10: 3
PAINLEVEL_OUTOF10: 0 - NO PAIN
PAINLEVEL_OUTOF10: 2
PAINLEVEL_OUTOF10: 3

## 2024-10-23 ASSESSMENT — PAIN DESCRIPTION - ORIENTATION: ORIENTATION: RIGHT

## 2024-10-23 NOTE — PROGRESS NOTES
Subjective   Patient ID: Roseanne Lira is a 74 y.o. female who presents for No chief complaint on file..  HPI: Female with a history of polymyalgia rheumatica in remission; last saw Dr. 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  Due to bilateral temporal pain lasting a few seconds happening several times a day, feelings of worsening vision, I sent her to ophthalmology, started her on prednisone 60 mg daily, and arranged for bilateral temporal biopsy.  Ophthalmology saw her October 22, 2024, Dr Glynn Guerrero, and saw no signs of GCA ophthalmologically, Dr. Barton surgery did bilateral temporal artery biopsies October 23, 2024    She comes in for follow-up October 24, 2024, she has been on prednisone 60 since October 17, 2024.  She has pain around the surgical site, but otherwise feels okay.  Her joint pain has resolved on prednisone 60    Historical labs: CCP neg, RF neg    7/2024 labs:  WBC normal, Hgb normal, platelets normal  Cr H to 1.0, ALP/AST/ALT/albumin/protein normal     Infectious: 2024  Hep B Core total neg, Hep B S Ag  neg, Hep C Ab neg,  T spot neg    Imaging: 10/16/2024-    Temporal US  Right Carotid: Extremely tortuous temporal artery noted. Vessel appears patent. Size and peak systoloc velocities are as follows:  Proximal- 2.4 mm, 53 cm/sec  Mid-1.6 mm, 82 cm/sec  Distal- 1.0 mm, 43 cm/sec.  Left Carotid: Extremely tortuous temporal artery noted. Vessel appears patent. Size and peak systoloc velocities are as follows:  Proximal- 1.5 mm, 76 cm/sec  Mid- 1.6 mm, 54 cm/sec  Distal- 1.6 mm, 70 cm/sec.      Rheum hx per patient:   -Dx around 2019  -Was put on pred 15 ,and brought her off over 11 months  -Around 2021, patient sxs were intense pain; she does not exactly remember where; She had pain in shoulders, coming down arms, legs also hurt  -Occasionally, patient has aches on both shoulders  -Her arms get very tried washing her hair,     Has AM stiffness just for a few min. Has lower back pain for a few min      Rheumatology specific review of systems  Regular joint pain, morning stiffness>30 min, fevers , chills, unintentional weight loss, rashes, alopecia, mouth sores, nasal ulcers, malar rash,  Raynauds, morning stiffness in back>30 min, dry eyes, dry mouth, blood clots, recurrent miscarriages, rheum fam hx, uveitis, blood or mucus in stool     Chronic pain specific review of systems   widespread pain , widespread tenderness, brain fog, depression/anxiety, migraines or tension headaches, IBS or heartburn symptoms, irritable or overactive bladder, pelvic pain ,TMJ pain,poor sleep, fatigue    Objective   There were no vitals taken for this visit.      Physical Exam  Constitutional: Alert and in no acute distress. Well developed, well nourished  Head and Face: Head and face: Normal.    Cardiovascular: Heart rate and rhythm were normal, normal S1 and S2. No peripheral edema.   Pulmonary: No respiratory distress. Clear bilateral breath sounds.  Musculoskeletal: no synovitis througout, No TTP over temporal arteries, surgical scars  on b.l tempes cdi  Skin: Normal skin color and pigmentation, normal skin turgor, and no rash.    Psychiatric: Judgment and insight: Intact. Mood and affect: Normal.     Lab Results   Component Value Date    WBC 8.2 10/16/2024    HGB 13.7 10/16/2024    HCT 43.3 10/16/2024     10/16/2024    ALT 10 10/16/2024    AST 14 10/16/2024    CREATININE 1.17 (H) 10/16/2024          Lab Results   Component Value Date    SEDRATE 14 10/16/2024    CRP 1.81 (H) 10/16/2024   \\            There is currently no information documented on the homunculus. Go to the Rheumatology activity and complete the homunculus joint exam.      === 06/13/24 ===    DEXA BONE DENSITY    - Impression -  DEXA:  According to World Health Organization criteria,  classification is low bone mass (osteopenia)    Followup recommended in two years or sooner as clinically warranted.    All images and detailed analysis are available on the  Radiology  PACS.    MACRO:  None    Signed by: Ever Bains 6/13/2024 12:56 PM  Dictation workstation:   [id number]    Assessment/Plan:  #PMR  -I reviewed Dr. Antunez's records that patient brought with her on June 2024  -in remission, but still has subjectively weak arms  -Symptomatically, still in remission.  Baseline ESR and CRP done 7/2024 when in remission: sed rate normal at 22, CRP elevated to 1.6  -Repeat labs done October 2024, sed rate normal but CRP elevated to 1.8  -Patient counseled on symptoms of giant cell arteritis and instructed to go to the ED immediately ([facility] is preferable) if they get new vision changes or severe headache, they were instructed that they should tell the ED physician that their rheumatologist has told them that they may be at risk for giant cell arteritis and high dose steroids should be started while they are working it up.  -Has chronic vision changes; per patient, her neurologist Dr. Robledo does not think this is related to her migraine.  Referral placed to  neuro-ophthalmology Dr. Leal June 2024; she has not established yet    #Concern for GCA, vision threatening  -New temporal headaches occurring several times a day, left worse than right, with unchanged MRI, since July 2024.  Vision changes since the end of September 2024  -Ophthalmology saw her October 22, 2024, no active signs of GCA  -Bilateral tabs done October 23, 2024  -Hepatitis and TB serologies neg October 2024  -Spoke with reading vascular surgeon  about temporal artery ultrasound on October 17, 2024; had tortuous temporal arteries, and potentially a soft halo sign; he said that the temporal artery ultrasound was not definitive for this patient  -Patient should stay on prednisone 60 mg daily, started October 17, 2024, until we have biopsy results back-1 month of prednisone 60 mg daily rx  October 2024  -Biopsies are negative, will stop prednisone.  If joint pain is significantly worse off prednisone, could consider doing PMR dosing again of prednisone.  -Patient does have known atherosclerosis, if bilateral biopsies are negative, she can stop prednisone continue following up with neurology and ophthalmology about her headache and intermittent vision changes  -Recommended that patient follow-up with her vascular surgeon, as they had wanted to repeat carotid imaging in around a year, and last saw her around June 2023    #OA  -Sees orthopedics, has a right knee replacement and is pending a left knee replacement  -Sees orthospine for her osteoarthritis  -Requested a referral for acupuncture as this has helped in the past; referral placed June 2024 but patient counseled to call and see if her insurance covers it, and if not, how much her out-of-pocket cost will be    #Osteopenia  -on 6.2024 dexa- repeat 6.2026  -continue vit d 5000 units daily   -recommended eating extra ca in diet (patient with hx of kidney stones)  -walk as tolerated    Patient counseled to seek medical care if any new or worsening  symptoms, urgently if needed.      Note will be sent to primary care doctor     Return to clinic in 2 weeks, can repeat esr and crp then    Dragon dictation software was used to dictate this note. Errors may have occurred during dictation that was not intended by the user.

## 2024-10-23 NOTE — ANESTHESIA PREPROCEDURE EVALUATION
Patient: Roseanne Lira    Procedure Information       Date/Time: 10/23/24 1130    Procedure: Biopsy Temporal Artery (Bilateral: Head)    Location: [facility] OR 01 / Virtual [facility]    Surgeons: Luis Carlos Barton MD            Relevant Problems   Anesthesia (within normal limits)      Cardiac   (+) Labile hypertension      Pulmonary   (+) Intermittent asthma (HHS-HCC)   (+) SOB (shortness of breath) on exertion      Neuro   (+) Bilateral carpal tunnel syndrome   (+) Depression, recurrent (CMS-HCC)   (+) Lumbar radiculitis      GI   (+) Gastro-esophageal reflux disease without esophagitis      /Renal   (+) Kidney stone      Musculoskeletal   (+) Acquired scoliosis   (+) Bilateral carpal tunnel syndrome   (+) Chronic bilateral low back pain without sciatica   (+) Fibromyalgia   (+) Infantile idiopathic scoliosis of thoracolumbar region   (+) Osteoarthritis of left knee   (+) Primary localized osteoarthrosis of right lower leg   (+) Unilateral primary osteoarthritis, left knee      Skin   (+) Rash and other nonspecific skin eruption       Clinical information reviewed:   Tobacco  Allergies  Meds  Problems  Med Hx  Surg Hx  OB Status    Fam Hx  Soc Hx        NPO Detail:  NPO/Void Status  Date of Last Liquid: 10/23/24  Time of Last Liquid: 0900  Date of Last Solid: 10/22/24  Time of Last Solid: 2300  Last Intake Type: Clear fluids  Time of Last Void: 0900         Physical Exam    Airway  Mallampati: II  TM distance: >3 FB  Neck ROM: full     Cardiovascular - normal exam     Dental - normal exam     Pulmonary - normal exam     Abdominal - normal exam         Anesthesia Plan    History of general anesthesia?: yes  History of complications of general anesthesia?: no    ASA 3     general and MAC     The patient is not a current smoker.    Anesthetic plan and risks discussed with patient.    Plan discussed with CAA.

## 2024-10-23 NOTE — ANESTHESIA POSTPROCEDURE EVALUATION
Patient: Roseanne Lira    Procedure Summary       Date: 10/23/24 Room / Location: [facility] [place] [facility] OR 01 / Virtual [facility] [place] [facility] OR    Anesthesia Start: 1139 Anesthesia Stop: 1245    Procedure: Biopsy Temporal Artery (Bilateral: Head) Diagnosis:       Arteritis, unspecified (CMS-HCC)      (Arteritis, unspecified (CMS-HCC) [I77.6])    Surgeons: Luis Carlos Barton MD Responsible Provider: Eloy Meneses MD    Anesthesia Type: general ASA Status: 3            Anesthesia Type: general    Vitals Value Taken Time    87 10/23/24 1245   Temp 36.0 10/23/24 1245   Pulse 63 10/23/24 1245   Resp 12 10/23/24 1245   SpO2 96 10/23/24 1245       Anesthesia Post Evaluation    Patient location during evaluation: PACU  Patient participation: complete - patient participated  Level of consciousness: awake and alert  Pain score: 0  Pain management: adequate  Airway patency: patent  Cardiovascular status: acceptable  Respiratory status: acceptable and face mask  Hydration status: acceptable  Postoperative Nausea and Vomiting: none        There were no known notable events for this encounter.

## 2024-10-23 NOTE — ANESTHESIA PROCEDURE NOTES
Airway  Date/Time: 10/23/2024 11:53 AM  Urgency: elective    Airway not difficult    Staffing  Performed: CAA   Authorized by: Eloy Meneses MD    Performed by: JULIAN Patel  Patient location during procedure: OR    Indications and Patient Condition  Indications for airway management: anesthesia  Sedation level: deep  Preoxygenated: yes  Mask difficulty assessment: 0 - not attempted    Final Airway Details  Final airway type: supraglottic airway      Successful airway: Size 4     Number of attempts at approach: 1

## 2024-10-23 NOTE — OP NOTE
Biopsy Temporal Artery (B) Operative Note     Date: 10/23/2024  OR Location: [facility] [place] [facility] OR    Name: Roseanne Lira, : 1950, Age: 74 y.o., MRN: [id number], Sex: female    Diagnosis  Pre-op Diagnosis      * Arteritis, unspecified (CMS-HCC) [I77.6] Post-op Diagnosis     * Arteritis, unspecified (CMS-HCC) [I77.6]     Procedures  Biopsy Temporal Artery  91702 - [place] LIGATION/BIOPSY TEMPORAL ARTERY    Bilateral  Surgeons      * Luis Carlos Barton - Primary    Resident/Fellow/Other Assistant:  Surgeons and Role:  * No surgeons found with a matching role *  PGY 5  Procedure Summary  Anesthesia: General  ASA: III  Anesthesia Staff: Anesthesiologist: Eloy Meneses MD  C-AA: JULIAN Patel  Estimated Blood Loss: 10mL  Intra-op Medications:   Administrations occurring from 1130 to 1230 on 10/23/24:   Medication Name Total Dose   sodium chloride 0.9 % irrigation solution 250 mL   lidocaine (Xylocaine) 10 mg/mL (1 %) injection 5 mL   ceFAZolin (Ancef) vial 1 g 2 g   dexAMETHasone (Decadron) 4 mg/mL 4 mg   fentaNYL PF 0.05 mg/mL 50 mcg   lidocaine (Xylocaine) injection 2 % 80 mg   midazolam (Versed) 1 mg/1 mL 2 mg   propofol (Diprivan) injection 10 mg/mL 200 mg   sodium chloride 0.9 % flush 40 mL              Anesthesia Record               Intraprocedure I/O Totals       None           Specimen:   ID Type Source Tests Collected by Time   1 : left temporal artery biopsy 21mm Tissue TEMPORAL ARTERY BIOPSY SURGICAL PATHOLOGY EXAM Luis Carlos Barton MD 10/23/2024 1211   2 : right temporal artery biopsy 16mm Tissue TEMPORAL ARTERY BIOPSY SURGICAL PATHOLOGY EXAM Luis Carlos Barton MD 10/23/2024 1213        Staff:   Circulator: Mariola  Scrub Person: Maria Esther         Drains and/or Catheters: * None in log *    Tourniquet Times:         Implants:     Findings: Serpiginous superficial temporal arteries.  Left side 22 mm specimen.  Right side 16mm specimen    Indications: Roseanne Lira is an 74 y.o. female who is having surgery  for Arteritis, unspecified (CMS-HCC) [I77.6].  Request from rheumatology for bilateral temporal artery biopsy.    The patient was seen in the preoperative area. The risks, benefits, complications, treatment options, non-operative alternatives, expected recovery and outcomes were discussed with the patient. The possibilities of reaction to medication, pulmonary aspiration, injury to surrounding structures, bleeding, recurrent infection, the need for additional procedures, failure to diagnose a condition, and creating a complication requiring transfusion or operation were discussed with the patient. The patient concurred with the proposed plan, giving informed consent.  The site of surgery was properly noted/marked if necessary per policy. The patient has been actively warmed in preoperative area. Preoperative antibiotics have been ordered and given within 1 hours of incision. Venous thrombosis prophylaxis have been ordered including bilateral sequential compression devices    Procedure Details: Patient consented for surgery.  Risks and benefits detailed in the office and consent was obtained.  She is brought to the OR placed supine.  Timeout was performed confirm patient procedure and laterality.  Antibiotics were given general anesthesia ministered through an LMA tube.  We prepped and draped both preauricular areas sterilely.  We karmen lines and then injected local anesthetic.  Made longitudinal incision with scalpel.  Subcutaneous flaps were raised.  The investing fascia was incised and a very serpiginous superficial temporal artery bruit was dissected away from the surrounding tissues.  Proximal distal control was obtained with 3-0 silk ties.  Left side specimen was 22 mm in the right side specimen was 16 mm.  Wounds were irrigated and noted to be hemostatic.  We closed the deeper layer with 3-0 Vicryl and skin with running 4-0 Monocryl.  Dermabond was applied the patient went to the recovery room in satisfactory  condition.      Complications:  None; patient tolerated the procedure well.    Disposition: PACU - hemodynamically stable.  Condition: stable         Additional Details:     Attending Attestation: I was present for the entire procedure.    Luis Carlos Barton  Phone Number: [phone]

## 2024-10-24 ENCOUNTER — APPOINTMENT (OUTPATIENT)
Dept: ORTHOPEDIC SURGERY | Facility: CLINIC | Age: 74
End: 2024-10-24
Payer: MEDICARE

## 2024-10-24 ENCOUNTER — OFFICE VISIT (OUTPATIENT)
Dept: RHEUMATOLOGY | Facility: CLINIC | Age: 74
End: 2024-10-24
Payer: MEDICARE

## 2024-10-24 VITALS — DIASTOLIC BLOOD PRESSURE: 78 MMHG | SYSTOLIC BLOOD PRESSURE: 134 MMHG | HEART RATE: 53 BPM

## 2024-10-24 DIAGNOSIS — H53.9 VISION CHANGES: ICD-10-CM

## 2024-10-24 DIAGNOSIS — M35.3 PMR (POLYMYALGIA RHEUMATICA) (MULTI): ICD-10-CM

## 2024-10-24 PROCEDURE — 3078F DIAST BP <80 MM HG: CPT | Performed by: STUDENT IN AN ORGANIZED HEALTH CARE EDUCATION/TRAINING PROGRAM

## 2024-10-24 PROCEDURE — 1159F MED LIST DOCD IN RCRD: CPT | Performed by: STUDENT IN AN ORGANIZED HEALTH CARE EDUCATION/TRAINING PROGRAM

## 2024-10-24 PROCEDURE — 1036F TOBACCO NON-USER: CPT | Performed by: STUDENT IN AN ORGANIZED HEALTH CARE EDUCATION/TRAINING PROGRAM

## 2024-10-24 PROCEDURE — 3075F SYST BP GE 130 - 139MM HG: CPT | Performed by: STUDENT IN AN ORGANIZED HEALTH CARE EDUCATION/TRAINING PROGRAM

## 2024-10-24 PROCEDURE — 99215 OFFICE O/P EST HI 40 MIN: CPT | Performed by: STUDENT IN AN ORGANIZED HEALTH CARE EDUCATION/TRAINING PROGRAM

## 2024-10-24 RX ORDER — PREDNISONE 20 MG/1
60 TABLET ORAL DAILY
Qty: 90 TABLET | Refills: 0 | Status: SHIPPED | OUTPATIENT
Start: 2024-10-24

## 2024-10-24 ASSESSMENT — PAIN SCALES - GENERAL: PAINLEVEL_OUTOF10: 0 - NO PAIN

## 2024-10-24 NOTE — PATIENT INSTRUCTIONS
Continue prednisone 60 mg daily until we get biopsy results    If the biopsies are negative, we can stop the prednisone vs treat you at PMR doses    Followup Nov 11, labs then    Routinely, make an appointment with vascular team since its been a year

## 2024-10-25 LAB
LABORATORY COMMENT REPORT: NORMAL
PATH REPORT.FINAL DX SPEC: NORMAL
PATH REPORT.GROSS SPEC: NORMAL
PATH REPORT.RELEVANT HX SPEC: NORMAL
PATH REPORT.TOTAL CANCER: NORMAL

## 2024-10-31 ENCOUNTER — APPOINTMENT (OUTPATIENT)
Dept: OPHTHALMOLOGY | Facility: CLINIC | Age: 74
End: 2024-10-31
Payer: MEDICARE

## 2024-11-04 ENCOUNTER — TELEPHONE (OUTPATIENT)
Dept: GENETICS | Facility: CLINIC | Age: 74
End: 2024-11-04
Payer: MEDICARE

## 2024-11-04 ENCOUNTER — APPOINTMENT (OUTPATIENT)
Dept: OBSTETRICS AND GYNECOLOGY | Facility: CLINIC | Age: 74
End: 2024-11-04
Payer: MEDICARE

## 2024-11-04 LAB
COMMENTS - MP RESULT TYPE: NORMAL
SCAN RESULT: NORMAL

## 2024-11-12 ENCOUNTER — OFFICE VISIT (OUTPATIENT)
Dept: SURGERY | Facility: HOSPITAL | Age: 74
End: 2024-11-12
Payer: MEDICARE

## 2024-11-12 VITALS — HEART RATE: 79 BPM | SYSTOLIC BLOOD PRESSURE: 134 MMHG | DIASTOLIC BLOOD PRESSURE: 75 MMHG | TEMPERATURE: 97 F

## 2024-11-12 DIAGNOSIS — Z09 SURGERY FOLLOW-UP: Primary | ICD-10-CM

## 2024-11-12 PROCEDURE — 99211 OFF/OP EST MAY X REQ PHY/QHP: CPT | Performed by: SURGERY

## 2024-11-12 PROCEDURE — 3078F DIAST BP <80 MM HG: CPT | Performed by: SURGERY

## 2024-11-12 PROCEDURE — 3075F SYST BP GE 130 - 139MM HG: CPT | Performed by: SURGERY

## 2024-11-12 PROCEDURE — 1036F TOBACCO NON-USER: CPT | Performed by: SURGERY

## 2024-11-12 PROCEDURE — 1159F MED LIST DOCD IN RCRD: CPT | Performed by: SURGERY

## 2024-11-12 PROCEDURE — 1126F AMNT PAIN NOTED NONE PRSNT: CPT | Performed by: SURGERY

## 2024-11-12 RX ORDER — ACETAMINOPHEN 500 MG
500 TABLET ORAL EVERY 6 HOURS PRN
COMMUNITY

## 2024-11-12 ASSESSMENT — PAIN SCALES - GENERAL: PAINLEVEL_OUTOF10: 0-NO PAIN

## 2024-11-12 NOTE — LETTER
November 12, 2024     Brooke Xie MD  [address]  [address]  [facility] [address]    Patient: Roseanne Lira   YOB: 1950   Date of Visit: 11/12/2024       Dear Dr. Brooke Xie MD:    Thank you for referring Roseanne Lira to me for evaluation. Below are my notes for this consultation.  If you have questions, please do not hesitate to call me. I look forward to following your patient along with you.       Sincerely,     Luis Carlos Barton MD      CC: Page Lopez MD  ______________________________________________________________________________________    Assessment/Plan  Status post bilateral temporal artery biopsies.  Wounds of healed as expected.  I reviewed final pathology report.  She will follow-up with me as needed    Subjective  Roseanne following up after recent bilateral temporal artery biopsy.  Not having any drainage or pain.  Pathology report came back negative       Objective    Physical Exam  NAD  A&Ox3  Non icteric  Surgical sites in the temporal region bilaterally both well-healed without signs of infection        Relevant Results    ollected 10/23/2024 12:11       Status: Final result       Visible to patient: Yes (seen)       Dx: Arteritis, unspecified (CMS-[facility])    0 Result Notes      Component    FINAL DIAGNOSIS   A.  B.  Temporal arteries, left and right, biopsies:  - Negative for arteritis (for both, left and right).     Note:  Several H&E levels were examined for parts A and B.  No evidence of active or healed arteritis is seen.  Clinical correlation is recommended.   Electronically signed by Argenis Rodriguez MD PhD on 10/25/2024 at        No results found. However, due to the size of the patient record, not all encounters were searched. Please check Results Review for a complete set of results.        I spent 25 minutes in the professional and overall care of this patient.      Luis Carlos Barton MD

## 2024-11-12 NOTE — PROGRESS NOTES
Assessment/Plan   Status post bilateral temporal artery biopsies.  Wounds of healed as expected.  I reviewed final pathology report.  She will follow-up with me as needed    Subjective   Roseanne following up after recent bilateral temporal artery biopsy.  Not having any drainage or pain.  Pathology report came back negative       Objective     Physical Exam  NAD  A&Ox3  Non icteric  Surgical sites in the temporal region bilaterally both well-healed without signs of infection        Relevant Results    ollected 10/23/2024 12:11       Status: Final result       Visible to patient: Yes (seen)       Dx: Arteritis, unspecified (CMS-[facility])    0 Result Notes      Component    FINAL DIAGNOSIS   A.  B.  Temporal arteries, left and right, biopsies:  - Negative for arteritis (for both, left and right).     Note:  Several H&E levels were examined for parts A and B.  No evidence of active or healed arteritis is seen.  Clinical correlation is recommended.   Electronically signed by Argenis Rodriguez MD PhD on 10/25/2024 at        No results found. However, due to the size of the patient record, not all encounters were searched. Please check Results Review for a complete set of results.        I spent 25 minutes in the professional and overall care of this patient.      Luis Carlos Barton MD

## 2024-11-13 ENCOUNTER — APPOINTMENT (OUTPATIENT)
Dept: PREADMISSION TESTING | Facility: HOSPITAL | Age: 74
End: 2024-11-13
Payer: MEDICARE

## 2024-11-20 DIAGNOSIS — R60.0 LOCALIZED EDEMA: ICD-10-CM

## 2024-11-20 RX ORDER — FUROSEMIDE 20 MG/1
TABLET ORAL
Qty: 90 TABLET | Refills: 0 | Status: SHIPPED | OUTPATIENT
Start: 2024-11-20

## 2024-12-04 ENCOUNTER — HOSPITAL ENCOUNTER (OUTPATIENT)
Dept: RADIOLOGY | Facility: EXTERNAL LOCATION | Age: 74
Discharge: HOME | End: 2024-12-04

## 2024-12-04 ENCOUNTER — OFFICE VISIT (OUTPATIENT)
Dept: ORTHOPEDIC SURGERY | Facility: HOSPITAL | Age: 74
End: 2024-12-04
Payer: MEDICARE

## 2024-12-04 DIAGNOSIS — M17.12 PRIMARY OSTEOARTHRITIS OF LEFT KNEE: ICD-10-CM

## 2024-12-04 PROCEDURE — 2500000004 HC RX 250 GENERAL PHARMACY W/ HCPCS (ALT 636 FOR OP/ED): Mod: JZ | Performed by: FAMILY MEDICINE

## 2024-12-04 PROCEDURE — 1159F MED LIST DOCD IN RCRD: CPT | Performed by: FAMILY MEDICINE

## 2024-12-04 PROCEDURE — 20611 DRAIN/INJ JOINT/BURSA W/US: CPT | Mod: LT | Performed by: FAMILY MEDICINE

## 2024-12-04 PROCEDURE — 99214 OFFICE O/P EST MOD 30 MIN: CPT | Performed by: FAMILY MEDICINE

## 2024-12-04 PROCEDURE — 1125F AMNT PAIN NOTED PAIN PRSNT: CPT | Performed by: FAMILY MEDICINE

## 2024-12-04 ASSESSMENT — PAIN SCALES - GENERAL: PAINLEVEL_OUTOF10: 6

## 2024-12-04 ASSESSMENT — PAIN - FUNCTIONAL ASSESSMENT: PAIN_FUNCTIONAL_ASSESSMENT: 0-10

## 2024-12-04 ASSESSMENT — PAIN DESCRIPTION - DESCRIPTORS: DESCRIPTORS: ACHING;DULL;DISCOMFORT;SORE;TENDER

## 2024-12-04 NOTE — PROGRESS NOTES
Patient ID: Roseanne Lira is a 74 y.o. female.    L Inj/Asp: L knee on 12/4/2024 11:35 AM  Indications: pain  Details: 21 G needle, ultrasound-guided superolateral approach  Medications: 48 mg hylan 48 mg/6 mL  Outcome: tolerated well, no immediate complications  Procedure, treatment alternatives, risks and benefits explained, specific risks discussed. Consent was given by the patient. Immediately prior to procedure a time out was called to verify the correct patient, procedure, equipment, support staff and site/side marked as required. Patient was prepped and draped in the usual sterile fashion.       Sports Medicine Office Note    Today's Date:  12/04/2024     HPI: Roseanne Lira is a 74 y.o. retired female here for follow-up of chronic left knee pain.       6/27/2023, she presented for evaluation of chronic left knee pain upon referral by Dr. [facility].  She was recently evaluated for her pain while following up for right knee arthroplasty. She uses a cane for ambulation. She reports having gel injections into both knees in the past with Dr. Nice, over 6 years ago. This gave her good relief. She would like to try it again with her left knee. She denies interval injury or trauma since her last visit with our office. We agreed to pursue insurance approval for viscosupplementation to help her chronic left knee DJD pain. She will continue her current conservative treatment plan otherwise. She will follow-up when she has insurance approval.     9/29/2023, she returns for left knee viscosupplementation. Denies interval injury or trauma to the left knee. She has continued to do her home physical therapy exercises, which she feels have been helpful.   We agreed to proceed with a trial of viscosupplementation with Synvisc 1. She tolerated this well. Activity modifications were reviewed. She will follow-up in 8 weeks.     12/13/2023, she returns for 2-1/2-month follow-up of chronic left knee pain status post a trial  of Synvisc hyaluronic acid injection.  She reports she is doing much better and is having less pain.  She did have a recent flareup 2 days ago after [place] but otherwise she is very happy with her improvement.  She denies interval injury or trauma.  We agreed that she has responded very well to the trial of viscosupplementation with Synvisc at her left knee.  She understands this can be repeated every 6 months or later if needed.  We agreed upon a referral to physical therapy for strengthening of her lower extremities.  She will continue her current conservative treatment plan.  She will follow-up when her pain returns.    5/28/2024, she returns for 6.5-month follow-up of chronic left knee pain with DJD.  She has been approved to start viscosupplementation.  The previous Synvisc 1 injection given on 9/29/2023 gave her 7 months of very good relief and has gradually started to wear off.  She is considering getting her knee replaced this winter with Dr. [facility], who replaced her opposite knee.  She denies interval injury or trauma.  We agreed to repeat Synvisc 1 viscosupplementation today at the left knee.  She tolerated this well.  Activity modifications were reviewed.  She is trying to schedule joint replacement this winter with Dr. [facility].  I am happy to see her back as needed.    Today, 12/4/2024, she returns for 6-month follow-up of chronic left knee DJD pain.  She is requesting long-term analgesia and to repeat Synvisc 1.  She denies interval injury or trauma.    She has no other complaints.    Physical Examination:   The RIGHT knee is nontender and stable. There is a well-healed scar.  The LEFT knee has no joint effusion. Patella crepitus is positive. There is no tenderness to the medial and lateral joint lines. Flexion and extension are without mechanical blocking. There is no instability with stress testing.   Skin - no rashes, sores, or open lesions. Strength, sensory and  vascular exams are otherwise normal. There is no clubbing, cyanosis or edema.  Gait is slightly antalgic and tandem.      Procedure Note:    After consent was obtained, the LEFT knee was prepped in a sterile fashion. Ultrasound guidance was used to help insure proper needle placement into the knee joint, decrease patient discomfort, and decrease collateral damage. The joint was visualized and Synvisc 1 was injected without any complications. Ultrasound images were saved on an internal file for later reference. The patient tolerated the procedure well and the area was cleaned and bandaged.    Problem List Items Addressed This Visit             ICD-10-CM    Osteoarthritis of left knee M17.12    Relevant Orders    Point of Care Ultrasound (Completed)    Disability Placard       Assessment and Plan:     We reviewed the exam and x-ray findings and discussed the conservative and surgical treatment options. We agreed to repeat Synvisc 1 viscosupplementation today at the left knee.  She tolerated this well.  Activity modifications were reviewed.  I am happy to see her back as needed.    **This note was dictated using Dragon speech recognition software and was not corrected for spelling or grammatical errors**.    Palomo Stephenson MD  Sports Medicine Specialist  [facility] [place]

## 2024-12-05 ENCOUNTER — APPOINTMENT (OUTPATIENT)
Dept: RHEUMATOLOGY | Facility: CLINIC | Age: 74
End: 2024-12-05
Payer: MEDICARE

## 2024-12-05 VITALS
DIASTOLIC BLOOD PRESSURE: 78 MMHG | BODY MASS INDEX: 32.22 KG/M2 | SYSTOLIC BLOOD PRESSURE: 130 MMHG | HEIGHT: 65 IN | HEART RATE: 60 BPM | OXYGEN SATURATION: 97 % | WEIGHT: 193.4 LBS

## 2024-12-05 DIAGNOSIS — M79.7 FIBROMYALGIA: ICD-10-CM

## 2024-12-05 DIAGNOSIS — M54.50 CHRONIC BILATERAL LOW BACK PAIN WITHOUT SCIATICA: ICD-10-CM

## 2024-12-05 DIAGNOSIS — M35.3 PMR (POLYMYALGIA RHEUMATICA) (MULTI): ICD-10-CM

## 2024-12-05 DIAGNOSIS — G89.29 CHRONIC BILATERAL LOW BACK PAIN WITHOUT SCIATICA: ICD-10-CM

## 2024-12-05 DIAGNOSIS — M85.80 OSTEOPENIA, UNSPECIFIED LOCATION: Primary | ICD-10-CM

## 2024-12-05 PROCEDURE — 1159F MED LIST DOCD IN RCRD: CPT | Performed by: STUDENT IN AN ORGANIZED HEALTH CARE EDUCATION/TRAINING PROGRAM

## 2024-12-05 PROCEDURE — 99214 OFFICE O/P EST MOD 30 MIN: CPT | Performed by: STUDENT IN AN ORGANIZED HEALTH CARE EDUCATION/TRAINING PROGRAM

## 2024-12-05 PROCEDURE — 1036F TOBACCO NON-USER: CPT | Performed by: STUDENT IN AN ORGANIZED HEALTH CARE EDUCATION/TRAINING PROGRAM

## 2024-12-05 PROCEDURE — 3078F DIAST BP <80 MM HG: CPT | Performed by: STUDENT IN AN ORGANIZED HEALTH CARE EDUCATION/TRAINING PROGRAM

## 2024-12-05 PROCEDURE — 3075F SYST BP GE 130 - 139MM HG: CPT | Performed by: STUDENT IN AN ORGANIZED HEALTH CARE EDUCATION/TRAINING PROGRAM

## 2024-12-05 NOTE — PROGRESS NOTES
Subjective   Patient ID: Roseanne Lira is a 74 y.o. female who presents for Follow-up (PMR).  HPI: Female with a history of polymyalgia rheumatica in remission; last saw Dr. 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  Due to bilateral temporal pain lasting a few seconds happening several times a day, feelings of worsening vision, I sent her to ophthalmology, started her on prednisone 60 mg daily, and arranged for bilateral temporal biopsy.  Ophthalmology saw her October 22, 2024, Dr Glynn Guerrero, and saw no signs of GCA ophthalmologically, Dr. Barton surgery did bilateral temporal artery biopsies October 23, 2024 that were negative; Pred 60 was started Oct 17 2024, but stopped when negative biopsies returned    Historical labs: CCP neg, RF neg    7/2024 labs:  WBC normal, Hgb normal, platelets normal  Cr H to 1.0, ALP/AST/ALT/albumin/protein normal     Infectious: 2024  Hep B Core total neg, Hep B S Ag neg, Hep C Ab neg,  T spot neg    Imaging: 10/16/2024-    Temporal US  Right Carotid: Extremely tortuous temporal artery noted.  "Vessel appears patent. Size and peak systoloc velocities are as follows:  Proximal- 2.4 mm, 53 cm/sec  Mid-1.6 mm, 82 cm/sec  Distal- 1.0 mm, 43 cm/sec.  Left Carotid: Extremely tortuous temporal artery noted. Vessel appears patent. Size and peak systoloc velocities are as follows:  Proximal- 1.5 mm, 76 cm/sec  Mid- 1.6 mm, 54 cm/sec  Distal- 1.6 mm, 70 cm/sec.      Rheum hx per patient:   -Dx around 2019  -Was put on pred 15 ,and brought her off over 11 months  -Around 2021, patient sxs were intense pain; she does not exactly remember where; She had pain in shoulders, coming down arms, legs also hurt  -Occasionally, patient has aches on both shoulders  -Her arms get very tried washing her hair,     Has AM stiffness just for a few min. Has lower back pain for a few min      Rheumatology specific review of systems  Regular joint pain, morning stiffness>30 min, fevers , chills, unintentional weight loss, rashes, alopecia, mouth sores, nasal ulcers, malar rash,  Raynauds, morning stiffness in back>30 min, dry eyes, dry mouth, blood clots, recurrent miscarriages, rheum fam hx, uveitis, blood or mucus in stool     Chronic pain specific review of systems   widespread pain , widespread tenderness, brain fog, depression/anxiety, migraines or tension headaches, IBS or heartburn symptoms, irritable or overactive bladder, pelvic pain ,TMJ pain,poor sleep, fatigue    Objective   /78 (BP Location: Right arm, Patient Position: Sitting, BP Cuff Size: Adult)   Pulse 60   Ht 1.638 m (5' 4.5\")   Wt 87.7 kg (193 lb 6.4 oz)   SpO2 97%   BMI 32.68 kg/m²       Physical Exam  Constitutional: Alert and in no acute distress. Well developed, well nourished  Head and Face: Head and face: Normal.    Cardiovascular: Heart rate and rhythm were normal, normal S1 and S2. No peripheral edema.   Pulmonary: No respiratory distress. Clear bilateral breath sounds.  Musculoskeletal: no synovitis througout, No TTP over temporal arteries, " surgical scars on b.l tempes cdi, left side totally healed, right side with escar  Skin: Normal skin color and pigmentation, normal skin turgor, and no rash.    Psychiatric: Judgment and insight: Intact. Mood and affect: Normal.     Lab Results   Component Value Date    WBC 8.2 10/16/2024    HGB 13.7 10/16/2024    HCT 43.3 10/16/2024     10/16/2024    ALT 10 10/16/2024    AST 14 10/16/2024    CREATININE 1.17 (H) 10/16/2024          Lab Results   Component Value Date    SEDRATE 14 10/16/2024    CRP 1.81 (H) 10/16/2024   \\            There is currently no information documented on the homunculus. Go to the Rheumatology activity and complete the homunculus joint exam.      === 06/13/24 ===    DEXA BONE DENSITY    - Impression -  DEXA:  According to World Health Organization criteria,  classification is low bone mass (osteopenia)    Followup recommended in two years or sooner as clinically warranted.    All images and detailed analysis are available on the  Radiology  PACS.    MACRO:  None    Signed by: Ever Bains 6/13/2024 12:56 PM  Dictation workstation:   [id number]    Assessment/Plan:  #PMR  -I reviewed Dr. Antunez's records that patient brought with her on June 2024  -in remission, but still has subjectively weak arms  -Symptomatically, still in remission.  Baseline ESR and CRP done 7/2024 when in remission: sed rate normal at 22, CRP elevated to 1.6  -Repeat labs done October 2024, sed rate normal but CRP elevated to 1.8  -Patient counseled on symptoms of giant cell arteritis and instructed to go to the ED immediately ([facility] is preferable) if they get new vision changes or severe headache, they were instructed that they should tell the ED physician that their rheumatologist has told them that they may be at risk for giant cell arteritis and high dose steroids should be started while they are working it up.  -Has chronic vision changes; per patient, her neurologist Dr. Robledo does not think  this is related to her migraine.  Referral placed to neuro-ophthalmology Dr. Leal June 2024; she has not established yet; she states that her vision changes have resolved, if this is the case, she can cancel her appt with Dr Leal- will defer to patient; but counseled her of this    #Concern for GCA, vision threatening- negative b/l biopsies 10/2024, pred stopped  -New temporal headaches occurring several times a day, left worse than right, with unchanged MRI, since July 2024.  Vision changes since the end of September 2024  -Ophthalmology saw her October 22, 2024, no active signs of GCA  -Bilateral tabs done October 23, 2024  -Hepatitis and TB serologies neg October 2024  -Spoke with reading vascular surgeon  about temporal artery ultrasound on October 17, 2024; had tortuous temporal arteries, and potentially a soft halo sign; he said that the temporal artery ultrasound was not definitive for this patient  -was on high dose prednisone from 10/17/2024- end October 2024,  but received bilateral temporal artery biopsies October 23, 2024 that were negative, so high-dose prednisone was stopped  -Patient does have known atherosclerosis, since bilateral biopsies were negative, she can stop prednisone continue following up with neurology and ophthalmology about her headache and intermittent vision changes  -Recommended that patient follow-up with her vascular surgeon, as they had wanted to repeat carotid imaging in around a year, and last saw her around June 2023    #OA  -Sees orthopedics, has a right knee replacement and is pending a left knee replacement  -Sees orthospine for her osteoarthritis  -Requested a referral for acupuncture as this has helped in the past; referral placed June 2024 but patient counseled to call and see if her insurance covers it, and if not, how much her out-of-pocket cost will be    #Osteopenia  -on 6.2024 dexa- repeat 6.2026  -continue vit d 5000 units daily   -recommended eating  extra ca in diet (patient with hx of kidney stones)  -walk as tolerated  #Preventative Health Recommendations for Primary Care Providers    Vaccine Recommendations:    From ACR 2022 vaccine recommendations:    For Rheumatic and musculoskeletal disease (RMD) patients aged greater than or equal to 65 years, and RMD patients aged >18 and <65 years who are on immunosuppressive medication, giving high-dose or adjuvanted influenza vaccination is conditionally  recommended over giving regular-dose influenza vaccination.    For patients with RMD aged <65 years who are on immunosuppressive medication, pneumococcal vaccination is strongly recommended.    For patients with RMD aged >18 years who are on immunosuppressive medication, administering the recombinant zoster vaccine is strongly recommended.    For patients with RMD aged >26 and <45 years who are on immunosuppressive medication and not previously vaccinated, vaccination against HPV is conditionally recommended.    Cardiovascular Recommendations:    Patients with inflammatory diseases are at high risk for cardiovascular disease, and should be aggressively screened by primary care physicians and started on lipid-lowering medications when appropriate.    Bone Health Recommendations:    Patients on steroids should be on calcium and Vitamin D. Patients with inflammatory rheumatic diseases are higher risk for osteoporosis and should have baseline DEXA screening.     Cervical Cancer Screening Recommendations (adapted from ASCCP)  Cytology is recommended if younger than 30 years.  Co-testing is preferred, but cytology is acceptable if 30 years or older.  If using cytology alone, perform annual cervical cytology. If results of 3 consecutive cytology results are normal, perform cytology every 3 years.  If using co-testing, perform baseline co-test with cytology and HPV. If result of cytology is normal and HPV is negative, co-testing can be performed every 3 years.  Continue  screening throughout lifetime (older than 65 years). Discontinue screening based on shared discussion regarding quality and duration of life rather than age.      Patient counseled to seek medical care if any new or worsening symptoms, urgently if needed.      Note will be sent to primary care doctor     Return to clinic in 6 mo, sooner if needed    Dragon dictation software was used to dictate this note. Errors may have occurred during dictation that was not intended by the user.      [address]11 based off of 2 or more chronic illnesses, review of individual 3 tests, prescription drug management (stopping prednisone)

## 2024-12-11 ENCOUNTER — APPOINTMENT (OUTPATIENT)
Dept: PRIMARY CARE | Facility: CLINIC | Age: 74
End: 2024-12-11
Payer: MEDICARE

## 2024-12-11 VITALS
SYSTOLIC BLOOD PRESSURE: 130 MMHG | DIASTOLIC BLOOD PRESSURE: 68 MMHG | RESPIRATION RATE: 16 BRPM | WEIGHT: 192.02 LBS | BODY MASS INDEX: 32.78 KG/M2 | HEIGHT: 64 IN | HEART RATE: 77 BPM | OXYGEN SATURATION: 97 %

## 2024-12-11 DIAGNOSIS — K21.9 GASTRO-ESOPHAGEAL REFLUX DISEASE WITHOUT ESOPHAGITIS: ICD-10-CM

## 2024-12-11 DIAGNOSIS — G89.4 CHRONIC PAIN SYNDROME: ICD-10-CM

## 2024-12-11 DIAGNOSIS — E78.5 DYSLIPIDEMIA: ICD-10-CM

## 2024-12-11 DIAGNOSIS — R09.89 LABILE HYPERTENSION: Primary | ICD-10-CM

## 2024-12-11 PROCEDURE — 99214 OFFICE O/P EST MOD 30 MIN: CPT | Performed by: INTERNAL MEDICINE

## 2024-12-11 ASSESSMENT — ENCOUNTER SYMPTOMS
BLOOD IN STOOL: 0
LOSS OF SENSATION IN FEET: 0
CHEST TIGHTNESS: 0
DEPRESSION: 1
OCCASIONAL FEELINGS OF UNSTEADINESS: 0
CONSTIPATION: 0

## 2024-12-11 ASSESSMENT — PATIENT HEALTH QUESTIONNAIRE - PHQ9
5. POOR APPETITE OR OVEREATING: NOT AT ALL
2. FEELING DOWN, DEPRESSED OR HOPELESS: SEVERAL DAYS
SUM OF ALL RESPONSES TO PHQ QUESTIONS 1-9: 5
6. FEELING BAD ABOUT YOURSELF - OR THAT YOU ARE A FAILURE OR HAVE LET YOURSELF OR YOUR FAMILY DOWN: NOT AT ALL
9. THOUGHTS THAT YOU WOULD BE BETTER OFF DEAD, OR OF HURTING YOURSELF: NOT AT ALL
1. LITTLE INTEREST OR PLEASURE IN DOING THINGS: MORE THAN HALF THE DAYS
3. TROUBLE FALLING OR STAYING ASLEEP OR SLEEPING TOO MUCH: NOT AT ALL
10. IF YOU CHECKED OFF ANY PROBLEMS, HOW DIFFICULT HAVE THESE PROBLEMS MADE IT FOR YOU TO DO YOUR WORK, TAKE CARE OF THINGS AT HOME, OR GET ALONG WITH OTHER PEOPLE: NOT DIFFICULT AT ALL
7. TROUBLE CONCENTRATING ON THINGS, SUCH AS READING THE NEWSPAPER OR WATCHING TELEVISION: NOT AT ALL
SUM OF ALL RESPONSES TO PHQ9 QUESTIONS 1 AND 2: 3
4. FEELING TIRED OR HAVING LITTLE ENERGY: MORE THAN HALF THE DAYS
8. MOVING OR SPEAKING SO SLOWLY THAT OTHER PEOPLE COULD HAVE NOTICED. OR THE OPPOSITE, BEING SO FIGETY OR RESTLESS THAT YOU HAVE BEEN MOVING AROUND A LOT MORE THAN USUAL: NOT AT ALL

## 2024-12-11 NOTE — PROGRESS NOTES
"Subjective   Patient ID: Roseanne Lira is a 74 y.o. female who presents for Hypertension and AD (Adjustment Disorder).    HPI Diet remains low salt. Her edema has been well controled.   She started statin in July without side effect.  Mood gets down during cold months, her psyquiatrist has offered duloxetine but she wants to wait til spring. [facility]  She had bilateral temporal arterial biopsies to eval for temporal arteritis that were negative , her right incision is not healing well.  In the month of October she had intense lower back pain and proximal upper extremities. She believes had a flare of PMR , treated with prednisone that helped. Unable to reach acupunturist with   She had covid on November 19, mild case, back to her base line.  She is undecided to go or not for left knee replacement.       Review of Systems   Respiratory:  Negative for chest tightness.         Sob with extreme exertion   Gastrointestinal:  Negative for blood in stool and constipation.        Heartburn asymptomatic with famotidine   Neurological:         Seconds of intense temporal pain bilateral , few times/day   All other systems reviewed and are negative.      Objective   /68   Pulse 77   Resp 16   Ht 1.626 m (5' 4\")   Wt 87.1 kg (192 lb 0.3 oz)   SpO2 97%   BMI 32.96 kg/m²     Physical Exam  Eyes- Conjunctiva clear  Temporal surgical scar on right side still with eryhthema, no discharge no open skin  Neck-no thyromegaly, remote right surgical scar  Cardiac- regular rate and rhythm, no murmurs  Lung- clear to auscultation  GI- present  bowel sounds, nontender, no rebound  MSK- no deformities, minimal cracking on left knee presereved rom  Extremities- no edema, good distal pulses  Neuro- non focal, oriented x 3  Psychiatric- pleasant, well groomed, no hallucinations    Assessment/Plan   Problem List Items Addressed This Visit             ICD-10-CM    Dyslipidemia E78.5     Good " tolerance to current statin Continue same dose         Gastro-esophageal reflux disease without esophagitis K21.9     Well control         Labile hypertension - Primary R09.89     Well control, using furosemide PRN         Chronic pain syndrome G89.4     Pending acupunture, advice to consider duloxetine

## 2024-12-30 ENCOUNTER — APPOINTMENT (OUTPATIENT)
Facility: CLINIC | Age: 74
End: 2024-12-30
Payer: MEDICARE

## 2024-12-30 ENCOUNTER — TELEPHONE (OUTPATIENT)
Dept: GENETICS | Facility: CLINIC | Age: 74
End: 2024-12-30

## 2024-12-31 NOTE — PROGRESS NOTES
An telephone (audio only) between the patient (at the originating site) and provider (at the distant site) was utilized to provide this telehealth service. Verbal consent was requested and obtained from Roseanne Lira on this date, January 2, 2025 for a telehealth visit. Patient confirmed they were in the [facility] at the time of the appointment.    - You will receive a copy of your genetic testing results in the mail. Below is a summary of what we discussed at your appointment.    - Roseanne Lira is a 74 y.o. female with a personal history of [breast and uterine cancers.  - Roseanne Lira was initially seen in the [facility]etics [facility] on 8/30/24 for counseling and coordination of testing.    - Based on her personal history and family history, the 71 gene  CancerNext-Expanded +RNAinsight Panel from [employer] was recommended and ordered.  - I called her to review the results of this testing, and our discussion is summarized below.    - Roseanne Lira's results were NEGATIVE, meaning that no disease causing mutations were identified.  - A genetic cause for her  history of cancer has not been identified.    - One reason Roseanne Lira underwent genetic testing was to better understand her risk to develop a second breast cancer to help determine if further breast screening or surgery is indicated.  - Because her results were negative, Roseanne Lira does not have an identifiable genetic risk factor that places her at an increased risk to develop a second breast cancer.     - Your history may still suggest hereditary breast cancer. This could be due to a mutation in a gene which is not currently detectable, or due to a mutation in a different gene that is yet to be associated with hereditary breast cancer. More testing in your family members may help to determine this, though many of your family members have already had genetic testing, and still no cause has been found.    - It was previously discussed that women  with BRCA1 and BRCA2 mutations have an increased risk for ovarian cancer. With negative test results and no family history of ovarian cancer, she is not considered at increased risk for this cancer type from a genetic standpoint. Prophylactic surgery is not indicated from a genetic standpoint.    - You should continue to follow with your care team for all cancer management and surveillance recommendations. You should also follow with your primary care providers for all other age-related cancer screenings, such as Pap smears, colonoscopies, etc.    - Regarding your children, if there is no significant history of cancer on their father's side, no genetic testing is recommended for them at this time, since your test results did not show anything they need to be tested for.    - Although her results were negative, Roseanne Lira's female relatives are considered to have an increased risk to develop breast cancer over the general population, based on the family history alone.   - They should speak to their healthcare providers about their breast cancer screening protocols, as they may be a candidate for annual breast MRIs, in addition to an annual mammogram and clinic breast exam.   - Breast cancer screening should also begin 10 years younger than the youngest diagnosis in the family, or by age 40, whichever comes first.     -Similarly, your close female relatives may have a mildly increased for uterine cancer based on your history alone. One study found that those with a first degree relative with uterine cancer (like a parent, sibling, or child) have about 5-6% lifetime risk compared to the average risk of ~3% (PMID: 82683324). At this time, we do not have standard screening recommendations, especially as there is no evidence routine, screening uterine biopsy has benefits. Because endometrial cancer can often be detected early based on symptoms, your female relatives should be educated regarding the importance of prompt  reporting and evaluation of any abnormal uterine bleeding or postmenopausal bleeding.    - Our understanding of genetic contribution to cancer diagnoses is always evolving, so there may be additional testing recommended in the future. She can contact us in 3-5 years to determine if there have been any changes since our discussion today.      Judy Bangura MS, [facility]  Certified Genetic Counselor  [facility]  [phone]        Time spent: 9 minutes

## 2025-01-02 ENCOUNTER — TELEMEDICINE CLINICAL SUPPORT (OUTPATIENT)
Facility: CLINIC | Age: 75
End: 2025-01-02
Payer: MEDICARE

## 2025-01-02 DIAGNOSIS — C50.911 MALIGNANT NEOPLASM OF RIGHT FEMALE BREAST, UNSPECIFIED ESTROGEN RECEPTOR STATUS, UNSPECIFIED SITE OF BREAST: ICD-10-CM

## 2025-01-02 DIAGNOSIS — Z71.83 ENCOUNTER FOR NONPROCREATIVE GENETIC COUNSELING: ICD-10-CM

## 2025-01-02 DIAGNOSIS — C54.1 ENDOMETRIAL ADENOCARCINOMA (MULTI): ICD-10-CM

## 2025-01-02 PROCEDURE — GENMD PR GENETICS VISIT (MEDICAID/MEDICARE)

## 2025-01-07 ENCOUNTER — APPOINTMENT (OUTPATIENT)
Dept: VASCULAR SURGERY | Facility: CLINIC | Age: 75
End: 2025-01-07
Payer: MEDICARE

## 2025-01-15 NOTE — PROGRESS NOTES
"Assessment and Plan    10/17/2024 Vascular US Temporal Artery Duplex Bilateral, by report, shows patent vessels with tortuosity bilaterally.    08/20/2024 MRI brain without contrast, which I personally reviewed, shows volume loss and mild bihemispheric white matter FLAIR lesions with a right frontal extraaxial mass.  12/09/2022 CTA head & neck, which I personally reviewed, shows right vertebral artery occlusion.    Prior head imaging.    10/23/2024 pathology \"A.  B.  Temporal arteries, left and right, biopsies:  - Negative for arteritis (for both, left and right).\"    Lab Results   Component Value Date/Time    SEDRATE 14 10/16/2024 1421    SEDRATE 22 07/09/2024 1106    SEDRATE 14 07/05/2019 1427    CRP 1.81 (H) 10/16/2024 1421    CRP 1.60 (H) 07/09/2024 1106    CRP 4.21 (A) 07/05/2019 1427     Lab Results   Component Value Date/Time    [id number] 998 (H) 08/17/2022 1109    [id number] 401 08/02/2021 1048    [id number] 338 07/05/2019 1427     Tuberculosis studies  Lab Results   Component Value Date/Time    TBSIN Negative 10/16/2024 1421     03/25/2017 HVF 24-2 OD fovea 37, wnl MD -0.69 & OS fovea 36, wnl MD -0.46.    This 74 year-old woman with a history of meningiomas x 3 with 2 operations, acephalgic migraine with visual aura, polymyalgia rheumatica, vertebral artery aneurysm, DCIS right breast, DLD, alopecia areata, CKD, asthma, GERD presents for evaluation of visual aura.    Her history of consistent with visual aura of migraine without headache. I do not suspect stroke or other lesion based on reassuring examination and recent head imaging without a causative lesion. She is not interested in prophylaxis, which is reasonable. We discussed stroke risk reduction.    Floaters are consistent with posterior vitreous detachment. We reviewed retinal detachment precautions.    Regarding giant cell arteritis, I do not suspect it given that her visual experiences are not ones associated closely with giant cell arteritis and " temporal artery biopsy was negative.    Follow up as needed. (Dilated 1/16/2025)

## 2025-01-16 ENCOUNTER — APPOINTMENT (OUTPATIENT)
Dept: ORTHOPEDIC SURGERY | Facility: CLINIC | Age: 75
End: 2025-01-16
Payer: MEDICARE

## 2025-01-16 ENCOUNTER — APPOINTMENT (OUTPATIENT)
Dept: OPHTHALMOLOGY | Facility: CLINIC | Age: 75
End: 2025-01-16
Payer: MEDICARE

## 2025-01-16 DIAGNOSIS — G43.109 MIGRAINE AURA WITHOUT HEADACHE: Primary | ICD-10-CM

## 2025-01-16 PROCEDURE — 99204 OFFICE O/P NEW MOD 45 MIN: CPT | Performed by: PSYCHIATRY & NEUROLOGY

## 2025-01-16 ASSESSMENT — CONF VISUAL FIELD
OS_INFERIOR_TEMPORAL_RESTRICTION: 0
OD_INFERIOR_TEMPORAL_RESTRICTION: 0
OD_NORMAL: 1
OS_SUPERIOR_NASAL_RESTRICTION: 0
OD_INFERIOR_NASAL_RESTRICTION: 0
OS_NORMAL: 1
OS_SUPERIOR_TEMPORAL_RESTRICTION: 0
OD_SUPERIOR_NASAL_RESTRICTION: 0
OS_INFERIOR_NASAL_RESTRICTION: 0
OD_SUPERIOR_TEMPORAL_RESTRICTION: 0

## 2025-01-16 ASSESSMENT — SLIT LAMP EXAM - LIDS
COMMENTS: NORMAL
COMMENTS: NORMAL

## 2025-01-16 ASSESSMENT — EXTERNAL EXAM - RIGHT EYE: OD_EXAM: NORMAL

## 2025-01-16 ASSESSMENT — ENCOUNTER SYMPTOMS
ENDOCRINE NEGATIVE: 0
PSYCHIATRIC NEGATIVE: 0
GASTROINTESTINAL NEGATIVE: 0
HEMATOLOGIC/LYMPHATIC NEGATIVE: 0
NEUROLOGICAL NEGATIVE: 0
EYES NEGATIVE: 1
RESPIRATORY NEGATIVE: 0
MUSCULOSKELETAL NEGATIVE: 0
ALLERGIC/IMMUNOLOGIC NEGATIVE: 0
CARDIOVASCULAR NEGATIVE: 0
CONSTITUTIONAL NEGATIVE: 0

## 2025-01-16 ASSESSMENT — TONOMETRY
OD_IOP_MMHG: 13
OS_IOP_MMHG: 12
IOP_METHOD: TONOPEN

## 2025-01-16 ASSESSMENT — VISUAL ACUITY
OS_CC: 20/20
METHOD: SNELLEN - LINEAR
OD_CC: 20/25
CORRECTION_TYPE: GLASSES

## 2025-01-16 ASSESSMENT — CUP TO DISC RATIO
OD_RATIO: .3
OS_RATIO: .3

## 2025-01-16 ASSESSMENT — EXTERNAL EXAM - LEFT EYE: OS_EXAM: NORMAL

## 2025-01-21 ENCOUNTER — TELEPHONE (OUTPATIENT)
Dept: RADIATION ONCOLOGY | Facility: HOSPITAL | Age: 75
End: 2025-01-21
Payer: MEDICARE

## 2025-01-22 ENCOUNTER — HOSPITAL ENCOUNTER (OUTPATIENT)
Dept: RADIATION ONCOLOGY | Facility: HOSPITAL | Age: 75
Setting detail: RADIATION/ONCOLOGY SERIES
Discharge: HOME | End: 2025-01-22
Payer: MEDICARE

## 2025-01-22 VITALS
HEART RATE: 69 BPM | TEMPERATURE: 96.8 F | RESPIRATION RATE: 18 BRPM | BODY MASS INDEX: 33.61 KG/M2 | WEIGHT: 195.8 LBS | OXYGEN SATURATION: 97 % | SYSTOLIC BLOOD PRESSURE: 136 MMHG | DIASTOLIC BLOOD PRESSURE: 83 MMHG

## 2025-01-22 DIAGNOSIS — C50.911 MALIGNANT NEOPLASM OF RIGHT FEMALE BREAST, UNSPECIFIED ESTROGEN RECEPTOR STATUS, UNSPECIFIED SITE OF BREAST: ICD-10-CM

## 2025-01-22 DIAGNOSIS — C54.1 ENDOMETRIAL ADENOCARCINOMA (MULTI): Primary | ICD-10-CM

## 2025-01-22 PROCEDURE — G2211 COMPLEX E/M VISIT ADD ON: HCPCS | Performed by: NURSE PRACTITIONER

## 2025-01-22 PROCEDURE — 99214 OFFICE O/P EST MOD 30 MIN: CPT | Performed by: NURSE PRACTITIONER

## 2025-01-22 ASSESSMENT — ENCOUNTER SYMPTOMS
NECK PAIN: 0
FATIGUE: 0
NAUSEA: 0
ABDOMINAL PAIN: 0
BLOOD IN STOOL: 0
PSYCHIATRIC NEGATIVE: 1
FEVER: 0
RECTAL PAIN: 0
DEPRESSION: 0
MYALGIAS: 0
JOINT SWELLING: 0
ARTHRALGIAS: 1
OCCASIONAL FEELINGS OF UNSTEADINESS: 0
UNEXPECTED WEIGHT CHANGE: 0
LOSS OF SENSATION IN FEET: 0
ABDOMINAL DISTENTION: 0
HEMATOLOGIC/LYMPHATIC NEGATIVE: 1
HEMATURIA: 0
NEUROLOGICAL NEGATIVE: 1
RESPIRATORY NEGATIVE: 1
ANAL BLEEDING: 0
NECK STIFFNESS: 0
FREQUENCY: 0
DYSURIA: 0
DIAPHORESIS: 0
CARDIOVASCULAR NEGATIVE: 1
ACTIVITY CHANGE: 0
DIFFICULTY URINATING: 0
CONSTIPATION: 0
VOMITING: 0
BACK PAIN: 0
APPETITE CHANGE: 0
CHILLS: 0
DIARRHEA: 0

## 2025-01-22 ASSESSMENT — PAIN SCALES - GENERAL: PAINLEVEL_OUTOF10: 0-NO PAIN

## 2025-01-22 ASSESSMENT — COLUMBIA-SUICIDE SEVERITY RATING SCALE - C-SSRS
2. HAVE YOU ACTUALLY HAD ANY THOUGHTS OF KILLING YOURSELF?: NO
1. IN THE PAST MONTH, HAVE YOU WISHED YOU WERE DEAD OR WISHED YOU COULD GO TO SLEEP AND NOT WAKE UP?: NO
6. HAVE YOU EVER DONE ANYTHING, STARTED TO DO ANYTHING, OR PREPARED TO DO ANYTHING TO END YOUR LIFE?: NO

## 2025-01-22 NOTE — PROGRESS NOTES
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 Magallon; adding to gyn TB     Dr. Magallon had referred the patient for evaluation and treatment for radiation.     HDR to the vaginal cuff completed on 10/11/23.    History of presenting illness    Roseanne Lira is a 74 y.o. female who presents today for follow up 1 year and 3 months s/p HDR to the vaginal cuff. Patient is doing well. Energy is baseline. Appetite is good. Weight stable. Denies pelvic pain, vaginal bleeding or discharge. She had surgery and a sling placed for prolapse. She said leakage as rare. Bowels baseline. Denies rectal bleeding. No longer using vaginal dilator. Patient is not sexually active. Patient is s/p bilateral temporal artery biopsies. She has been getting this headaches multiple times a day. States the ache is quick. Biopsies were negative for arteritis.  Follow up with Dr. Pack in October.      Review of systems:  Review of Systems   Constitutional:  Negative for activity change, appetite change, chills, diaphoresis, fatigue, fever and unexpected weight change.   HENT: Negative.     Respiratory: Negative.     Cardiovascular: Negative.    Gastrointestinal:  Negative for abdominal distention, abdominal pain, anal bleeding, blood in stool, constipation, diarrhea, nausea, rectal pain and vomiting.   Genitourinary:  Negative for decreased urine volume, difficulty urinating, dysuria, enuresis, frequency, hematuria, pelvic pain, urgency, vaginal bleeding, vaginal discharge and vaginal pain.   Musculoskeletal:  Positive for arthralgias. Negative for back  pain, gait problem, joint swelling, myalgias, neck pain and neck stiffness.   Skin: Negative.    Neurological: Negative.    Hematological: Negative.    Psychiatric/Behavioral: Negative.         Past Medical history  She  has a past surgical history that includes Spine surgery (06/03/2013); Other surgical history (06/03/2013); Breast surgery (06/03/2013); Colonoscopy (06/03/2013); Other surgical history (10/19/2020); Tubal ligation (05/19/2015); Other surgical history (03/13/2017); Other surgical history (03/13/2017); MR angio head wo IV contrast (07/27/2020); MR angio neck wo IV contrast (07/27/2020); MR angio head wo IV contrast (05/18/2017); MR angio neck wo IV contrast (05/18/2017); CT angio neck (12/09/2022); CT angio head w and wo IV contrast (12/09/2022); Breast lumpectomy (Right); and Cataract extraction.        Last recorded vital:  /83   Pulse 69   Temp 36 °C (96.8 °F) (Skin)   Resp 18   Wt 88.8 kg (195 lb 12.8 oz)   SpO2 97%   BMI 33.61 kg/m²     Physical exam  Physical Exam  Constitutional:       General: She is not in acute distress.     Appearance: Normal appearance. She is not ill-appearing, toxic-appearing or diaphoretic.   HENT:      Head: Normocephalic.   Cardiovascular:      Rate and Rhythm: Normal rate and regular rhythm.      Pulses: Normal pulses.      Heart sounds: Normal heart sounds.   Pulmonary:      Effort: Pulmonary effort is normal.      Breath sounds: Normal breath sounds.   Abdominal:      General: Bowel sounds are normal. There is no distension.      Palpations: Abdomen is soft. There is no mass.      Tenderness: There is no abdominal tenderness. There is no guarding or rebound.      Hernia: No hernia is present.   Genitourinary:     General: Normal vulva.      Pubic Area: No rash.       Labia:         Right: No rash, tenderness, lesion or injury.         Left: No rash, tenderness, lesion or injury.       Urethra: No prolapse, urethral pain, urethral swelling or urethral  lesion.      Vagina: No vaginal discharge.      Comments: Vulvar Lichens Sclerosis.  Urethral meatus normal. Vagina normal.  Uterus and cervix surgically absent.  No evidence of masses in pelvis. No pain with palpation.  No vaginal bleeding or abnormal discharge.  Musculoskeletal:      Cervical back: Normal range of motion and neck supple.   Skin:     General: Skin is warm and dry.   Neurological:      General: No focal deficit present.      Mental Status: She is alert and oriented to person, place, and time.   Psychiatric:         Mood and Affect: Mood normal.         Behavior: Behavior normal.         Thought Content: Thought content normal.         Judgment: Judgment normal.         Plan:  Assessment/Plan     74 year old female 1 year and 3 months s/p HDR to the vaginal cuff. Patient is doing well with no acute complaints related to treatment. BINH on exam. Patient no longer using dilators.   Continue follow up with Gyn as scheduled. Patient to return to clinic in 6 months unless needs to be seen sooner. Instructed to call with questions or concerns.

## 2025-01-23 DIAGNOSIS — I65.23 BILATERAL CAROTID ARTERY STENOSIS: ICD-10-CM

## 2025-01-23 DIAGNOSIS — I65.01 VERTEBRAL ARTERY OCCLUSION, RIGHT: Primary | ICD-10-CM

## 2025-01-27 ENCOUNTER — ANCILLARY PROCEDURE (OUTPATIENT)
Dept: VASCULAR MEDICINE | Facility: CLINIC | Age: 75
End: 2025-01-27
Payer: MEDICARE

## 2025-01-27 ENCOUNTER — OFFICE VISIT (OUTPATIENT)
Dept: VASCULAR SURGERY | Facility: CLINIC | Age: 75
End: 2025-01-27
Payer: MEDICARE

## 2025-01-27 VITALS
WEIGHT: 192 LBS | BODY MASS INDEX: 32.96 KG/M2 | HEART RATE: 76 BPM | OXYGEN SATURATION: 97 % | DIASTOLIC BLOOD PRESSURE: 76 MMHG | SYSTOLIC BLOOD PRESSURE: 139 MMHG

## 2025-01-27 DIAGNOSIS — I65.23 BILATERAL CAROTID ARTERY STENOSIS: ICD-10-CM

## 2025-01-27 DIAGNOSIS — I65.01 VERTEBRAL ARTERY OCCLUSION, RIGHT: ICD-10-CM

## 2025-01-27 DIAGNOSIS — R09.89 OTHER SPECIFIED SYMPTOMS AND SIGNS INVOLVING THE CIRCULATORY AND RESPIRATORY SYSTEMS: ICD-10-CM

## 2025-01-27 DIAGNOSIS — I65.01 VERTEBRAL ARTERY OCCLUSION, RIGHT: Primary | ICD-10-CM

## 2025-01-27 PROCEDURE — 3078F DIAST BP <80 MM HG: CPT | Performed by: NURSE PRACTITIONER

## 2025-01-27 PROCEDURE — 1123F ACP DISCUSS/DSCN MKR DOCD: CPT | Performed by: NURSE PRACTITIONER

## 2025-01-27 PROCEDURE — 1036F TOBACCO NON-USER: CPT | Performed by: NURSE PRACTITIONER

## 2025-01-27 PROCEDURE — 99213 OFFICE O/P EST LOW 20 MIN: CPT | Performed by: NURSE PRACTITIONER

## 2025-01-27 PROCEDURE — 3075F SYST BP GE 130 - 139MM HG: CPT | Performed by: NURSE PRACTITIONER

## 2025-01-27 PROCEDURE — 93880 EXTRACRANIAL BILAT STUDY: CPT

## 2025-01-27 PROCEDURE — 93880 EXTRACRANIAL BILAT STUDY: CPT | Performed by: SURGERY

## 2025-01-27 PROCEDURE — 1159F MED LIST DOCD IN RCRD: CPT | Performed by: NURSE PRACTITIONER

## 2025-01-27 ASSESSMENT — ENCOUNTER SYMPTOMS
OCCASIONAL FEELINGS OF UNSTEADINESS: 0
DEPRESSION: 0
LOSS OF SENSATION IN FEET: 0

## 2025-01-27 ASSESSMENT — LIFESTYLE VARIABLES
HOW OFTEN DO YOU HAVE SIX OR MORE DRINKS ON ONE OCCASION: LESS THAN MONTHLY
HOW MANY STANDARD DRINKS CONTAINING ALCOHOL DO YOU HAVE ON A TYPICAL DAY: 1 OR 2
SKIP TO QUESTIONS 9-10: 0
AUDIT-C TOTAL SCORE: 2
HOW OFTEN DO YOU HAVE A DRINK CONTAINING ALCOHOL: MONTHLY OR LESS

## 2025-01-27 NOTE — PROGRESS NOTES
"History Of Present Illness  Roseanne Lira is a 74 y.o. female presenting with known right vertebral artery occlusion.  Patient last had a carotid artery duplex in June 2023 which showed high resistance waveforms in the right vertebral versus occlusion.  In review of the patient's records, she had a CT angiogram in December 2022 which showed an intracranial vertebral artery occlusion.  She does report that she occasionally feels \"off balance\".  She also has had difficulties with chronic headaches in the temporal area both right and left.  She recently had bilateral temporal artery biopsies.  Both of which were negative.  Otherwise, she has no neurologic complaints.     Past Medical History  She has a past medical history of Age-related nuclear cataract, left eye (03/25/2017), Age-related nuclear cataract, right eye (03/25/2017), Allergy status to unspecified drugs, medicaments and biological substances, Cortical age-related cataract, left eye (03/25/2017), Cortical age-related cataract, right eye (03/25/2017), Diarrhea, unspecified (09/25/2018), Dizziness and giddiness (06/10/2020), Elevated blood-pressure reading, without diagnosis of hypertension (10/16/2019), Encounter for general adult medical examination without abnormal findings (09/14/2020), Encounter for other preprocedural examination, Encounter for screening mammogram for malignant neoplasm of breast (06/03/2013), Epigastric pain (01/24/2020), Headache, unspecified (10/19/2020), Intraductal carcinoma in situ of unspecified breast (04/01/2015), Leukoplakia of vulva, Long term (current) use of systemic steroids (08/27/2019), Malignant neoplasm of vertebral column (Multi) (09/09/2024), Other chest pain (10/16/2019), Other conditions influencing health status (10/25/2019), Other disturbances of skin sensation (05/19/2015), Other malaise (08/23/2018), Other pericardial effusion (noninflammatory) ([facility]) (04/07/2015), Personal history of malignant neoplasm of " bone (07/11/2019), Personal history of other diseases of the respiratory system (04/01/2015), Personal history of other diseases of the respiratory system (04/05/2017), Personal history of other diseases of the respiratory system (01/03/2014), Personal history of other specified conditions (03/30/2015), Personal history of urinary (tract) infections (06/23/2015), Personal history of urinary (tract) infections (04/05/2017), Unspecified visual disturbance (07/07/2020), and Urinary tract infection, site not specified (05/13/2021).    Surgical History  She has a past surgical history that includes Spine surgery (06/03/2013); Other surgical history (06/03/2013); Breast surgery (06/03/2013); Colonoscopy (06/03/2013); Other surgical history (10/19/2020); Tubal ligation (05/19/2015); Other surgical history (03/13/2017); Other surgical history (03/13/2017); MR angio head wo IV contrast (07/27/2020); MR angio neck wo IV contrast (07/27/2020); MR angio head wo IV contrast (05/18/2017); MR angio neck wo IV contrast (05/18/2017); CT angio neck (12/09/2022); CT angio head w and wo IV contrast (12/09/2022); Breast lumpectomy (Right); and Cataract extraction.     Social History  She reports that she has never smoked. She has never used smokeless tobacco. She reports current alcohol use. She reports current drug use. Drug: Marijuana.    Family History  Family History   Problem Relation Name Age of Onset    Hypertension Mother      Dementia Mother      Hypertension Maternal Grandmother      Kidney disease Maternal Grandmother      Heart attack Maternal Grandfather      Alzheimer's disease Mother's Sister      Breast cancer Mother's Sister  40        Allergies  Lovastatin, Codeine, Beclomethasone, Omeprazole, Hydrocortisone, and Penicillins    Review of Systems    CONSTITUTIONAL: Denies weight loss, fever and chills.    HEENT: Denies changes in vision and hearing.  Positive for headaches    RESPIRATORY: Denies SOB and cough.    CV:  Denies palpitations and CP.    GI: Denies abdominal pain, nausea, vomiting and diarrhea.    : Denies dysuria and urinary frequency.    MSK: Denies myalgia and joint pain.    SKIN: Denies rash and pruritus.    VASC: Denies claudication, ischemic rest pain, or open wounds or sores    NEUROLOGICAL: Denies syncope.  Positive for feeling off balance    PSYCHIATRIC: Denies recent changes in mood. Denies anxiety and depression.     Physical Exam    General: Pt is alert and oriented x 3. Pleasant, conversive  HEENT: Head is atraumatic, normocephalic.   Cardiac: Normal S1-S2.  Regular rate and rhythm.  No murmurs.  Respiratory: Lungs clear to auscultation.  No adventitious sounds.  Abdomen: Soft, nondistended, nontender.  Bowel sounds x4 quadrants.  Pulse exam: Palpable brachial and radial pulses bilaterally.   Extremities: No significant edema noted.  Extremities are warm to the touch and normal in color.  No open wounds or sores.  Neuro: Moves all extremities spontaneously.  No focal deficits.  Psych: Appropriate affect.  Answers questions appropriately.     Last Recorded Vitals  /76   Pulse 76   Wt 87.1 kg (192 lb)   SpO2 97%     Relevant Results    Current Outpatient Medications   Medication Instructions    acetaminophen (TYLENOL) 500 mg, Every 6 hours PRN    amLODIPine (NORVASC) 2.5 mg, oral, 2 times daily    calcium carbonate-vitamin D3 600 mg-20 mcg (800 unit) tablet 1 tablet, Daily    cetirizine (ZyrTEC) 10 mg tablet 1 tablet, Daily    cholecalciferol (VITAMIN D-3) 5,000 Units, Daily    cyanocobalamin (Vitamin B-12) 100 mcg tablet 1 tablet, Daily    famotidine (PEPCID) 40 mg, oral, Nightly    furosemide (Lasix) 20 mg tablet TAKE 1 TABLET BY MOUTH EVERY DAY AS NEEDED FOR EDEMA    LORazepam (ATIVAN) 0.5 mg, Daily PRN    MAGNESIUM GLUCONATE ORAL 500 mg, Daily    medical cannabis 1 each, Daily    pravastatin (Pravachol) 10 mg tablet TAKE 1 TABLET BY MOUTH AT BEDTIME    triamcinolone (Kenalog) 0.1 % lotion  Daily    venlafaxine XR (Effexor-XR) 37.5 mg 24 hr capsule TAKE ONE ALONG WITH 150 MG DAILY    venlafaxine XR (EFFEXOR-XR) 150 mg, Daily    vitamin E mixed 400 unit capsule 1 capsule, Daily           Assessment/Plan   Right vertebral artery occlusion  Headaches    In review of the records, the patient has had a right vertebral artery occlusion since at least 2022.  Do not feel that further testing or vascular workup is warranted in this case.  She does describe headaches that have been ongoing.  She is under the care of neurology for the headaches.  She recently had a 2 temporal artery biopsies, both of which were negative.    Follow-up as needed.  Continue pravastatin.             Luis Carlos Wu, APRN-CNP

## 2025-01-27 NOTE — PATIENT INSTRUCTIONS
Roseanne    It was really nice to meet you!  Thank you for choosing  vascular surgery for your care.    We discussed the fact that you have an occluded (blocked) right vertebral artery.  We do not know how long the artery has been blocked and therefore no intervention to open it up is warranted or helpful in your case.  You can follow-up on an as-needed basis.

## 2025-01-29 ENCOUNTER — APPOINTMENT (OUTPATIENT)
Dept: RADIATION ONCOLOGY | Facility: HOSPITAL | Age: 75
End: 2025-01-29
Payer: MEDICARE

## 2025-01-30 ENCOUNTER — TELEPHONE (OUTPATIENT)
Dept: PRIMARY CARE | Facility: CLINIC | Age: 75
End: 2025-01-30
Payer: MEDICARE

## 2025-01-30 NOTE — TELEPHONE ENCOUNTER
Pt has the stomach flu, she is shaking no controllable wants to know if she can take larzapem to stop the shaking

## 2025-02-28 ENCOUNTER — TRANSCRIBE ORDERS (OUTPATIENT)
Dept: OPERATING ROOM | Facility: HOSPITAL | Age: 75
End: 2025-02-28
Payer: MEDICARE

## 2025-02-28 ENCOUNTER — HOSPITAL ENCOUNTER (OUTPATIENT)
Facility: HOSPITAL | Age: 75
Setting detail: OUTPATIENT SURGERY
End: 2025-02-28
Attending: ORTHOPAEDIC SURGERY | Admitting: ORTHOPAEDIC SURGERY
Payer: MEDICARE

## 2025-02-28 DIAGNOSIS — M17.12 UNILATERAL PRIMARY OSTEOARTHRITIS, LEFT KNEE: Primary | ICD-10-CM

## 2025-03-21 ENCOUNTER — CLINICAL SUPPORT (OUTPATIENT)
Dept: PREADMISSION TESTING | Facility: HOSPITAL | Age: 75
End: 2025-03-21
Payer: MEDICARE

## 2025-03-21 RX ORDER — DICLOFENAC SODIUM 10 MG/G
4 GEL TOPICAL 2 TIMES DAILY PRN
COMMUNITY

## 2025-03-21 RX ORDER — IBUPROFEN 200 MG
400 TABLET ORAL DAILY PRN
COMMUNITY

## 2025-03-21 ASSESSMENT — ENCOUNTER SYMPTOMS
CHILLS: 0
EYE DISCHARGE: 0
DIARRHEA: 0
CONFUSION: 0
ARTHRALGIAS: 1
NAUSEA: 0
SHORTNESS OF BREATH: 0
SINUS CONGESTION: 0
ABDOMINAL PAIN: 0
COUGH: 0
VOMITING: 0
RHINORRHEA: 0
AGITATION: 0
FEVER: 0
WOUND: 0

## 2025-03-21 NOTE — PERIOPERATIVE NURSING NOTE
PAT nurse screening call completed; chart updated per information provided by patient. Education/ instructions reviewed; patient denied further questions or concerns. Patient aware of upcoming PAT and OR dates.

## 2025-03-21 NOTE — CPM/PAT NURSE NOTE
CPM/PAT Nurse Note      Name: Roseanne Lira (Roseanne Lira)  /Age: 1950/74 y.o.       Past Medical History:   Diagnosis Date    Age-related nuclear cataract, left eye 2017    Age-related nuclear cataract of left eye    Age-related nuclear cataract, right eye 2017    Age-related nuclear cataract of right eye    Allergy status to unspecified drugs, medicaments and biological substances     History of seasonal allergies    Arthritis     Asthma     Breast cancer in situ     Cortical age-related cataract, left eye 2017    Cortical age-related cataract of left eye    Cortical age-related cataract, right eye 2017    Cortical age-related cataract of right eye    Depression     Diarrhea, unspecified 2018    Acute diarrhea    Dizziness and giddiness 06/10/2020    Postural dizziness with presyncope    Elevated blood-pressure reading, without diagnosis of hypertension 10/16/2019    Prehypertension    Encounter for general adult medical examination without abnormal findings 2020    Preventative health care    Encounter for other preprocedural examination     Preoperative exam for gynecologic surgery    Encounter for screening mammogram for malignant neoplasm of breast 2013    Visit for screening mammogram    Epigastric pain 2020    Abdominal pain, acute, epigastric    Fibromyalgia, primary     GERD (gastroesophageal reflux disease)     Headache, unspecified 10/19/2020    Sinus headache    Hyperlipidemia     Hypertension     Intraductal carcinoma in situ of unspecified breast 2015    DCIS (ductal carcinoma in situ) of breast    Joint pain     Leukoplakia of vulva     Lichen sclerosus of female genitalia    Long term (current) use of systemic steroids 2019    Current chronic use of systemic steroids    Malignant neoplasm of vertebral column (Multi) 2024    Meningioma (Multi)     Nephrolithiasis     Other chest pain 10/16/2019    Atypical chest pain    Other  conditions influencing health status 10/25/2019    Chronic use of steroids    Other disturbances of skin sensation 05/19/2015    Burning sensation    Other malaise 08/23/2018    Malaise and fatigue    Other pericardial effusion (noninflammatory) ([facility]) 04/07/2015    Pericardial effusion    Personal history of malignant neoplasm of bone 07/11/2019    History of malignant neoplasm of bone    Personal history of other diseases of the respiratory system 04/01/2015    History of asthma    Personal history of other diseases of the respiratory system 04/05/2017    History of acute bronchitis    Personal history of other diseases of the respiratory system 01/03/2014    History of upper respiratory infection    Personal history of other specified conditions 03/30/2015    History of abdominal pain    Personal history of urinary (tract) infections 06/23/2015    History of urinary tract infection    Personal history of urinary (tract) infections 04/05/2017    History of acute cystitis    Polymyalgia rheumatica (Multi)     Unspecified visual disturbance 07/07/2020    Visual disturbances    Urinary tract infection, site not specified 05/13/2021    Acute UTI    Uterine cancer (Multi)        Past Surgical History:   Procedure Laterality Date    BREAST LUMPECTOMY Right     with radiation in 2011    BREAST SURGERY  06/03/2013    Breast Surgery    CATARACT EXTRACTION      COLONOSCOPY  06/03/2013    Complete Colonoscopy    CT ANGIO NECK  12/09/2022    CT NECK ANGIO W AND WO IV CONTRAST 12/9/2022 [facility]    CT HEAD ANGIO W AND WO IV CONTRAST  12/09/2022    CT HEAD ANGIO W AND WO IV CONTRAST 12/9/2022 [facility]    HYSTERECTOMY      MR HEAD ANGIO WO IV CONTRAST  07/27/2020    MR HEAD ANGIO WO IV CONTRAST 7/27/2020 [facility]    MR HEAD ANGIO WO IV CONTRAST  05/18/2017    MR HEAD ANGIO WO IV CONTRAST 5/18/2017 [facility]    MR NECK ANGIO WO IV CONTRAST  07/27/2020    [place] NECK ANGIO WO IV  [place] [facility]    MR NECK ANGIO WO IV CONTRAST  05/18/2017    MR NECK ANGIO WO IV CONTRAST [place] [facility]    OTHER SURGICAL HISTORY  06/03/2013    Craniotomy Supratentorial Excision Of Meningioma    OTHER SURGICAL HISTORY  10/19/2020    Cataract surgery    OTHER SURGICAL HISTORY  03/13/2017    Arthrodesis Cervical c2-5 fusion    OTHER SURGICAL HISTORY  03/13/2017    Craniotomy Infratentorial Excision Of Meningioma    SPINE SURGERY  06/03/2013    Spine Repair fused for chordoma 1968    TUBAL LIGATION  05/19/2015    Tubal Ligation       Patient Sexual activity questions deferred to the physician.    Family History   Problem Relation Name Age of Onset    Hypertension Mother      Dementia Mother      Hypertension Maternal Grandmother      Kidney disease Maternal Grandmother      Kidney disease Maternal Grandfather      Heart attack Maternal Grandfather      Alzheimer's disease Mother's Sister      Breast cancer Mother's Sister  40       Allergies   Allergen Reactions    Lovastatin GI Upset    Codeine Nausea Only and Nausea/vomiting    Beclomethasone Unknown    Omeprazole Dermatitis    Other Itching     Hair dye    Hydrocortisone Unknown     (+) on allergy test    Penicillins Itching       Prior to Admission medications    Medication Sig Start Date End Date Taking? Authorizing Provider   acetaminophen (Tylenol) 500 mg tablet Take 1 tablet (500 mg) by mouth every 6 hours if needed for mild pain (1 - 3).  Patient taking differently: Take 2 tablets (1,000 mg) by mouth every 6 hours if needed for mild pain (1 - 3).   Yes Historical Provider, MD   amLODIPine (Norvasc) 2.5 mg tablet TAKE 1 TABLET (2.5 MG) BY MOUTH 2 TIMES A DAY. 10/21/24  Yes Page Lopez MD   calcium carbonate-vitamin D3 600 mg-20 mcg (800 unit) tablet Take 1 tablet by mouth once daily in the morning.   Yes Historical Provider, MD   cetirizine (ZyrTEC) 10 mg tablet Take 1 tablet (10 mg) by mouth once daily in  the morning. 9/16/13  Yes Historical Provider, MD   cholecalciferol (Vitamin D-3) 5,000 Units tablet Take 1 tablet (5,000 Units) by mouth early in the morning..   Yes Historical Provider, MD   cyanocobalamin (Vitamin B-12) 100 mcg tablet Take 1 tablet (100 mcg) by mouth once daily in the morning.   Yes Historical Provider, MD   diclofenac sodium (Voltaren Arthritis Pain) 1 % gel Apply 4.5 inches (4 g) topically 2 times a day as needed.   Yes Historical Provider, MD   famotidine (Pepcid) 40 mg tablet Take 1 tablet (40 mg) by mouth once daily at bedtime.  Patient taking differently: Take 1 tablet (40 mg) by mouth once daily at bedtime. 20mg every morning and 40mg at night 7/11/24  Yes Page Lopez MD   furosemide (Lasix) 20 mg tablet TAKE 1 TABLET BY MOUTH EVERY DAY AS NEEDED FOR EDEMA 11/20/24  Yes Page Lopez MD   MAGNESIUM GLUCONATE ORAL Take 500 mg by mouth once daily in the morning.   Yes Historical Provider, MD   medical cannabis Take 1 each by mouth once daily at bedtime.   Yes Historical Provider, MD   pravastatin (Pravachol) 10 mg tablet TAKE 1 TABLET BY MOUTH AT BEDTIME 10/21/24  Yes Page Lopez MD   triamcinolone (Kenalog) 0.1 % lotion Apply topically early in the morning..   Yes Historical Provider, MD   venlafaxine XR (Effexor-XR) 150 mg 24 hr capsule Take 1 capsule (150 mg) by mouth once daily in the morning. With 37.5 tablet  every morning   Yes Historical Provider, MD   venlafaxine XR (Effexor-XR) 37.5 mg 24 hr capsule TAKE ONE ALONG WITH 150 MG DAILY 7/24/23  Yes Historical Provider, MD   ALBUTEROL INHL Inhale 2 puffs 4 times a day as needed.    Historical Provider, MD   ibuprofen 200 mg tablet Take 2 tablets (400 mg) by mouth once daily as needed for mild pain (1 - 3) or moderate pain (4 - 6).    Historical Provider, MD   LORazepam (Ativan) 0.5 mg tablet Take 1 tablet (0.5 mg) by mouth once daily as needed.  Patient not taking: Reported on 3/21/2025    Historical Provider,  MD   vitamin E mixed 400 unit capsule Take 1 capsule by mouth once daily in the morning.    Historical Provider, MD LANDIN ROS:   Constitutional:    Pt reports she had norovirus end of January and again 3-10-25, today 3-21-25 states no remaining symptoms.   no fever   no chills  Neuro/Psych:    no agitation   no confusion  Eyes:    no discharge  Ears:    no ear discharge  Nose:    no nasal discharge   no sinus congestion  Mouth:   Throat:    no throat pain  Neck:   Cardio:    no chest pain  Respiratory:    no cough   no shortness of breath  Endocrine:   GI:    no abdominal pain   no diarrhea   no nausea   no vomiting  :   Musculoskeletal:    PT reports pain in both knees   arthralgias  Hematologic:    no history of blood transfusion   no blood clots  Skin:   no sores/wound   no rash      DASI Risk Score    No data to display       Caprini DVT Assessment    No data to display       Modified Frailty Index    No data to display       [id number] Stroke Risk Points  Current as of just now        N/A 0 to 9 Points:      Last Change: N/A          The [id number] risk score (Lip GH, et al. 2009. © 2010 [employer]) quantifies the risk of stroke for a patient with atrial fibrillation. For patients without atrial fibrillation or under the age of 18 this score appears as N/A. Higher score values generally indicate higher risk of stroke.        This score is not applicable to this patient. Components are not calculated.          Revised Cardiac Risk Index    No data to display       Apfel Simplified Score    No data to display       Risk Analysis Index Results This Encounter    No data found in the last 10 encounters.       Stop Bang Score      Flowsheet Row Clinical Support from 3/21/2025 in [facility]   Do you snore loudly? 0 filed at 03/21/2025 1331   Do you often feel tired or fatigued after your sleep? 1 filed at 03/21/2025 1335   Has anyone ever observed you stop  breathing in your sleep? 0 filed at 03/21/2025 1335   Do you have or are you being treated for high blood pressure? 1 filed at 03/21/2025 1335   Recent BMI (Calculated) 32.9 filed at 03/21/2025 1335   Is BMI greater than 35 kg/m2? 0=No filed at 03/21/2025 1335   Age older than 50 years old? 1=Yes filed at 03/21/2025 1335   Gender - Male 0=No filed at 03/21/2025 1335          Prodigy: High Risk  Total Score: 12              Prodigy Age Score           ARISCAT Score for Postoperative Pulmonary Complications    No data to display       Min Perioperative Risk for Myocardial Infarction or Cardiac Arrest ([place]TE)    No data to display         Nurse Plan of Action:

## 2025-03-27 ENCOUNTER — OFFICE VISIT (OUTPATIENT)
Dept: ORTHOPEDIC SURGERY | Facility: CLINIC | Age: 75
End: 2025-03-27
Payer: MEDICARE

## 2025-03-27 ENCOUNTER — APPOINTMENT (OUTPATIENT)
Dept: PRIMARY CARE | Facility: CLINIC | Age: 75
End: 2025-03-27
Payer: MEDICARE

## 2025-03-27 DIAGNOSIS — M17.12 PRIMARY OSTEOARTHRITIS OF LEFT KNEE: Primary | ICD-10-CM

## 2025-03-27 DIAGNOSIS — Z47.1 AFTERCARE FOLLOWING RIGHT KNEE JOINT REPLACEMENT SURGERY: ICD-10-CM

## 2025-03-27 DIAGNOSIS — Z96.651 AFTERCARE FOLLOWING RIGHT KNEE JOINT REPLACEMENT SURGERY: ICD-10-CM

## 2025-03-27 DIAGNOSIS — M25.561 RIGHT KNEE PAIN, UNSPECIFIED CHRONICITY: ICD-10-CM

## 2025-03-27 PROCEDURE — 99214 OFFICE O/P EST MOD 30 MIN: CPT | Performed by: ORTHOPAEDIC SURGERY

## 2025-03-27 PROCEDURE — 1123F ACP DISCUSS/DSCN MKR DOCD: CPT | Performed by: ORTHOPAEDIC SURGERY

## 2025-03-28 ENCOUNTER — PRE-ADMISSION TESTING (OUTPATIENT)
Dept: PREADMISSION TESTING | Facility: HOSPITAL | Age: 75
End: 2025-03-28
Payer: MEDICARE

## 2025-03-28 ENCOUNTER — HOSPITAL ENCOUNTER (INPATIENT)
Facility: HOSPITAL | Age: 75
LOS: 1 days | Discharge: HOME | End: 2025-03-29
Attending: EMERGENCY MEDICINE | Admitting: FAMILY MEDICINE
Payer: MEDICARE

## 2025-03-28 ENCOUNTER — HOSPITAL ENCOUNTER (OUTPATIENT)
Dept: RADIOLOGY | Facility: HOSPITAL | Age: 75
Discharge: HOME | End: 2025-03-28
Payer: MEDICARE

## 2025-03-28 ENCOUNTER — EDUCATION (OUTPATIENT)
Dept: ORTHOPEDIC SURGERY | Facility: HOSPITAL | Age: 75
End: 2025-03-28
Payer: MEDICARE

## 2025-03-28 ENCOUNTER — APPOINTMENT (OUTPATIENT)
Dept: CARDIOLOGY | Facility: HOSPITAL | Age: 75
End: 2025-03-28
Payer: MEDICARE

## 2025-03-28 ENCOUNTER — APPOINTMENT (OUTPATIENT)
Dept: RADIOLOGY | Facility: HOSPITAL | Age: 75
End: 2025-03-28
Payer: MEDICARE

## 2025-03-28 DIAGNOSIS — I48.91 ATRIAL FIBRILLATION WITH RVR (MULTI): Primary | ICD-10-CM

## 2025-03-28 DIAGNOSIS — M17.12 UNILATERAL PRIMARY OSTEOARTHRITIS, LEFT KNEE: ICD-10-CM

## 2025-03-28 DIAGNOSIS — K21.9 GASTRO-ESOPHAGEAL REFLUX DISEASE WITHOUT ESOPHAGITIS: ICD-10-CM

## 2025-03-28 DIAGNOSIS — R94.31 ABNORMAL ELECTROCARDIOGRAM (ECG) (EKG): ICD-10-CM

## 2025-03-28 DIAGNOSIS — R00.0 TACHYCARDIA: Primary | ICD-10-CM

## 2025-03-28 LAB
ANION GAP SERPL CALC-SCNC: 14 MMOL/L (ref 10–20)
ATRIAL RATE: 136 BPM
BASOPHILS # BLD AUTO: 0.07 X10*3/UL (ref 0–0.1)
BASOPHILS NFR BLD AUTO: 0.7 %
BNP SERPL-MCNC: 193 PG/ML (ref 0–99)
BUN SERPL-MCNC: 18 MG/DL (ref 6–23)
CALCIUM SERPL-MCNC: 9.2 MG/DL (ref 8.6–10.3)
CARDIAC TROPONIN I PNL SERPL HS: 4 NG/L (ref 0–13)
CARDIAC TROPONIN I PNL SERPL HS: 6 NG/L (ref 0–13)
CHLORIDE SERPL-SCNC: 107 MMOL/L (ref 98–107)
CO2 SERPL-SCNC: 23 MMOL/L (ref 21–32)
CREAT SERPL-MCNC: 0.74 MG/DL (ref 0.5–1.05)
EGFRCR SERPLBLD CKD-EPI 2021: 84 ML/MIN/1.73M*2
EOSINOPHIL # BLD AUTO: 0.13 X10*3/UL (ref 0–0.4)
EOSINOPHIL NFR BLD AUTO: 1.4 %
ERYTHROCYTE [DISTWIDTH] IN BLOOD BY AUTOMATED COUNT: 13.9 % (ref 11.5–14.5)
GLUCOSE SERPL-MCNC: 80 MG/DL (ref 74–99)
HCT VFR BLD AUTO: 41.8 % (ref 36–46)
HGB BLD-MCNC: 13.9 G/DL (ref 12–16)
IMM GRANULOCYTES # BLD AUTO: 0.02 X10*3/UL (ref 0–0.5)
IMM GRANULOCYTES NFR BLD AUTO: 0.2 % (ref 0–0.9)
INR PPP: 1.1 (ref 0.9–1.1)
LYMPHOCYTES # BLD AUTO: 2.2 X10*3/UL (ref 0.8–3)
LYMPHOCYTES NFR BLD AUTO: 22.9 %
MAGNESIUM SERPL-MCNC: 2.17 MG/DL (ref 1.6–2.4)
MCH RBC QN AUTO: 27.6 PG (ref 26–34)
MCHC RBC AUTO-ENTMCNC: 33.3 G/DL (ref 32–36)
MCV RBC AUTO: 83 FL (ref 80–100)
MONOCYTES # BLD AUTO: 0.77 X10*3/UL (ref 0.05–0.8)
MONOCYTES NFR BLD AUTO: 8 %
NEUTROPHILS # BLD AUTO: 6.41 X10*3/UL (ref 1.6–5.5)
NEUTROPHILS NFR BLD AUTO: 66.8 %
NRBC BLD-RTO: 0 /100 WBCS (ref 0–0)
P OFFSET: 162 MS
P ONSET: 142 MS
PLATELET # BLD AUTO: 404 X10*3/UL (ref 150–450)
POTASSIUM SERPL-SCNC: 3.7 MMOL/L (ref 3.5–5.3)
PROTHROMBIN TIME: 12.3 SECONDS (ref 9.8–12.4)
Q ONSET: 225 MS
QRS COUNT: 25 BEATS
QRS DURATION: 84 MS
QT INTERVAL: 312 MS
QTC CALCULATION(BAZETT): 494 MS
QTC FREDERICIA: 424 MS
R AXIS: 0 DEGREES
RBC # BLD AUTO: 5.04 X10*6/UL (ref 4–5.2)
SODIUM SERPL-SCNC: 140 MMOL/L (ref 136–145)
T AXIS: 209 DEGREES
T OFFSET: 381 MS
TSH SERPL-ACNC: 1.03 MIU/L (ref 0.44–3.98)
VENTRICULAR RATE: 151 BPM
WBC # BLD AUTO: 9.6 X10*3/UL (ref 4.4–11.3)

## 2025-03-28 PROCEDURE — G0378 HOSPITAL OBSERVATION PER HR: HCPCS

## 2025-03-28 PROCEDURE — 84443 ASSAY THYROID STIM HORMONE: CPT | Performed by: EMERGENCY MEDICINE

## 2025-03-28 PROCEDURE — 84484 ASSAY OF TROPONIN QUANT: CPT | Performed by: EMERGENCY MEDICINE

## 2025-03-28 PROCEDURE — 83735 ASSAY OF MAGNESIUM: CPT | Performed by: EMERGENCY MEDICINE

## 2025-03-28 PROCEDURE — 93005 ELECTROCARDIOGRAM TRACING: CPT

## 2025-03-28 PROCEDURE — 96376 TX/PRO/DX INJ SAME DRUG ADON: CPT

## 2025-03-28 PROCEDURE — 96374 THER/PROPH/DIAG INJ IV PUSH: CPT | Mod: 59

## 2025-03-28 PROCEDURE — 2500000005 HC RX 250 GENERAL PHARMACY W/O HCPCS: Performed by: EMERGENCY MEDICINE

## 2025-03-28 PROCEDURE — 2500000004 HC RX 250 GENERAL PHARMACY W/ HCPCS (ALT 636 FOR OP/ED): Performed by: EMERGENCY MEDICINE

## 2025-03-28 PROCEDURE — 71045 X-RAY EXAM CHEST 1 VIEW: CPT

## 2025-03-28 PROCEDURE — 71045 X-RAY EXAM CHEST 1 VIEW: CPT | Performed by: RADIOLOGY

## 2025-03-28 PROCEDURE — 85025 COMPLETE CBC W/AUTO DIFF WBC: CPT | Performed by: EMERGENCY MEDICINE

## 2025-03-28 PROCEDURE — 36415 COLL VENOUS BLD VENIPUNCTURE: CPT | Performed by: EMERGENCY MEDICINE

## 2025-03-28 PROCEDURE — 2060000001 HC INTERMEDIATE ICU ROOM DAILY

## 2025-03-28 PROCEDURE — 80048 BASIC METABOLIC PNL TOTAL CA: CPT | Performed by: EMERGENCY MEDICINE

## 2025-03-28 PROCEDURE — 83880 ASSAY OF NATRIURETIC PEPTIDE: CPT | Performed by: EMERGENCY MEDICINE

## 2025-03-28 PROCEDURE — 99291 CRITICAL CARE FIRST HOUR: CPT | Mod: 25 | Performed by: EMERGENCY MEDICINE

## 2025-03-28 PROCEDURE — 85610 PROTHROMBIN TIME: CPT | Performed by: EMERGENCY MEDICINE

## 2025-03-28 RX ORDER — DILTIAZEM HCL/D5W 125 MG/125
5-15 PLASTIC BAG, INJECTION (ML) INTRAVENOUS CONTINUOUS
Status: DISCONTINUED | OUTPATIENT
Start: 2025-03-28 | End: 2025-03-29

## 2025-03-28 RX ORDER — DILTIAZEM HYDROCHLORIDE 5 MG/ML
20 INJECTION INTRAVENOUS ONCE
Status: COMPLETED | OUTPATIENT
Start: 2025-03-28 | End: 2025-03-28

## 2025-03-28 RX ORDER — DIGOXIN 0.25 MG/ML
500 INJECTION INTRAMUSCULAR; INTRAVENOUS ONCE
Status: COMPLETED | OUTPATIENT
Start: 2025-03-28 | End: 2025-03-28

## 2025-03-28 RX ORDER — HEPARIN SODIUM 10000 [USP'U]/100ML
0-4500 INJECTION, SOLUTION INTRAVENOUS CONTINUOUS
Status: DISCONTINUED | OUTPATIENT
Start: 2025-03-28 | End: 2025-03-29

## 2025-03-28 RX ADMIN — DILTIAZEM HYDROCHLORIDE 20 MG: 5 INJECTION, SOLUTION INTRAVENOUS at 13:53

## 2025-03-28 RX ADMIN — DIGOXIN 500 MCG: 250 INJECTION, SOLUTION INTRAMUSCULAR; INTRAVENOUS; PARENTERAL at 19:34

## 2025-03-28 RX ADMIN — Medication 5 MG/HR: at 16:25

## 2025-03-28 RX ADMIN — HEPARIN SODIUM 1600 UNITS/HR: 10000 INJECTION, SOLUTION INTRAVENOUS at 17:17

## 2025-03-28 SDOH — SOCIAL STABILITY: SOCIAL INSECURITY: WITHIN THE LAST YEAR, HAVE YOU BEEN AFRAID OF YOUR PARTNER OR EX-PARTNER?: NO

## 2025-03-28 SDOH — SOCIAL STABILITY: SOCIAL INSECURITY: ARE YOU OR HAVE YOU BEEN THREATENED OR ABUSED PHYSICALLY, EMOTIONALLY, OR SEXUALLY BY ANYONE?: NO

## 2025-03-28 SDOH — SOCIAL STABILITY: SOCIAL INSECURITY: HAVE YOU HAD ANY THOUGHTS OF HARMING ANYONE ELSE?: NO

## 2025-03-28 SDOH — SOCIAL STABILITY: SOCIAL INSECURITY
WITHIN THE LAST YEAR, HAVE YOU BEEN RAPED OR FORCED TO HAVE ANY KIND OF SEXUAL ACTIVITY BY YOUR PARTNER OR EX-PARTNER?: NO

## 2025-03-28 SDOH — SOCIAL STABILITY: SOCIAL INSECURITY: WITHIN THE LAST YEAR, HAVE YOU BEEN HUMILIATED OR EMOTIONALLY ABUSED IN OTHER WAYS BY YOUR PARTNER OR EX-PARTNER?: NO

## 2025-03-28 SDOH — ECONOMIC STABILITY: FOOD INSECURITY: WITHIN THE PAST 12 MONTHS, YOU WORRIED THAT YOUR FOOD WOULD RUN OUT BEFORE YOU GOT THE MONEY TO BUY MORE.: NEVER TRUE

## 2025-03-28 SDOH — ECONOMIC STABILITY: INCOME INSECURITY: IN THE PAST 12 MONTHS HAS THE ELECTRIC, GAS, OIL, OR WATER COMPANY THREATENED TO SHUT OFF SERVICES IN YOUR HOME?: NO

## 2025-03-28 SDOH — ECONOMIC STABILITY: FOOD INSECURITY: WITHIN THE PAST 12 MONTHS, THE FOOD YOU BOUGHT JUST DIDN'T LAST AND YOU DIDN'T HAVE MONEY TO GET MORE.: NEVER TRUE

## 2025-03-28 SDOH — SOCIAL STABILITY: SOCIAL INSECURITY: DO YOU FEEL ANYONE HAS EXPLOITED OR TAKEN ADVANTAGE OF YOU FINANCIALLY OR OF YOUR PERSONAL PROPERTY?: NO

## 2025-03-28 SDOH — HEALTH STABILITY: MENTAL HEALTH
DO YOU FEEL STRESS - TENSE, RESTLESS, NERVOUS, OR ANXIOUS, OR UNABLE TO SLEEP AT NIGHT BECAUSE YOUR MIND IS TROUBLED ALL THE TIME - THESE DAYS?: ONLY A LITTLE

## 2025-03-28 SDOH — SOCIAL STABILITY: SOCIAL INSECURITY
WITHIN THE LAST YEAR, HAVE YOU BEEN KICKED, HIT, SLAPPED, OR OTHERWISE PHYSICALLY HURT BY YOUR PARTNER OR EX-PARTNER?: NO

## 2025-03-28 SDOH — SOCIAL STABILITY: SOCIAL INSECURITY: HAVE YOU HAD THOUGHTS OF HARMING ANYONE ELSE?: NO

## 2025-03-28 SDOH — HEALTH STABILITY: PHYSICAL HEALTH
HOW OFTEN DO YOU NEED TO HAVE SOMEONE HELP YOU WHEN YOU READ INSTRUCTIONS, PAMPHLETS, OR OTHER WRITTEN MATERIAL FROM YOUR DOCTOR OR PHARMACY?: NEVER

## 2025-03-28 SDOH — SOCIAL STABILITY: SOCIAL INSECURITY: ARE THERE ANY APPARENT SIGNS OF INJURIES/BEHAVIORS THAT COULD BE RELATED TO ABUSE/NEGLECT?: NO

## 2025-03-28 SDOH — SOCIAL STABILITY: SOCIAL INSECURITY: DO YOU FEEL UNSAFE GOING BACK TO THE PLACE WHERE YOU ARE LIVING?: NO

## 2025-03-28 SDOH — SOCIAL STABILITY: SOCIAL INSECURITY: DOES ANYONE TRY TO KEEP YOU FROM HAVING/CONTACTING OTHER FRIENDS OR DOING THINGS OUTSIDE YOUR HOME?: NO

## 2025-03-28 SDOH — SOCIAL STABILITY: SOCIAL INSECURITY: HAS ANYONE EVER THREATENED TO HURT YOUR FAMILY OR YOUR PETS?: NO

## 2025-03-28 SDOH — SOCIAL STABILITY: SOCIAL INSECURITY: ABUSE: ADULT

## 2025-03-28 SDOH — SOCIAL STABILITY: SOCIAL INSECURITY: WERE YOU ABLE TO COMPLETE ALL THE BEHAVIORAL HEALTH SCREENINGS?: YES

## 2025-03-28 ASSESSMENT — ACTIVITIES OF DAILY LIVING (ADL)
ADEQUATE_TO_COMPLETE_ADL: YES
JUDGMENT_ADEQUATE_SAFELY_COMPLETE_DAILY_ACTIVITIES: YES
FEEDING YOURSELF: INDEPENDENT
HEARING - RIGHT EAR: FUNCTIONAL
WALKS IN HOME: INDEPENDENT
GROOMING: INDEPENDENT
TOILETING: INDEPENDENT
LACK_OF_TRANSPORTATION: NO
PATIENT'S MEMORY ADEQUATE TO SAFELY COMPLETE DAILY ACTIVITIES?: YES
DRESSING YOURSELF: INDEPENDENT
HEARING - LEFT EAR: FUNCTIONAL
BATHING: INDEPENDENT

## 2025-03-28 ASSESSMENT — COGNITIVE AND FUNCTIONAL STATUS - GENERAL
PATIENT BASELINE BEDBOUND: NO
MOBILITY SCORE: 24
DAILY ACTIVITIY SCORE: 24

## 2025-03-28 ASSESSMENT — LIFESTYLE VARIABLES
AUDIT-C TOTAL SCORE: 1
SKIP TO QUESTIONS 9-10: 1
HOW OFTEN DO YOU HAVE 6 OR MORE DRINKS ON ONE OCCASION: NEVER
SUBSTANCE_ABUSE_PAST_12_MONTHS: YES
HOW OFTEN DO YOU HAVE A DRINK CONTAINING ALCOHOL: MONTHLY OR LESS
HOW MANY STANDARD DRINKS CONTAINING ALCOHOL DO YOU HAVE ON A TYPICAL DAY: 1 OR 2
PRESCIPTION_ABUSE_PAST_12_MONTHS: NO
AUDIT-C TOTAL SCORE: 1

## 2025-03-28 ASSESSMENT — CHA2DS2 SCORE
HYPERTENSION: YES
CHF OR LEFT VENTRICULAR DYSFUNCTION: NO
SEX: FEMALE
DIABETES: NO
AGE IN YEARS: 75+
CHA2D2S VASC SCORE: 4
VASCULAR DISEASE: NO
PRIOR STROKE OR TIA OR THROMBOEMBOLISM: NO

## 2025-03-28 ASSESSMENT — PAIN SCALES - GENERAL
PAINLEVEL_OUTOF10: 0 - NO PAIN
PAINLEVEL_OUTOF10: 0 - NO PAIN

## 2025-03-28 ASSESSMENT — PATIENT HEALTH QUESTIONNAIRE - PHQ9
SUM OF ALL RESPONSES TO PHQ9 QUESTIONS 1 & 2: 1
1. LITTLE INTEREST OR PLEASURE IN DOING THINGS: NOT AT ALL
2. FEELING DOWN, DEPRESSED OR HOPELESS: SEVERAL DAYS

## 2025-03-28 ASSESSMENT — COLUMBIA-SUICIDE SEVERITY RATING SCALE - C-SSRS
6. HAVE YOU EVER DONE ANYTHING, STARTED TO DO ANYTHING, OR PREPARED TO DO ANYTHING TO END YOUR LIFE?: NO
1. IN THE PAST MONTH, HAVE YOU WISHED YOU WERE DEAD OR WISHED YOU COULD GO TO SLEEP AND NOT WAKE UP?: NO
2. HAVE YOU ACTUALLY HAD ANY THOUGHTS OF KILLING YOURSELF?: NO

## 2025-03-28 ASSESSMENT — PAIN - FUNCTIONAL ASSESSMENT: PAIN_FUNCTIONAL_ASSESSMENT: 0-10

## 2025-03-28 NOTE — PERIOPERATIVE NURSING NOTE
"Patient escorted from waiting area to [facility] from by radiology personnel. Patient had complaint of \"racing heart, being profound.\"  Vitals obtained. 158/90 , , RR 18 and 97% spo2. 98.4 F. Patient had no complaints of dizziness, chest pain, or shortness of breath. Shortness of breath noted with exertion. Notified Almas Duran CNP. EKG ordered obtained. 12:20 EKG completed  and notified CNP. 12:22 [facility] called. 12:25 DR. Rios at bedside 12:40 patient released to [facility] with belongings. Patient notified spouse on phone.  "

## 2025-03-28 NOTE — PROGRESS NOTES
Pharmacy Medication History     Source of Information: Per Patient need all PM meds     Additional concerns with the patient's PTA list.   N/A  Notified Provider via Haiku : No    The following updates were made to the Prior to Admission medication list:     Medications ADDED:   N/A  Medications CHANGED:  N/A  Medications REMOVED:   N/A  Medications NOT TAKING:   Furosemide 20 mg tablet  Ibuprofen 200 mg tablet  Lorazepam 0.5 mg tablet      Allergy reviewed : Yes    Meds 2 Beds : No    Outpatient pharmacy confirmed and updated in chart : Yes    Pharmacy name: [facility]. Travis Arnulfo Harrell.    The list below reflectives the updated PTA list. Please review each medication in order reconciliation for additional clarification and justification.    Prior to Admission Medications   Prescriptions Last Dose Informant   ALBUTEROL INHL More than a month Self   Sig: Inhale 2 puffs 4 times a day as needed.   Patient not taking: Reported on 3/28/2025   LORazepam (Ativan) 0.5 mg tablet     Sig: Take 1 tablet (0.5 mg) by mouth once daily as needed.   Patient not taking: Reported on 3/21/2025   MAGNESIUM GLUCONATE ORAL 3/27/2025 Self   Sig: Take 500 mg by mouth once daily in the morning.   acetaminophen (Tylenol) 500 mg tablet 3/27/2025 Self   Sig: Take 1 tablet (500 mg) by mouth every 6 hours if needed for mild pain (1 - 3).   Patient taking differently: Take 2 tablets (1,000 mg) by mouth every 6 hours if needed for mild pain (1 - 3).   amLODIPine (Norvasc) 2.5 mg tablet 3/28/2025 Morning    Sig: TAKE 1 TABLET (2.5 MG) BY MOUTH 2 TIMES A DAY.   calcium carbonate-vitamin D3 600 mg-20 mcg (800 unit) tablet 3/27/2025 Morning Self   Sig: Take 1 tablet by mouth once daily in the morning.   cetirizine (ZyrTEC) 10 mg tablet 3/27/2025 Morning Self   Sig: Take 1 tablet (10 mg) by mouth once daily in the morning.   cholecalciferol (Vitamin D-3) 5,000 Units tablet 3/27/2025 Morning    Sig: Take 1 tablet (5,000 Units) by mouth early in the  morning..   cyanocobalamin (Vitamin B-12) 100 mcg tablet 3/27/2025 Morning Self   Sig: Take 1 tablet (100 mcg) by mouth once daily in the morning.   diclofenac sodium (Voltaren Arthritis Pain) 1 % gel Unknown Self   Sig: Apply 4.5 inches (4 g) topically 2 times a day as needed.   famotidine (Pepcid) 40 mg tablet 3/27/2025 Self   Sig: Take 1 tablet (40 mg) by mouth once daily at bedtime.   Patient taking differently: Take 1 tablet (40 mg) by mouth once daily at bedtime. 20mg every morning and 40mg at night   furosemide (Lasix) 20 mg tablet Not Taking    Sig: TAKE 1 TABLET BY MOUTH EVERY DAY AS NEEDED FOR EDEMA   Patient not taking: Reported on 3/28/2025   ibuprofen 200 mg tablet Not Taking Self   Sig: Take 2 tablets (400 mg) by mouth once daily as needed for mild pain (1 - 3) or moderate pain (4 - 6).   Patient not taking: Reported on 3/28/2025   medical cannabis 3/27/2025 Self   Sig: Take 1 each by mouth once daily at bedtime.   pravastatin (Pravachol) 10 mg tablet 3/27/2025 Evening    Sig: TAKE 1 TABLET BY MOUTH AT BEDTIME   triamcinolone (Kenalog) 0.1 % lotion Unknown    Sig: Apply topically early in the morning..   venlafaxine XR (Effexor-XR) 150 mg 24 hr capsule 3/28/2025 Morning Self   Sig: Take 1 capsule (150 mg) by mouth once daily in the morning. With 37.5 tablet  every morning   venlafaxine XR (Effexor-XR) 37.5 mg 24 hr capsule 3/27/2025 Morning    Sig: TAKE ONE ALONG WITH 150 MG DAILY   vitamin E mixed 400 unit capsule 3/27/2025 Morning Self   Sig: Take 1 capsule by mouth once daily in the morning.      Facility-Administered Medications: None       The list below reflectives the updated allergy list. Please review each documented allergy for additional clarification and justification.    Allergies   Allergen Reactions    Lovastatin GI Upset    Codeine Nausea Only and Nausea/vomiting    Beclomethasone Unknown    Omeprazole Dermatitis    Other Itching     Hair dye    Hydrocortisone Unknown     (+) on allergy  test    Penicillins Itching          03/28/25 at 7:41 PM - Caitlin Smith

## 2025-03-28 NOTE — NURSING NOTE
Patient reported to PAT complaining of palpitations. EKG was completed which revealed atrial fibrillation RVR with a rate of 150. Patient reports FORBES which she states is baseline. She denies SOB at rest or chest pain. /90. Rapid response initiated. Hospitalist Dr Gabriel franco to evaluate patient. Patient to be transported to [facility] ER via 911. Report was called to [facility] by this APRN. Dr Dillon notified that PAT for this patient was not completed and that they were sent to ER. Patient stable at time of transport. Report given to EMS and patient care was passed to [facility].

## 2025-03-28 NOTE — ED NOTES
Dr. Bianchi notified via secure chat of patient's heart rate, awaiting orders at this time.      Eleni Skiffey, RN  03/28/25 7905

## 2025-03-28 NOTE — GROUP NOTE
In addition to the group class activities, Roseanne Lira had the following lab work completed:  No orders of the defined types were placed in this encounter.      A new History and Physical was not completed.    This class lasted approximately 2 hours and had 6 participants. The nurse instructor covered the following topics:    MyChart Enrollment  Communication with Care Team  My Chart is the best form of communication to reach all of your caregivers  You can send messages to specific care givers, or a care team  Continued Education  You will be enrolled in a Joint Replacement care plan to receive additional education before and after surgery  You can review a short recording of the class content - [email or web address]  Access to Medical Records  You can access test results, office notes, appointments, etc.  You can connect to other healthcare systems who use [employer] ([facility], [facility], [facility], etc.)  Mira Dx  Program Information  Opportunity to Opt Out    Background/Understanding of Joint Replacement Surgery  Potential for same day discharge  Any questions or concerns to be directed to the surgeon's office  Not all patients are appropriate for same day discharge  All patients will be required to meet discharge criteria prior to leaving our care    How to Prepare for Surgery  Use of Recreational Products (Nicotine, Alcohol, THC, CBD, Drugs, Etc.)  The ultimate goal is to quit using thee products!  Stop several weeks before surgery  Such products slow down the healing process and increase risk of post-op infection and complications  Clearance for Surgery  Preadmission Testing - Appropriateness for anesthesia  Medical Clearance by Specialists  Dental Clearance  Cracked/Broken/Loose teeth left untreated may postpone surgery  The importance of post-op antibiotics for dental visits per surgeon protocol  Preadmission Testing  **Potential for postponed surgery if appropriate clearance is not  obtained  Medication Instruction  Follow instructions provided by the doctor who prescribes your medication (typically, but not limited to cardiologist)  Preadmission testing will provide additional instructions during your appointment on what to stop and what to take as you get closer to surgery  For clarification of these instructions, please call preadmission testing directly - [phone]  Tips for Preparing the home for discharge from the hospital  Care Partner  Requirement for surgery, the patient must have a plan to have help at home  Potential for postponed surgery if plan for home support cannot be established  Your Care Partner does not need medical training  How the care partner can help after surgery  CHG Body Wash/Mouth Wash  Follow the instructions given at preadmission testing  Body wash is to be used on the body and hair for 5 washes  Mouthwash is to be used the night before and morning of surgery  **This is a system-wide protocol developed by infectious disease professionals, we will not alter our recommendations for those with sensitive skin or those who have special hair needs.  Please follow the instructions as they are written as this will provide the best infection prevention measures for surgery.  Should you have an allergy to one of the products, please discuss with your preadmission team**    What to Expect in the Hospital/At Home  Morning of Surgery NPO Guidelines  Nothing to eat after midnight  Water can be consumed up to 2 hours prior to arrival  Surgical and Post-Surgical Care Team  Surgical Team  Anesthesia Team  Nursing  Physical Therapy  Care Coordinating  Pharmacy  Hospital Arrival Instructions  Arrive at the time provided to you  Consider traffic patterns (rush-hour) based on arrival time  Have arrangements made for a ride home  If discharging same day, care partner should remain at the hospital  Recovering after Surgery  Recovery Room - Visitors are not brought back  Transition to  hospital room - 2nd Floor, Visitors will be directed to your room  The presence of and strategies for controlling surgical pain and swelling  The importance of early mobility  Side effects after surgery  What to expect if staying overnight    Discharge Planning  The intended plan for discharge will be for patients to discharge home  All patients require a care partner (family, friend, neighbor, etc.) to stay with the patient for the first few nights after surgery  The inability to secure help at home may postpone surgery  Home Care Services set up per surgeon order  Physical Therapy  Occupational Therapy  **If desired, private duty care can be arranged by the patient ahead of time**  Outpatient Physical Therapy per surgeon order    Recovering at Home  Wound Care  Follow wound care instructions found in your discharge paperwork  Bandage is water-resistant and you may shower with the bandage  Do not scrub directly over the bandage  Do not submerge in water until cleared (bathtub, hot tub, pool, etc.)    Post-Op Risk Prevention  Infection Prevention  Promptly seek treatment for any infections post-operatively  Routine dental visits must be postponed for 3 months after surgery  Your surgeon may require antibiotics prior to future dental visits  Any concerns for infection not related directly to the knee or the hip should be managed by your primary care provider  Blood Clots  Be sure to complete the course of blood thinning medication as prescribed by your surgeon  Movement every 1-2 hours during the day is encouraged to prevent blood clots  Monitor for signs of blood clots  Wear compression stockings as prescribed by your surgeon  Constipation  Constipation is common following surgery  Drink plenty of fluids  Take stool softener/laxative as prescribed by your surgeon  Move around frequently  Eat foods high in fiber  Fall Prevention  Prepare home ahead of time to clear space to move with walker  Remove throw rugs and  electrical cords from walkways  Install railings near any stairways with more than 2 steps  Use night lights for increased visibility at night  Continue to use your assistive device until cleared by surgeon or physical therapy  Dislocation Prevention - Not all procedures will have dislocation precautions  Follow dislocation precautions provided by your surgeon  It is OK to resume sexual activity about 6 weeks following surgery  Be sure to follow any dislocation precautions assigned    Durable Medical Equipment  Cold Therapy  Breg Cold Therapy Machines  Ice/Gel Packs  Assistive Devices  Folding Walker with Wheels (in the front only)  No Rollators  Crutches if approved by Physical Therapy and Surgeon after surgery  Hip Kits  Raised Toilet Seats  Additional Compression Stockings    Joint Preservation  Healthy Activities when Cleared  Walking  Swimming  Bike Riding  Activities to Avoid  Refrain from repetitive motions which have a high impact on the joint  Gradual Progression  Progress activity slowly, listen to your body  Common Findings - NORMAL after surgery  Clicking/Grinding  Numbness near incision    Physical Therapy  Prehabilitation exercises  START TODAY ON BOTH LEGS  Surgery Specific Precautions  Follow surgery specific precautions found in your discharge paperwork    Follow-Up Visit  All patients will see their surgeon for a follow up visit after surgery  The visit may range from 2-6 weeks after surgery and is surgeon specific      Please don't hesitate to reach out if you have any additional questions or concerns.    Kaylin Donnelly MBA, BSN, RN-BC, ONC  BARNEY Shipman, RN  Yun Escamilla, AMIE  Orthopedic Program Navigators  [facility]   [phone]

## 2025-03-29 ENCOUNTER — APPOINTMENT (OUTPATIENT)
Dept: CARDIOLOGY | Facility: HOSPITAL | Age: 75
End: 2025-03-29
Payer: MEDICARE

## 2025-03-29 VITALS
BODY MASS INDEX: 32.78 KG/M2 | TEMPERATURE: 97.2 F | HEART RATE: 61 BPM | SYSTOLIC BLOOD PRESSURE: 162 MMHG | DIASTOLIC BLOOD PRESSURE: 79 MMHG | RESPIRATION RATE: 18 BRPM | OXYGEN SATURATION: 94 % | HEIGHT: 64 IN | WEIGHT: 192 LBS

## 2025-03-29 LAB
ANION GAP SERPL CALC-SCNC: 14 MMOL/L (ref 10–20)
AORTIC VALVE MEAN GRADIENT: 10 MMHG
AORTIC VALVE PEAK VELOCITY: 2.06 M/S
APTT PPP: 186 SECONDS (ref 26–36)
AV PEAK GRADIENT: 17 MMHG
AVA (PEAK VEL): 2.99 CM2
AVA (VTI): 3.22 CM2
BUN SERPL-MCNC: 15 MG/DL (ref 6–23)
CALCIUM SERPL-MCNC: 9.6 MG/DL (ref 8.6–10.3)
CHLORIDE SERPL-SCNC: 104 MMOL/L (ref 98–107)
CO2 SERPL-SCNC: 25 MMOL/L (ref 21–32)
CREAT SERPL-MCNC: 0.81 MG/DL (ref 0.5–1.05)
EGFRCR SERPLBLD CKD-EPI 2021: 76 ML/MIN/1.73M*2
EJECTION FRACTION APICAL 4 CHAMBER: 77.9
EJECTION FRACTION: 74 %
ERYTHROCYTE [DISTWIDTH] IN BLOOD BY AUTOMATED COUNT: 14.1 % (ref 11.5–14.5)
GLUCOSE SERPL-MCNC: 107 MG/DL (ref 74–99)
HCT VFR BLD AUTO: 42.5 % (ref 36–46)
HGB BLD-MCNC: 13.8 G/DL (ref 12–16)
LEFT ATRIUM VOLUME AREA LENGTH INDEX BSA: 28.3 ML/M2
LEFT VENTRICLE INTERNAL DIMENSION DIASTOLE: 5.03 CM (ref 3.5–6)
LEFT VENTRICULAR OUTFLOW TRACT DIAMETER: 2.03 CM
MAGNESIUM SERPL-MCNC: 2.2 MG/DL (ref 1.6–2.4)
MCH RBC QN AUTO: 27.1 PG (ref 26–34)
MCHC RBC AUTO-ENTMCNC: 32.5 G/DL (ref 32–36)
MCV RBC AUTO: 83 FL (ref 80–100)
MITRAL VALVE E/A RATIO: 0.71
NRBC BLD-RTO: 0 /100 WBCS (ref 0–0)
PLATELET # BLD AUTO: 407 X10*3/UL (ref 150–450)
POTASSIUM SERPL-SCNC: 3.7 MMOL/L (ref 3.5–5.3)
RBC # BLD AUTO: 5.1 X10*6/UL (ref 4–5.2)
RIGHT VENTRICLE FREE WALL PEAK S': 21 CM/S
RIGHT VENTRICLE PEAK SYSTOLIC PRESSURE: 31.2 MMHG
SODIUM SERPL-SCNC: 139 MMOL/L (ref 136–145)
TRICUSPID ANNULAR PLANE SYSTOLIC EXCURSION: 2.6 CM
UFH PPP CHRO-ACNC: 0.9 IU/ML
UFH PPP CHRO-ACNC: 1 IU/ML
WBC # BLD AUTO: 9.6 X10*3/UL (ref 4.4–11.3)

## 2025-03-29 PROCEDURE — 96366 THER/PROPH/DIAG IV INF ADDON: CPT

## 2025-03-29 PROCEDURE — 93306 TTE W/DOPPLER COMPLETE: CPT

## 2025-03-29 PROCEDURE — 2500000004 HC RX 250 GENERAL PHARMACY W/ HCPCS (ALT 636 FOR OP/ED): Performed by: NURSE PRACTITIONER

## 2025-03-29 PROCEDURE — 96376 TX/PRO/DX INJ SAME DRUG ADON: CPT | Mod: 59

## 2025-03-29 PROCEDURE — 96375 TX/PRO/DX INJ NEW DRUG ADDON: CPT | Mod: 59

## 2025-03-29 PROCEDURE — 85730 THROMBOPLASTIN TIME PARTIAL: CPT | Performed by: EMERGENCY MEDICINE

## 2025-03-29 PROCEDURE — G0378 HOSPITAL OBSERVATION PER HR: HCPCS

## 2025-03-29 PROCEDURE — 2500000001 HC RX 250 WO HCPCS SELF ADMINISTERED DRUGS (ALT 637 FOR MEDICARE OP): Performed by: NURSE PRACTITIONER

## 2025-03-29 PROCEDURE — 36415 COLL VENOUS BLD VENIPUNCTURE: CPT | Performed by: FAMILY MEDICINE

## 2025-03-29 PROCEDURE — 85520 HEPARIN ASSAY: CPT | Performed by: FAMILY MEDICINE

## 2025-03-29 PROCEDURE — 2500000004 HC RX 250 GENERAL PHARMACY W/ HCPCS (ALT 636 FOR OP/ED): Performed by: FAMILY MEDICINE

## 2025-03-29 PROCEDURE — 2500000004 HC RX 250 GENERAL PHARMACY W/ HCPCS (ALT 636 FOR OP/ED): Performed by: EMERGENCY MEDICINE

## 2025-03-29 PROCEDURE — 96365 THER/PROPH/DIAG IV INF INIT: CPT | Mod: 59

## 2025-03-29 PROCEDURE — 36415 COLL VENOUS BLD VENIPUNCTURE: CPT | Performed by: EMERGENCY MEDICINE

## 2025-03-29 PROCEDURE — 2500000001 HC RX 250 WO HCPCS SELF ADMINISTERED DRUGS (ALT 637 FOR MEDICARE OP): Performed by: FAMILY MEDICINE

## 2025-03-29 PROCEDURE — 83735 ASSAY OF MAGNESIUM: CPT | Performed by: NURSE PRACTITIONER

## 2025-03-29 PROCEDURE — 85520 HEPARIN ASSAY: CPT | Performed by: EMERGENCY MEDICINE

## 2025-03-29 PROCEDURE — 2500000002 HC RX 250 W HCPCS SELF ADMINISTERED DRUGS (ALT 637 FOR MEDICARE OP, ALT 636 FOR OP/ED): Performed by: FAMILY MEDICINE

## 2025-03-29 PROCEDURE — 80048 BASIC METABOLIC PNL TOTAL CA: CPT | Performed by: FAMILY MEDICINE

## 2025-03-29 PROCEDURE — 99222 1ST HOSP IP/OBS MODERATE 55: CPT

## 2025-03-29 PROCEDURE — 96368 THER/DIAG CONCURRENT INF: CPT

## 2025-03-29 PROCEDURE — 85027 COMPLETE CBC AUTOMATED: CPT | Performed by: FAMILY MEDICINE

## 2025-03-29 RX ORDER — PNV NO.95/FERROUS FUM/FOLIC AC 28MG-0.8MG
100 TABLET ORAL EVERY MORNING
Status: DISCONTINUED | OUTPATIENT
Start: 2025-03-29 | End: 2025-03-29 | Stop reason: HOSPADM

## 2025-03-29 RX ORDER — ACETAMINOPHEN 325 MG/1
650 TABLET ORAL EVERY 4 HOURS PRN
Status: DISCONTINUED | OUTPATIENT
Start: 2025-03-29 | End: 2025-03-29 | Stop reason: HOSPADM

## 2025-03-29 RX ORDER — ACETAMINOPHEN 500 MG
1000 TABLET ORAL EVERY 6 HOURS PRN
Start: 2025-03-29

## 2025-03-29 RX ORDER — AMLODIPINE BESYLATE 5 MG/1
2.5 TABLET ORAL 2 TIMES DAILY
Status: DISCONTINUED | OUTPATIENT
Start: 2025-03-29 | End: 2025-03-29 | Stop reason: HOSPADM

## 2025-03-29 RX ORDER — METOPROLOL SUCCINATE 25 MG/1
25 TABLET, EXTENDED RELEASE ORAL DAILY
Qty: 30 TABLET | Refills: 0 | Status: SHIPPED | OUTPATIENT
Start: 2025-03-30 | End: 2025-04-29

## 2025-03-29 RX ORDER — PROCHLORPERAZINE EDISYLATE 5 MG/ML
10 INJECTION INTRAMUSCULAR; INTRAVENOUS EVERY 6 HOURS PRN
Status: DISCONTINUED | OUTPATIENT
Start: 2025-03-29 | End: 2025-03-29 | Stop reason: HOSPADM

## 2025-03-29 RX ORDER — PRAVASTATIN SODIUM 20 MG/1
20 TABLET ORAL NIGHTLY
Status: DISCONTINUED | OUTPATIENT
Start: 2025-03-29 | End: 2025-03-29 | Stop reason: HOSPADM

## 2025-03-29 RX ORDER — FAMOTIDINE 20 MG/1
20 TABLET, FILM COATED ORAL DAILY
Status: DISCONTINUED | OUTPATIENT
Start: 2025-03-29 | End: 2025-03-29 | Stop reason: HOSPADM

## 2025-03-29 RX ORDER — DICLOFENAC SODIUM 10 MG/G
4 GEL TOPICAL 2 TIMES DAILY PRN
Status: DISCONTINUED | OUTPATIENT
Start: 2025-03-29 | End: 2025-03-29 | Stop reason: HOSPADM

## 2025-03-29 RX ORDER — FAMOTIDINE 20 MG/1
40 TABLET, FILM COATED ORAL NIGHTLY
Status: DISCONTINUED | OUTPATIENT
Start: 2025-03-29 | End: 2025-03-29 | Stop reason: SDUPTHER

## 2025-03-29 RX ORDER — VENLAFAXINE HYDROCHLORIDE 37.5 MG/1
37.5 CAPSULE, EXTENDED RELEASE ORAL
Status: DISCONTINUED | OUTPATIENT
Start: 2025-03-30 | End: 2025-03-29 | Stop reason: HOSPADM

## 2025-03-29 RX ORDER — ACETAMINOPHEN 160 MG/5ML
650 SOLUTION ORAL EVERY 4 HOURS PRN
Status: DISCONTINUED | OUTPATIENT
Start: 2025-03-29 | End: 2025-03-29 | Stop reason: HOSPADM

## 2025-03-29 RX ORDER — METOPROLOL SUCCINATE 25 MG/1
25 TABLET, EXTENDED RELEASE ORAL DAILY
Status: DISCONTINUED | OUTPATIENT
Start: 2025-03-29 | End: 2025-03-29 | Stop reason: HOSPADM

## 2025-03-29 RX ORDER — POLYETHYLENE GLYCOL 3350 17 G/17G
17 POWDER, FOR SOLUTION ORAL DAILY
Status: DISCONTINUED | OUTPATIENT
Start: 2025-03-29 | End: 2025-03-29 | Stop reason: HOSPADM

## 2025-03-29 RX ORDER — GUAIFENESIN 600 MG/1
600 TABLET, EXTENDED RELEASE ORAL EVERY 12 HOURS PRN
Status: DISCONTINUED | OUTPATIENT
Start: 2025-03-29 | End: 2025-03-29 | Stop reason: HOSPADM

## 2025-03-29 RX ORDER — VENLAFAXINE HYDROCHLORIDE 75 MG/1
150 CAPSULE, EXTENDED RELEASE ORAL EVERY MORNING
Status: DISCONTINUED | OUTPATIENT
Start: 2025-03-29 | End: 2025-03-29 | Stop reason: HOSPADM

## 2025-03-29 RX ORDER — DILTIAZEM HCL/D5W 125 MG/125
5 PLASTIC BAG, INJECTION (ML) INTRAVENOUS CONTINUOUS
Status: DISCONTINUED | OUTPATIENT
Start: 2025-03-29 | End: 2025-03-29

## 2025-03-29 RX ORDER — PROCHLORPERAZINE MALEATE 10 MG
10 TABLET ORAL EVERY 6 HOURS PRN
Status: DISCONTINUED | OUTPATIENT
Start: 2025-03-29 | End: 2025-03-29 | Stop reason: HOSPADM

## 2025-03-29 RX ORDER — CETIRIZINE HYDROCHLORIDE 10 MG/1
10 TABLET ORAL EVERY MORNING
Status: DISCONTINUED | OUTPATIENT
Start: 2025-03-29 | End: 2025-03-29 | Stop reason: HOSPADM

## 2025-03-29 RX ORDER — PROCHLORPERAZINE 25 MG/1
25 SUPPOSITORY RECTAL EVERY 12 HOURS PRN
Status: DISCONTINUED | OUTPATIENT
Start: 2025-03-29 | End: 2025-03-29 | Stop reason: HOSPADM

## 2025-03-29 RX ORDER — ACETAMINOPHEN 650 MG/1
650 SUPPOSITORY RECTAL EVERY 4 HOURS PRN
Status: DISCONTINUED | OUTPATIENT
Start: 2025-03-29 | End: 2025-03-29 | Stop reason: HOSPADM

## 2025-03-29 RX ORDER — FAMOTIDINE 40 MG/1
40 TABLET, FILM COATED ORAL NIGHTLY
Start: 2025-03-29 | End: 2025-04-04

## 2025-03-29 RX ORDER — PSYLLIUM HUSK 0.4 G
1 CAPSULE ORAL EVERY MORNING
Status: DISCONTINUED | OUTPATIENT
Start: 2025-03-29 | End: 2025-03-29 | Stop reason: HOSPADM

## 2025-03-29 RX ORDER — CHOLECALCIFEROL (VITAMIN D3) 25 MCG
5000 TABLET ORAL DAILY
Status: DISCONTINUED | OUTPATIENT
Start: 2025-03-29 | End: 2025-03-29 | Stop reason: HOSPADM

## 2025-03-29 RX ADMIN — APIXABAN 5 MG: 5 TABLET, FILM COATED ORAL at 10:33

## 2025-03-29 RX ADMIN — FAMOTIDINE 40 MG: 20 TABLET, FILM COATED ORAL at 00:53

## 2025-03-29 RX ADMIN — Medication 1 TABLET: at 08:07

## 2025-03-29 RX ADMIN — Medication 15 MG/HR: at 00:11

## 2025-03-29 RX ADMIN — Medication 15 MG/HR: at 08:07

## 2025-03-29 RX ADMIN — VENLAFAXINE HYDROCHLORIDE 150 MG: 75 CAPSULE, EXTENDED RELEASE ORAL at 10:33

## 2025-03-29 RX ADMIN — PROCHLORPERAZINE EDISYLATE 10 MG: 5 INJECTION INTRAMUSCULAR; INTRAVENOUS at 08:17

## 2025-03-29 RX ADMIN — FAMOTIDINE 20 MG: 20 TABLET, FILM COATED ORAL at 10:33

## 2025-03-29 RX ADMIN — Medication 5 MG/HR: at 08:25

## 2025-03-29 RX ADMIN — METOPROLOL SUCCINATE 25 MG: 25 TABLET, EXTENDED RELEASE ORAL at 10:33

## 2025-03-29 RX ADMIN — VITAM B12 100 MCG: 100 TAB at 08:07

## 2025-03-29 RX ADMIN — CETIRIZINE HYDROCHLORIDE 10 MG: 10 TABLET, FILM COATED ORAL at 08:07

## 2025-03-29 RX ADMIN — AMLODIPINE BESYLATE 2.5 MG: 5 TABLET ORAL at 00:53

## 2025-03-29 RX ADMIN — AMLODIPINE BESYLATE 2.5 MG: 5 TABLET ORAL at 08:07

## 2025-03-29 RX ADMIN — AMLODIPINE BESYLATE 2.5 MG: 5 TABLET ORAL at 15:43

## 2025-03-29 RX ADMIN — HEPARIN SODIUM 1400 UNITS/HR: 10000 INJECTION, SOLUTION INTRAVENOUS at 04:44

## 2025-03-29 ASSESSMENT — COGNITIVE AND FUNCTIONAL STATUS - GENERAL
DAILY ACTIVITIY SCORE: 24
MOBILITY SCORE: 24

## 2025-03-29 ASSESSMENT — PAIN SCALES - GENERAL
PAINLEVEL_OUTOF10: 0 - NO PAIN
PAINLEVEL_OUTOF10: 0 - NO PAIN

## 2025-03-29 ASSESSMENT — PAIN - FUNCTIONAL ASSESSMENT: PAIN_FUNCTIONAL_ASSESSMENT: 0-10

## 2025-03-29 NOTE — DISCHARGE SUMMARY
Discharge Diagnosis  Atrial fibrillation with RVR (Multi)    Issues Requiring Follow-Up  Atrial fibrillation    Discharge Meds     Medication List      START taking these medications     apixaban 5 mg tablet; Commonly known as: Eliquis; Take 1 tablet (5 mg)   by mouth 2 times a day. For a fib   metoprolol succinate XL 25 mg 24 hr tablet; Commonly known as:   Toprol-XL; Take 1 tablet (25 mg) by mouth once daily. Do not crush or   chew.; Start taking on: March 30, 2025     CONTINUE taking these medications     acetaminophen 500 mg tablet; Commonly known as: Tylenol; Take 2 tablets   (1,000 mg) by mouth every 6 hours if needed for mild pain (1 - 3).   amLODIPine 2.5 mg tablet; Commonly known as: Norvasc; TAKE 1 TABLET (2.5   MG) BY MOUTH 2 TIMES A DAY.   calcium carbonate-vitamin D3 600 mg-20 mcg (800 unit) tablet   cholecalciferol 5,000 Units tablet; Commonly known as: Vitamin D-3   cyanocobalamin 100 mcg tablet; Commonly known as: Vitamin B-12   famotidine 40 mg tablet; Commonly known as: Pepcid; Take 1 tablet (40   mg) by mouth once daily at bedtime. 20mg every morning and 40mg at night   medical cannabis   pravastatin 10 mg tablet; Commonly known as: Pravachol; TAKE 1 TABLET BY   MOUTH AT BEDTIME   * venlafaxine  mg 24 hr capsule; Commonly known as: Effexor-XR   * venlafaxine XR 37.5 mg 24 hr capsule; Commonly known as: Effexor-XR   vitamin E mixed 400 unit capsule   Voltaren Arthritis Pain 1 % gel; Generic drug: diclofenac sodium   ZyrTEC 10 mg tablet; Generic drug: cetirizine  * This list has 2 medication(s) that are the same as other medications   prescribed for you. Read the directions carefully, and ask your doctor or   other care provider to review them with you.     STOP taking these medications     furosemide 20 mg tablet; Commonly known as: Lasix   ibuprofen 200 mg tablet   LORazepam 0.5 mg tablet; Commonly known as: Ativan   MAGNESIUM GLUCONATE ORAL   triamcinolone 0.1 % lotion; Commonly known as:  Kenalog       Test Results Pending At Discharge  Pending Labs       No current pending labs.            Hospital Course   Pt admitted for a fib rvr  And treated with cardizem drip and converted  Seen cardio started on eliquis and metoprolol  Echo ef normal   Aortic valve sclerosis  Bobo discharge to home   Follow up with cardio and PCP    Pertinent Physical Exam At Time of Discharge  Physical Exam    Outpatient Follow-Up  Future Appointments   Date Time Provider Department Center   4/11/2025  2:30 PM Anastacia Pack MD [id number] [facility]   5/28/2025  9:00 AM Page Lopez MD [id number] [facility]   5/29/2025 11:00 AM Patrizia Villa PA-C [id number] [facility]   6/17/2025  1:00 PM Brooke Xie MD [id number] [facility]   7/14/2025  1:00 PM Nicole Wakefield, APRN-CNP [id number] Academic         Brayden Rutledge MD

## 2025-03-29 NOTE — ED PROVIDER NOTES
HPI   Chief Complaint   Patient presents with    Rapid Heart Rate       HPI: []  74-year-old female history of hypertension, remote C-spine surgery, with A-fib RVR.  Patient went for preop testing for knee surgery and was found to be in A-fib RVR.  She states morning 11/30/2012 palpitations.  Also the last couple days ago she had some palpitations.  As if she had drank too much coffee.  She denies any chest pain pressure heaviness no shortness of breath no fever no chills nausea vomit diarrhea no syncope or near syncope no dizziness no diaphoresis.  No hematemesis melena hematochezia no hemoptysis no recent travel hospitalization or antibiotic use.    Past history: Hypertension, DJD  Social: Patient denies current tobacco alcohol drug abuse.  REVIEW OF SYSTEMS:    GENERAL.: No weight loss, fatigue, anorexia, insomnia, fever.    EYES: No vision loss, double vision, drainage, eye pain.    ENT: No pharyngitis, dry mouth.    CARDIOPULMONARY: No chest pain, positive for palpitations, syncope, near syncope. No shortness of breath, cough, hemoptysis.    GI: No abdominal pain, change in bowel habits, melena, hematemesis, hematochezia, nausea, vomiting, diarrhea.    : No discharge, dysuria, frequency, urgency, hematuria.    MS: No limb pain, joint pain, joint swelling.    SKIN: No rashes.    PSYCH: No depression, anxiety, suicidality, homicidality.    Review of systems is otherwise negative unless stated above or in history of present illness.  Social history, family history, allergies reviewed.  PHYSICAL EXAM:    GENERAL: Vitals noted, no distress. Alert and oriented  x 3. Non-toxic.      EENT: TMs clear. Posterior oropharynx unremarkable. No meningismus. No LAD.     NECK: Supple. Nontender. No midline tenderness.  Right anterior scar from prior surgery    CARDIAC: Tachycardic with an irregularly irregular rate and rhythm,. No murmurs rubs or gallops. No JVD    PULMONARY: Lungs clear bilaterally with good aeration. No  wheezes rales or rhonchi. No respiratory distress.     ABDOMEN: Soft, nonsurgical. Nontender. No peritoneal signs. Normoactive bowel sounds. No pulsatile masses.     EXTREMITIES: No peripheral edema. Negative Homans bilaterally, no cords.  2+ bounding pulses well-perfused.    SKIN: No rash. Intact.     NEURO: No focal neurologic deficits, NIH score of 0. Cranial nerves normal as tested from II through XII.     MEDICAL DECISION MAKING:  EKG on my interpretation shows atrial fibrillation normal axis rate about 140 with the nonspecific ST and T wave changes.  CBC with differential chemistry LFTs are normal BNP slightly elevated troponin x 2 is negative TSH normal chest x-ray negative.    Treatments: IV established on a cardiac monitor she given IV Cardizem bolus 20 mg followed by Cardizem drip uptitrated and also given digoxin load.  She also heparinized high-dose VTE protocol due to high Frank vascular score of 4.    ED course: Patient did respond to Cardizem and digoxin heart rate came down to low 100s but remains in A-fib.    Impression: New onset A-fib with RVR  Plan set MDM: 74-year-old white female history of hypertension comes in with A-fib RVR new onset with no signs of ejective heart failure low suspicion for STEMI NSTEMI dissection pulm embolism congestive heart failure pulmonary edema, patient heart rate is responding to Cardizem and digoxin, patient heparinized due to high Frank vascular score, and patient will be hospitalized for further care.              Patient History   Past Medical History:   Diagnosis Date    Age-related nuclear cataract, left eye 03/25/2017    Age-related nuclear cataract of left eye    Age-related nuclear cataract, right eye 03/25/2017    Age-related nuclear cataract of right eye    Allergy status to unspecified drugs, medicaments and biological substances     History of seasonal allergies    Arthritis     Asthma     Breast cancer in situ     Cortical age-related cataract, left eye  03/25/2017    Cortical age-related cataract of left eye    Cortical age-related cataract, right eye 03/25/2017    Cortical age-related cataract of right eye    Depression     Diarrhea, unspecified 09/25/2018    Acute diarrhea    Dizziness and giddiness 06/10/2020    Postural dizziness with presyncope    Elevated blood-pressure reading, without diagnosis of hypertension 10/16/2019    Prehypertension    Encounter for general adult medical examination without abnormal findings 09/14/2020    Preventative health care    Encounter for other preprocedural examination     Preoperative exam for gynecologic surgery    Encounter for screening mammogram for malignant neoplasm of breast 06/03/2013    Visit for screening mammogram    Epigastric pain 01/24/2020    Abdominal pain, acute, epigastric    Fibromyalgia, primary     GERD (gastroesophageal reflux disease)     Headache, unspecified 10/19/2020    Sinus headache    Hyperlipidemia     Hypertension     Intraductal carcinoma in situ of unspecified breast 04/01/2015    DCIS (ductal carcinoma in situ) of breast    Joint pain     Leukoplakia of vulva     Lichen sclerosus of female genitalia    Long term (current) use of systemic steroids 08/27/2019    Current chronic use of systemic steroids    Malignant neoplasm of vertebral column (Multi) 09/09/2024    Meningioma (Multi)     Nephrolithiasis     Other chest pain 10/16/2019    Atypical chest pain    Other conditions influencing health status 10/25/2019    Chronic use of steroids    Other disturbances of skin sensation 05/19/2015    Burning sensation    Other malaise 08/23/2018    Malaise and fatigue    Other pericardial effusion (noninflammatory) ([facility]) 04/07/2015    Pericardial effusion    Personal history of malignant neoplasm of bone 07/11/2019    History of malignant neoplasm of bone    Personal history of other diseases of the respiratory system 04/01/2015    History of asthma    Personal history of other diseases of the  respiratory system 04/05/2017    History of acute bronchitis    Personal history of other diseases of the respiratory system 01/03/2014    History of upper respiratory infection    Personal history of other specified conditions 03/30/2015    History of abdominal pain    Personal history of urinary (tract) infections 06/23/2015    History of urinary tract infection    Personal history of urinary (tract) infections 04/05/2017    History of acute cystitis    Polymyalgia rheumatica (Multi)     Unspecified visual disturbance 07/07/2020    Visual disturbances    Urinary tract infection, site not specified 05/13/2021    Acute UTI    Uterine cancer (Multi)      Past Surgical History:   Procedure Laterality Date    BREAST LUMPECTOMY Right     with radiation in 2011    BREAST SURGERY  06/03/2013    Breast Surgery    CATARACT EXTRACTION      COLONOSCOPY  06/03/2013    Complete Colonoscopy    CT ANGIO NECK  12/09/2022    CT NECK ANGIO W AND WO IV CONTRAST 12/9/2022 [facility]    CT HEAD ANGIO W AND WO IV CONTRAST  12/09/2022    CT HEAD ANGIO W AND WO IV CONTRAST 12/9/2022 [facility]    HYSTERECTOMY      MR HEAD ANGIO WO IV CONTRAST  07/27/2020    MR HEAD ANGIO WO IV CONTRAST 7/27/2020 [facility]    MR HEAD ANGIO WO IV CONTRAST  05/18/2017    MR HEAD ANGIO WO IV CONTRAST 5/18/2017 [facility]    MR NECK ANGIO WO IV CONTRAST  07/27/2020    MR NECK ANGIO WO IV CONTRAST 7/27/2020 [facility]    MR NECK ANGIO WO IV CONTRAST  05/18/2017    MR NECK ANGIO WO IV CONTRAST 5/18/2017 [facility]    OTHER SURGICAL HISTORY  06/03/2013    Craniotomy Supratentorial Excision Of Meningioma    OTHER SURGICAL HISTORY  10/19/2020    Cataract surgery    OTHER SURGICAL HISTORY  03/13/2017    Arthrodesis Cervical c2-5 fusion    OTHER SURGICAL HISTORY  03/13/2017    Craniotomy Infratentorial Excision Of Meningioma    SPINE SURGERY  06/03/2013    Spine Repair fused for chordoma 1968    TUBAL  LIGATION  05/19/2015    Tubal Ligation     Family History   Problem Relation Name Age of Onset    Hypertension Mother      Dementia Mother      Hypertension Maternal Grandmother      Kidney disease Maternal Grandmother      Kidney disease Maternal Grandfather      Heart attack Maternal Grandfather      Alzheimer's disease Mother's Sister      Breast cancer Mother's Sister  40     Social History     Tobacco Use    Smoking status: Never     Passive exposure: Past    Smokeless tobacco: Never   Vaping Use    Vaping status: Never Used   Substance Use Topics    Alcohol use: Not Currently     Alcohol/week: 0.0 - 1.0 standard drinks of alcohol     Comment: one beer a month    Drug use: Yes     Types: Marijuana       Physical Exam   ED Triage Vitals   Temperature Heart Rate Respirations BP   03/28/25 1400 03/28/25 1304 03/28/25 1304 03/28/25 1304   37.2 °C (99 °F) (!) 144 18 122/81      Pulse Ox Temp Source Heart Rate Source Patient Position   03/28/25 1304 03/28/25 1946 03/28/25 1304 03/28/25 1946   96 % Tympanic Monitor Lying      BP Location FiO2 (%)     03/28/25 1946 --     Left arm        Physical Exam      ED Course & MDM   Diagnoses as of 03/28/25 2041   Atrial fibrillation with RVR (Multi)                 [id number] Score: 4     [place] Coma Scale Score: 15 (03/28/25 1957 : Yesenia Medina RN)                           Medical Decision Making      Procedure  Critical Care    Performed by: Evy Bianchi MD  Authorized by: Evy Bianchi MD    Critical care provider statement:     Critical care time (minutes):  45    Critical care time was exclusive of:  Separately billable procedures and treating other patients    Critical care was necessary to treat or prevent imminent or life-threatening deterioration of the following conditions:  Cardiac failure (Atrial fibrillation with RVR requiring intravenous Cardizem)    Critical care was time spent personally by me on the following activities:  Blood draw for  specimens, development of treatment plan with patient or surrogate, discussions with primary provider, evaluation of patient's response to treatment, examination of patient, ordering and performing treatments and interventions, ordering and review of laboratory studies, ordering and review of radiographic studies, pulse oximetry, re-evaluation of patient's condition, review of old charts and obtaining history from patient or surrogate    Care discussed with: admitting provider         Evy Bianchi MD  03/28/25 3212

## 2025-03-29 NOTE — NURSING NOTE
Discharge instructions provided using teach back method. Pt's health related  risk factors discussed with pt. pt educated to look for any worsening sign and symptoms. Pt educated to seek medical attention if experience any medical emergency. Pt aware to follow up with outpatient clinics as scheduled. Home going meds reviewed with pt. Pt verbalized understanding of disposition and discharge instructions. All questions answered to patient's satisfaction and within nursing scope of practice. Vitals stable, IVs removed. Pt given eliquis coupon card for free month supply.

## 2025-03-29 NOTE — CONSULTS
Inpatient consult to Cardiology  Consult performed by: Fátima Nelson, GUERLINE-CNP  Consult ordered by: Brayden Rutledge MD  Reason for consult: Afib w/RVR        History Of Present Illness:    Roseanne Lira is a 74 y.o. 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 She was taken via EMS to [facility] ED after presenting at [facility] for planned left TKA with palpitations, EKG revealed , Afib w/ RVR.  She reports that while waiting at her [facility] visit, she developed c/o palpitations which prompted staff to completed EKG which revealed Afib w/ RVR. No cp. No sob. At baseline, c/o FORBES with 1 flight of stairs. No LH/dizziness/syncope. She did report c/o nausea yesterday and today. She did vomit this AM prior to me seeing her. She reports she had norovirus about 2 weeks ago and about 6 weeks ago. Also had hx of COVID in January 2025. She reports one episode of brief palpitations about 2 days ago lasting minutes that resolved on its own.     ED course:   T 37.2//RR 18//81/Pox 96% on RA. EKG revealed Afib w/ RVR, ,  with ST depression in inferior and anterolateral leads. Chest x-ray with no acute cardiopulmonary process. BNP mildly elevated at 193. TSH wnl. PT/INR wnl. CBC w/diff unremarkable. HsTrop negative x2. BMP wnl, K 3.7. Given  "Diltiazem 20 mg IVP and started on gtt at 5 mg/hr. Given Heparin bolus 7000 units IVP and started on gtt at 1600 units/hr. Given Digoxin 500 mcg IVP.       Tele: Afib, converted to SR ~2215 last evening. Now HR 50-70s SR.     Outpatient cardiac medications: Amlodipine 2.5 mg daily, Pravastatin 10 mg daily, Furosemide 20 mg daily prn edema (does not take)    Social history: Denies smoking, alcohol abuse. +THC nightly gummy for \"insomnia\" per patient    Pertinent cardiac family history: No sudden cardiac death or premature CAD.       Past Cardiology Tests (Last 3 Years):  EKG 3/28/25:                Results for orders placed in visit on 03/28/25    ECG 12 Lead    Narrative  Atrial fibrillation with rapid ventricular response  Marked ST abnormality, possible inferior subendocardial injury  Abnormal ECG  When compared with ECG of 10-APR-2023 12:59,  Atrial fibrillation has replaced Sinus rhythm  Vent. rate has increased BY  87 BPM  ST now depressed in Inferior leads  ST now depressed in Anterolateral leads  Confirmed by Aj Hyde ([id number]) on 3/28/2025 12:45:23 PM    At PAT 3/28/25 1220:          Echo:  No results found for this or any previous visit.    Ejection Fractions:  No results found for: \"EF\"  Cath:  No results found for this or any previous visit.    Stress Test:  Exercise Stress Echo 5/7/21:  Baseline Echo: Global LV systolic function is normal. The resting baseline ejection fraction was estimated at 55 to 60%. There are no regional wall motion abnormalities at baseline. Spectral Doppler shows an impaired relaxation pattern of LV diastolic filling.     Peak Dose Echo: The left ventricular internal cavity size at peak dose is decreased. At peak stress, the global LV systolic function is increased.     Stress Echo: There are no stress induced regional wall motion abnormalities. The ejection fraction is approximately 65 to 70% at peak stress.     Mitral Valve:  Resting  Lateral e': 0.08 m/s           Tricuspid " Valve:  Resting  TR Vmax:      2.38 m/s  TR Peak Grad: 18.5 mmHg  RVSP:         23.5 mmHg           Summary:   1. No ECG or echocardiographic evidence of ischemia.   2. Normal global left ventricular systolic function.   3. The resting ejection fraction was estimated at 55 to 60% with a peak exercise ejection fraction estimated at 65 to 70%.   4. Hypertensive BP response to exercise correlation with shortness of breath, fatigue and bilateral leg fatigue.   5. The adequate level of stress was achieved.     [id number] Deborah Bar MD  Electronically signed on 5/7/2021 at 6:07:40 PM      Cardiac Imaging:      Bilateral Carotid Artery Vascular Ultrasound  1/27/25:    CONCLUSIONS:  Right Carotid: Findings are consistent with less than 50% stenosis of the right proximal internal carotid artery. Right external carotid artery appears patent with no evidence of stenosis. The right vertebral artery demonstrates no flow. No evidence of hemodynamically significant stenosis in the right subclavian artery.  Left Carotid: Findings are consistent with less than 50% stenosis of the left proximal internal carotid artery. Left external carotid artery appears patent with no evidence of stenosis. The left vertebral artery is patent with antegrade flow. No evidence of hemodynamically significant stenosis in the left subclavian artery.  Additional Findings:  Technically difficult exam due to body habitus, high carotid bifurcations,  tortuous vessels, and patient's limited mobility due to cervical neck fusion.        Imaging & Doppler Findings:  Right Plaque Morph: No plaque identified in the right carotid artery.  Left Plaque Morph: No plaque identified in the left carotid artery.      Right                       Left    PSV     EDV                PSV      EDV  57 cm/s           CCA P    56 cm/s  41 cm/s           CCA D    47 cm/s  22 cm/s 6 cm/s    ICA P    31 cm/s  8 cm/s  63 cm/s 18 cm/s   ICA M    65 cm/s  19 cm/s  36 cm/s 10 cm/s    ICA D    84 cm/s  23 cm/s  61 cm/s 7 cm/s     ECA     43 cm/s  0 cm/s          Vertebral  57 cm/s  15 cm/s  81 cm/s         Subclavian 147 cm/s                      Right Left  ICA/CCA Ratio  0.5  0.7           [id number] Laura Johnson MD  Electronically signed by [id number] Laura Johnson MD on 1/28/2025 at 6:55:52 AM      Past Medical History:  She has a past medical history of Age-related nuclear cataract, left eye (03/25/2017), Age-related nuclear cataract, right eye (03/25/2017), Allergy status to unspecified drugs, medicaments and biological substances, Arthritis, Asthma, Breast cancer in situ, Cortical age-related cataract, left eye (03/25/2017), Cortical age-related cataract, right eye (03/25/2017), Depression, Diarrhea, unspecified (09/25/2018), Dizziness and giddiness (06/10/2020), Elevated blood-pressure reading, without diagnosis of hypertension (10/16/2019), Encounter for general adult medical examination without abnormal findings (09/14/2020), Encounter for other preprocedural examination, Encounter for screening mammogram for malignant neoplasm of breast (06/03/2013), Epigastric pain (01/24/2020), Fibromyalgia, primary, GERD (gastroesophageal reflux disease), Headache, unspecified (10/19/2020), Hyperlipidemia, Hypertension, Intraductal carcinoma in situ of unspecified breast (04/01/2015), Joint pain, Leukoplakia of vulva, Long term (current) use of systemic steroids (08/27/2019), Malignant neoplasm of vertebral column (Multi) (09/09/2024), Meningioma (Multi), Nephrolithiasis, Other chest pain (10/16/2019), Other conditions influencing health status (10/25/2019), Other disturbances of skin sensation (05/19/2015), Other malaise (08/23/2018), Other pericardial effusion (noninflammatory) ([facility]) (04/07/2015), Personal history of malignant neoplasm of bone (07/11/2019), Personal history of other diseases of the respiratory system (04/01/2015), Personal history of other diseases of the respiratory system (04/05/2017),  Personal history of other diseases of the respiratory system (01/03/2014), Personal history of other specified conditions (03/30/2015), Personal history of urinary (tract) infections (06/23/2015), Personal history of urinary (tract) infections (04/05/2017), Polymyalgia rheumatica (Multi), Unspecified visual disturbance (07/07/2020), Urinary tract infection, site not specified (05/13/2021), and Uterine cancer (Multi).    She has no past medical history of Refusal of blood product.    Past Surgical History:  She has a past surgical history that includes Spine surgery (06/03/2013); Other surgical history (06/03/2013); Breast surgery (06/03/2013); Colonoscopy (06/03/2013); Other surgical history (10/19/2020); Tubal ligation (05/19/2015); Other surgical history (03/13/2017); Other surgical history (03/13/2017); MR angio head wo IV contrast (07/27/2020); MR angio neck wo IV contrast (07/27/2020); MR angio head wo IV contrast (05/18/2017); MR angio neck wo IV contrast (05/18/2017); CT angio neck (12/09/2022); CT angio head w and wo IV contrast (12/09/2022); Breast lumpectomy (Right); Cataract extraction; and Hysterectomy.      Social History:  She reports that she has never smoked. She has been exposed to tobacco smoke. She has never used smokeless tobacco. She reports that she does not currently use alcohol. She reports current drug use. Drug: Marijuana.    Family History:  Family History   Problem Relation Name Age of Onset    Hypertension Mother      Dementia Mother      Hypertension Maternal Grandmother      Kidney disease Maternal Grandmother      Kidney disease Maternal Grandfather      Heart attack Maternal Grandfather      Alzheimer's disease Mother's Sister      Breast cancer Mother's Sister  40        Allergies:  Lovastatin, Codeine, Beclomethasone, Omeprazole, Other, Hydrocortisone, and Penicillins    ROS:  10 point review of systems including (Constitutional, Eyes, ENMT, Respiratory, Cardiac, Gastrointestinal,  "Neurological, Psychiatric, and Hematologic) was performed and is otherwise negative.    Objective Data:  Last Recorded Vitals:  Vitals:    25 2200 25 0000 25 0400 25 0745   BP:  153/80 156/73 153/79   BP Location:  Left arm Left arm    Patient Position:  Lying Lying    Pulse:       Resp:  19  18   Temp:  36.7 °C (98.1 °F) 36.7 °C (98.1 °F) 36.8 °C (98.2 °F)   TempSrc:  Temporal Temporal Temporal   SpO2:  96% 96% 96%   Weight:       Height: 1.626 m (5' 4.02\")        Medical Gas Therapy: None (Room air)  Weight  Av.1 kg (192 lb)  Min: 87.1 kg (192 lb)  Max: 87.1 kg (192 lb)      LABS:  CMP:  Results from last 7 days   Lab Units 25  0501 25  1458   SODIUM mmol/L 139 140   POTASSIUM mmol/L 3.7 3.7   CHLORIDE mmol/L 104 107   CO2 mmol/L 25 23   ANION GAP mmol/L 14 14   BUN mg/dL 15 18   CREATININE mg/dL 0.81 0.74   EGFR mL/min/1.73m*2 76 84   MAGNESIUM mg/dL  --  2.17     CBC:  Results from last 7 days   Lab Units 25  0501 25  1344   WBC AUTO x10*3/uL 9.6 9.6   HEMOGLOBIN g/dL 13.8 13.9   HEMATOCRIT % 42.5 41.8   PLATELETS AUTO x10*3/uL 407 404   MCV fL 83 83     COAG:   Results from last 7 days   Lab Units 25  1344   INR  1.1     ABO: No results found for: \"ABO\"  HEME/ENDO:  Results from last 7 days   Lab Units 25  1344   TSH mIU/L 1.03      CARDIAC:   Results from last 7 days   Lab Units 25  1458 25  1344   TROPHS ng/L 6 4   BNP pg/mL  --  193*             Last I/O:    Intake/Output Summary (Last 24 hours) at 3/29/2025 0747  Last data filed at 3/29/2025 0537  Gross per 24 hour   Intake 721.26 ml   Output --   Net 721.26 ml     Net IO Since Admission: 721.26 mL [25 0747]      Imaging Results:  XR chest 1 view    Result Date: 3/28/2025  Interpreted By:  Arnulfo Daly, STUDY: XR CHEST 1 VIEW  3/28/2025 1:33 pm   INDICATION: Signs/Symptoms:a fib   COMPARISON: None.   ACCESSION NUMBER(S): [id number]   ORDERING CLINICIAN: LORNA CEVALLOS" TECHNIQUE: A single AP portable radiograph of the chest was obtained.   FINDINGS: Multiple cardiac monitoring leads are seen over the chest.   No focal infiltrate, pleural effusion or pneumothorax is identified. The cardiac silhouette is within normal limits for size.       No focal infiltrate or pneumothorax is identified.   MACRO: None.   Signed by: Arnulfo Daly 3/28/2025 2:04 PM Dictation workstation:   [id number]    ECG 12 Lead    Result Date: 3/28/2025  Atrial fibrillation with rapid ventricular response Marked ST abnormality, possible inferior subendocardial injury Abnormal ECG When compared with ECG of 10-APR-2023 12:59, Atrial fibrillation has replaced Sinus rhythm Vent. rate has increased BY  87 BPM ST now depressed in Inferior leads ST now depressed in Anterolateral leads Confirmed by Aj Hyde ([id number]) on 3/28/2025 12:45:23 PM      Inpatient Medications:  Scheduled medications   Medication Dose Route Frequency    amLODIPine  2.5 mg oral BID    calcium carbonate-vitamin D3  1 tablet oral q AM    cetirizine  10 mg oral q AM    cholecalciferol  5,000 Units oral Daily    cyanocobalamin  100 mcg oral q AM    famotidine  40 mg oral Nightly    polyethylene glycol  17 g oral Daily     PRN medications   Medication    acetaminophen    Or    acetaminophen    Or    acetaminophen    acetaminophen    Or    acetaminophen    Or    acetaminophen    diclofenac sodium    guaiFENesin    heparin    prochlorperazine    Or    prochlorperazine    Or    prochlorperazine     Continuous Medications   Medication Dose Last Rate    dilTIAZem  5-15 mg/hr 15 mg/hr (03/29/25 0537)    heparin  0-4,500 Units/hr 1,200 Units/hr (03/29/25 0537)       Outpatient Medications:  Current Outpatient Medications   Medication Instructions    acetaminophen (TYLENOL) 500 mg, Every 6 hours PRN    amLODIPine (NORVASC) 2.5 mg, oral, 2 times daily    calcium carbonate-vitamin D3 600 mg-20 mcg (800 unit) tablet 1 tablet, Every morning    cetirizine  (ZyrTEC) 10 mg tablet 1 tablet, Every morning    cholecalciferol (VITAMIN D-3) 5,000 Units, Daily    cyanocobalamin (Vitamin B-12) 100 mcg tablet 1 tablet, Every morning    diclofenac sodium (VOLTAREN ARTHRITIS PAIN) 4 g, 2 times daily PRN    famotidine (PEPCID) 40 mg, oral, Nightly    furosemide (Lasix) 20 mg tablet TAKE 1 TABLET BY MOUTH EVERY DAY AS NEEDED FOR EDEMA    ibuprofen 400 mg, Daily PRN    LORazepam (ATIVAN) 0.5 mg, Daily PRN    MAGNESIUM GLUCONATE ORAL 500 mg, Every morning    medical cannabis 1 each, Nightly    pravastatin (Pravachol) 10 mg tablet TAKE 1 TABLET BY MOUTH AT BEDTIME    triamcinolone (Kenalog) 0.1 % lotion Daily    venlafaxine XR (Effexor-XR) 37.5 mg 24 hr capsule TAKE ONE ALONG WITH 150 MG DAILY    venlafaxine XR (EFFEXOR-XR) 150 mg, Every morning    vitamin E mixed 400 unit capsule 1 capsule, Every morning       Physical Exam:  General:  Patient is awake, alert, and oriented.  Patient is in no acute distress.  HEENT:  Pupils equal and reactive.  Normocephalic.  Moist mucosa.    Neck:  No thyromegaly.  Normal Jugular Venous Pressure.  Cardiovascular:  Regular rate and rhythm.  Normal S1 and S2. +Murmur  Pulmonary:  Clear to auscultation bilaterally.  Abdomen:  Soft. Non-tender.   Non-distended.  Positive bowel sounds.  Lower Extremities:  2+ pedal pulses. Trace BLE edema   Neurologic:  Cranial nerves intact.  No focal deficit.   Skin: Skin warm and dry, normal skin turgor.   Psychiatric: Normal affect.     Assessment/Plan   Roseanne Lira is a 74 y.o. 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 She was taken via EMS to [facility] ED after presenting at [facility] for planned left TKA with palpitations, EKG revealed , Afib w/ RVR. Cardiology consulted for Afib w/RVR.    Outpatient cardiac medications: Amlodipine 2.5 mg daily, Pravastatin 10 mg daily, Furosemide 20 mg daily prn edema (does not take)    #Afib w/ RVR  -EKG on admit revealed Afib w/ RVR, ,  with ST depression in inferior and anterolateral leads. HsTrop negative x2. No cp.   -[id number] Score 6  -S/P Diltiazem 20 mg IVP, gtt currently at 15 mg/hr; 500 mcg Digoxin in ED  -Anticoagulated on Heparin gtt  -Converted to SR last night, now SB/SR HR 50-60s  #Murmur. Also with chronic FORBES (1 flight stairs). TTE pending. Exercise Stress Echo revealed normal LVEF 55-60%, diastolic dysfunction 5/2021.   #HTN  -controlled on outpatient regimen      Plan/Recs:  -We will obtain a transthoracic echocardiogram for structural evaluation including ejection fraction, assessment of regional wall motion abnormalities or valvular disease, and further evaluation of hemodynamics.  -Check Mg  -Wean Diltiazem gtt and then STOP  -STOP Heparin gtt.  -Start Metoprolol Succinate 25 mg daily. Hold for HR <50.  -Start Eliquis 5 mg BID  -Given new Afib and ST depressions while in RVR, recommend outpatient nuclear stress test for ischemic stress test as part of cardiac risk stratification prior to outpatient knee surgery.  -Follow up with Cardiology: Dr. Duncan in the next 2-3 weeks.     Further plan per Dr. Amaral   Code Status:  Full Code      Fátima Nelson, GUERLINE-CNP    ==========================  Attending note  ==========================  Both the MADDIE and I have had a face to  face encounter with the patient today. I have examined the patient and edited the documented physical examination as necessary.  I personally reviewed the patient's recent labs, medications, orders, EKGs, and pertinent cardiac imaging.  I have reviewed the MADDIE's encounter note, approve the MADDIE's documentation and have edited the note to reflect the diagnostic and therapeutic plan.    74-year-old female with a complex medical history including polymyalgia rheumatica, fibromyalgia (managed with nightly THC gummies), esophagitis, orthostatic hypotension, degenerative lumbar joint disease, osteoarthritis (s/p R TKA in 11/2022, L TKA pending), meningioma, chronic migraines, breast cancer (s/p lumpectomy), uterine cancer (s/p hysterectomy with radiation), cervical spinal fusion (C2-C5 for chordomas), resolved uterovaginal prolapse and stress incontinence, dyslipidemia, chronic back pain, and known right vertebral artery occlusion with reconstitution at C2, presented to [facility] ED on 3/28/25 after developing palpitations at her preoperative visit for L TKA. EKG showed atrial fibrillation with RVR () and ST depressions in inferior and anterolateral leads. She denied chest pain, SOB, lightheadedness, or syncope, but did report FORBES at baseline and nausea with emesis that morning; she also had recent norovirus and COVID infection. Initial workup revealed mild BNP elevation (193), normal TSH, negative troponins, normal labs, and no acute findings on chest X-ray. She was managed acutely with IV diltiazem, heparin, and digoxin, achieving conversion to sinus rhythm by 2215 on telemetry. Diltiazem and heparin drips will be discontinued; she will be started on metoprolol succinate 25 mg daily (hold for HR <50) and apixaban 5 mg BID. Magnesium level pending. Cardiology follow-up in 2-3 weeks recommended.    Past medical history:  As above.    Medications were reviewed.    Allergies were reviewed.    Vital signs, telemetry,  medications, labs, and imaging were reviewed as well.    Physical Exam:  General:  Patient is awake, alert, and oriented.  Patient is in no acute distress.  HEENT:  Pupils equal and reactive.  Normocephalic.  Moist mucosa.    Neck:  No thyromegaly.  Normal Jugular Venous Pressure.  Cardiovascular:  Regular rate and rhythm.  Normal S1 and S2. +Murmur  Pulmonary:  Clear to auscultation bilaterally.  Abdomen:  Soft. Non-tender.   Non-distended.  Positive bowel sounds.  Lower Extremities:  2+ pedal pulses. Trace BLE edema   Neurologic:  Cranial nerves intact.  No focal deficit.   Skin: Skin warm and dry, normal skin turgor.   Psychiatric: Normal affect.     Assessment/Plan   Roseanne Lira is a 74 y.o. 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 She was taken via EMS to [facility] ED after presenting at [facility] for planned left TKA with palpitations, EKG revealed , Afib w/ RVR. Cardiology consulted for Afib w/RVR.    Outpatient cardiac medications: Amlodipine 2.5 mg daily, Pravastatin 10 mg daily, Furosemide 20 mg daily prn edema (does not take)    Echocardiogram  CONCLUSIONS:   1. Left ventricular ejection fraction is normal, calculated by Pond's biplane at 74%.   2. Spectral Doppler shows a Grade I (impaired relaxation pattern) of left  ventricular diastolic filling with normal left atrial filling pressure.   3. There is a mild left ventricular outflow tract obstruction with the Valsalva maneuver. The provoked gradient is 25 mmHg.   4. There is normal right ventricular global systolic function.   5. Slightly elevated right ventricular systolic pressure.   6. Aortic valve sclerosis.   7. Reported right ventricular systolic pressure may be underestimated due to incomplete or suboptimal Doppler envelope.      #Afib w/ RVR  -EKG on admit revealed Afib w/ RVR, ,  with ST depression in inferior and anterolateral leads. HsTrop negative x2. No cp.   -[id number]-NAKy Score 6  -S/P Diltiazem 20 mg IVP, gtt currently at 15 mg/hr; 500 mcg Digoxin in ED  -Anticoagulated on Heparin gtt  -Converted to SR last night, now SB/SR HR 50-60s  #Murmur. Also with chronic FORBES (1 flight stairs). TTE pending. Exercise Stress Echo revealed normal LVEF 55-60%, diastolic dysfunction 5/2021.   #HTN  -controlled on outpatient regimen      Plan/Recs:  -Check Mg  -Wean Diltiazem gtt and then STOP  -STOP Heparin gtt.  -Start Metoprolol Succinate 25 mg daily. Hold for HR <50.  -Start Eliquis 5 mg BID  -Given new Afib and ST depressions while in RVR, recommend outpatient nuclear stress test for ischemic stress test as part of cardiac risk stratification prior to outpatient knee surgery.  -Follow up with Cardiology in the next 2-3 weeks.   -No barriers to discharge from a cardiological standpoint.   -Cardiology will sing off, please call if questions    Alex Amaral MD

## 2025-03-29 NOTE — H&P
History Of Present Illness  Roseanne Lira is a 74 y.o. female presenting with palpitations while in PAT for knee surgery   Noted to be in a fib rvr came to ED and was started on heparin and cardizem drip, received dig   Converted to sinus overnight   Seen cardio earlier today   Echo done showing ef normal and aortic valve sclerosis     Per pt palpitations 2 days ago as well  No h/o clot or bleeding issues  No chest pain   No shortness of breath   Did have vomiting earlier today and thinks that amlodipine came out  BP on higher side    Recovering from norovirus has had 2 episodes in past 6 weeks     Past Medical History  She has a past medical history of Age-related nuclear cataract, left eye (03/25/2017), Age-related nuclear cataract, right eye (03/25/2017), Allergy status to unspecified drugs, medicaments and biological substances, Arthritis, Asthma, Breast cancer in situ, Cortical age-related cataract, left eye (03/25/2017), Cortical age-related cataract, right eye (03/25/2017), Depression, Diarrhea, unspecified (09/25/2018), Dizziness and giddiness (06/10/2020), Elevated blood-pressure reading, without diagnosis of hypertension (10/16/2019), Encounter for general adult medical examination without abnormal findings (09/14/2020), Encounter for other preprocedural examination, Encounter for screening mammogram for malignant neoplasm of breast (06/03/2013), Epigastric pain (01/24/2020), Fibromyalgia, primary, GERD (gastroesophageal reflux disease), Headache, unspecified (10/19/2020), Hyperlipidemia, Hypertension, Intraductal carcinoma in situ of unspecified breast (04/01/2015), Joint pain, Leukoplakia of vulva, Long term (current) use of systemic steroids (08/27/2019), Malignant neoplasm of vertebral column (Multi) (09/09/2024), Meningioma (Multi), Nephrolithiasis, Other chest pain (10/16/2019), Other conditions influencing health status (10/25/2019), Other disturbances of skin sensation (05/19/2015), Other malaise  (08/23/2018), Other pericardial effusion (noninflammatory) ([facility]) (04/07/2015), Personal history of malignant neoplasm of bone (07/11/2019), Personal history of other diseases of the respiratory system (04/01/2015), Personal history of other diseases of the respiratory system (04/05/2017), Personal history of other diseases of the respiratory system (01/03/2014), Personal history of other specified conditions (03/30/2015), Personal history of urinary (tract) infections (06/23/2015), Personal history of urinary (tract) infections (04/05/2017), Polymyalgia rheumatica (Multi), Unspecified visual disturbance (07/07/2020), Urinary tract infection, site not specified (05/13/2021), and Uterine cancer (Multi).    She has no past medical history of Refusal of blood product.    Surgical History  She has a past surgical history that includes Spine surgery (06/03/2013); Other surgical history (06/03/2013); Breast surgery (06/03/2013); Colonoscopy (06/03/2013); Other surgical history (10/19/2020); Tubal ligation (05/19/2015); Other surgical history (03/13/2017); Other surgical history (03/13/2017); MR angio head wo IV contrast (07/27/2020); MR angio neck wo IV contrast (07/27/2020); MR angio head wo IV contrast (05/18/2017); MR angio neck wo IV contrast (05/18/2017); CT angio neck (12/09/2022); CT angio head w and wo IV contrast (12/09/2022); Breast lumpectomy (Right); Cataract extraction; and Hysterectomy.     Social History  She reports that she has never smoked. She has been exposed to tobacco smoke. She has never used smokeless tobacco. She reports that she does not currently use alcohol. She reports current drug use. Drug: Marijuana.    Family History  Family History   Problem Relation Name Age of Onset    Hypertension Mother      Dementia Mother      Hypertension Maternal Grandmother      Kidney disease Maternal Grandmother      Kidney disease Maternal Grandfather      Heart attack Maternal Grandfather      Alzheimer's  disease Mother's Sister      Breast cancer Mother's Sister  40        Allergies  Lovastatin, Codeine, Beclomethasone, Omeprazole, Other, Hydrocortisone, and Penicillins    Review of Systems     No chest pain   No shortness of breath  No cough  No fever  No chills  No nausea vomitign or constipation   No abdo pain   No wt loss  No change in urine     Physical Exam     Well developed well nurished  No distress  Ao 3  Face symmetrical   Neck no jvd no bruit  Chest clear  CVS regular  Ext no edema  Abdo soft nontender bs active, no masses  Cns alert appropriate able to move ext   Skin intact  Psych normal affect    Last Recorded Vitals  /76 (BP Location: Left arm, Patient Position: Lying)   Pulse 61   Temp 37.1 °C (98.8 °F) (Temporal)   Resp 19   Wt 87.1 kg (192 lb)   SpO2 95%     Relevant Results    Scheduled medications  amLODIPine, 2.5 mg, oral, BID  apixaban, 5 mg, oral, BID  calcium carbonate-vitamin D3, 1 tablet, oral, q AM  cetirizine, 10 mg, oral, q AM  cholecalciferol, 5,000 Units, oral, Daily  cyanocobalamin, 100 mcg, oral, q AM  famotidine, 20 mg, oral, Daily  metoprolol succinate XL, 25 mg, oral, Daily  polyethylene glycol, 17 g, oral, Daily  pravastatin, 20 mg, oral, Nightly  venlafaxine XR, 150 mg, oral, q AM  [START ON 3/30/2025] venlafaxine XR, 37.5 mg, oral, Daily with breakfast      Continuous medications     PRN medications  PRN medications: acetaminophen **OR** acetaminophen **OR** acetaminophen, acetaminophen **OR** acetaminophen **OR** acetaminophen, diclofenac sodium, guaiFENesin, prochlorperazine **OR** prochlorperazine **OR** prochlorperazine  Results for orders placed or performed during the hospital encounter of 03/28/25 (from the past 96 hours)   CBC and Auto Differential   Result Value Ref Range    WBC 9.6 4.4 - 11.3 x10*3/uL    nRBC 0.0 0.0 - 0.0 /100 WBCs    RBC 5.04 4.00 - 5.20 x10*6/uL    Hemoglobin 13.9 12.0 - 16.0 g/dL    Hematocrit 41.8 36.0 - 46.0 %    MCV 83 80 - 100 fL     MCH 27.6 26.0 - 34.0 pg    MCHC 33.3 32.0 - 36.0 g/dL    RDW 13.9 11.5 - 14.5 %    Platelets 404 150 - 450 x10*3/uL    Neutrophils % 66.8 40.0 - 80.0 %    Immature Granulocytes %, Automated 0.2 0.0 - 0.9 %    Lymphocytes % 22.9 13.0 - 44.0 %    Monocytes % 8.0 2.0 - 10.0 %    Eosinophils % 1.4 0.0 - 6.0 %    Basophils % 0.7 0.0 - 2.0 %    Neutrophils Absolute 6.41 (H) 1.60 - 5.50 x10*3/uL    Immature Granulocytes Absolute, Automated 0.02 0.00 - 0.50 x10*3/uL    Lymphocytes Absolute 2.20 0.80 - 3.00 x10*3/uL    Monocytes Absolute 0.77 0.05 - 0.80 x10*3/uL    Eosinophils Absolute 0.13 0.00 - 0.40 x10*3/uL    Basophils Absolute 0.07 0.00 - 0.10 x10*3/uL   Protime-INR   Result Value Ref Range    Protime 12.3 9.8 - 12.4 seconds    INR 1.1 0.9 - 1.1   B-Type Natriuretic Peptide   Result Value Ref Range     (H) 0 - 99 pg/mL   TSH with reflex to Free T4 if abnormal   Result Value Ref Range    Thyroid Stimulating Hormone 1.03 0.44 - 3.98 mIU/L   Troponin I, High Sensitivity, Initial   Result Value Ref Range    Troponin I, High Sensitivity 4 0 - 13 ng/L   Basic metabolic panel   Result Value Ref Range    Glucose 80 74 - 99 mg/dL    Sodium 140 136 - 145 mmol/L    Potassium 3.7 3.5 - 5.3 mmol/L    Chloride 107 98 - 107 mmol/L    Bicarbonate 23 21 - 32 mmol/L    Anion Gap 14 10 - 20 mmol/L    Urea Nitrogen 18 6 - 23 mg/dL    Creatinine 0.74 0.50 - 1.05 mg/dL    eGFR 84 >60 mL/min/1.73m*2    Calcium 9.2 8.6 - 10.3 mg/dL   Magnesium   Result Value Ref Range    Magnesium 2.17 1.60 - 2.40 mg/dL   Troponin, High Sensitivity, 1 Hour   Result Value Ref Range    Troponin I, High Sensitivity 6 0 - 13 ng/L   APTT   Result Value Ref Range    aPTT 186 (HH) 26 - 36 seconds   Heparin Assay, UFH   Result Value Ref Range    Heparin Unfractionated 1.0 See Comment Below for Therapeutic Ranges IU/mL   Basic metabolic panel   Result Value Ref Range    Glucose 107 (H) 74 - 99 mg/dL    Sodium 139 136 - 145 mmol/L    Potassium 3.7 3.5 - 5.3  mmol/L    Chloride 104 98 - 107 mmol/L    Bicarbonate 25 21 - 32 mmol/L    Anion Gap 14 10 - 20 mmol/L    Urea Nitrogen 15 6 - 23 mg/dL    Creatinine 0.81 0.50 - 1.05 mg/dL    eGFR 76 >60 mL/min/1.73m*2    Calcium 9.6 8.6 - 10.3 mg/dL   CBC   Result Value Ref Range    WBC 9.6 4.4 - 11.3 x10*3/uL    nRBC 0.0 0.0 - 0.0 /100 WBCs    RBC 5.10 4.00 - 5.20 x10*6/uL    Hemoglobin 13.8 12.0 - 16.0 g/dL    Hematocrit 42.5 36.0 - 46.0 %    MCV 83 80 - 100 fL    MCH 27.1 26.0 - 34.0 pg    MCHC 32.5 32.0 - 36.0 g/dL    RDW 14.1 11.5 - 14.5 %    Platelets 407 150 - 450 x10*3/uL   Magnesium   Result Value Ref Range    Magnesium 2.20 1.60 - 2.40 mg/dL   Heparin Assay, UFH   Result Value Ref Range    Heparin Unfractionated 0.9 See Comment Below for Therapeutic Ranges IU/mL   Transthoracic Echo (TTE) Complete   Result Value Ref Range    AV pk ana 2.06 m/s    AV mn grad 10 mmHg    LVOT diam 2.03 cm    MV E/A ratio 0.71     LA vol index A/L 28.3 ml/m2    Tricuspid annular plane systolic excursion 2.6 cm    LV EF 74 %    RV free wall pk S' 21.00 cm/s    LVIDd 5.03 cm    RVSP 31.2 mmHg    Aortic Valve Area by Continuity of VTI 3.22 cm2    Aortic Valve Area by Continuity of Peak Velocity 2.99 cm2    AV pk grad 17 mmHg    LV A4C EF 77.9      *Note: Due to a large number of results and/or encounters for the requested time period, some results have not been displayed. A complete set of results can be found in Results Review.          Assessment/Plan   Assessment & Plan  Atrial fibrillation with RVR (Multi)    Dyslipidemia    Gastro-esophageal reflux disease without esophagitis    Meningioma (Multi)    Autonomic dysfunction      Above noted   Dc plan home  Eliquis and metoprolol  Give evening dose of amlodpine now   Follow up pcp and cardiology        Brayden Rutledge MD

## 2025-03-29 NOTE — CARE PLAN
Problem: Pain - Adult  Goal: Verbalizes/displays adequate comfort level or baseline comfort level  Outcome: Progressing     Problem: Safety - Adult  Goal: Free from fall injury  Outcome: Progressing     Problem: Nutrition  Goal: Nutrient intake appropriate for maintaining nutritional needs  Outcome: Progressing   The patient's goals for the shift include hr<100    The clinical goals for the shift include pt will remain in NSR through end of shift    Over the shift, the patient did not make progress toward the following goals. Barriers to progression include . Recommendations to address these barriers include .

## 2025-03-29 NOTE — CARE PLAN
The patient's goals for the shift include hr<100    The clinical goals for the shift include remain hemodynamically stable    Over the shift, the patient did not make progress toward the following goals. Barriers to progression include   Problem: Pain - Adult  Goal: Verbalizes/displays adequate comfort level or baseline comfort level  Outcome: Progressing     Problem: Safety - Adult  Goal: Free from fall injury  Outcome: Progressing     Problem: Discharge Planning  Goal: Discharge to home or other facility with appropriate resources  Outcome: Progressing     Problem: Chronic Conditions and Co-morbidities  Goal: Patient's chronic conditions and co-morbidity symptoms are monitored and maintained or improved  Outcome: Progressing     Problem: Nutrition  Goal: Nutrient intake appropriate for maintaining nutritional needs  Outcome: Progressing   . Recommendations to address these barriers include .

## 2025-03-29 NOTE — NURSING NOTE
Pt received as admit from ED to 409 via cart.  Pt is awake, alert and oriented, christiansen, able to stand and pivot to bed.  Respirations restful on room air.  HOB elevated.  Pt placed on monitor which shows afib 100-130's at present.  Pt denies pain, sob or palpitations.  HOB elevated.  Heparin gtt infusing via LFA at 1600units/hr, cardizem gtt also infusing at 15cc

## 2025-03-30 NOTE — PROGRESS NOTES
Patient is a 74-year-old female presents today for discussion upcoming left total knee arthroplasty having decided to proceed with surgery.  She is failed a greater than 3-month trial reasonable conservative treatment including cortisone injections, Tylenol, Voltaren gel and a home exercise program.  She rates her pain as 8 out of 10.  She has difficulty walking short distance going downstairs.  She would like to proceed with total knee arthroplasty which is indicated at this time.    Left knee:  AAOx3, NAD, walks with a moderate antalgic gait  Varus allignment  Range of motion lacks 10 degrees of full extension and flexes to 110 degrees  Stable to varus/valgus/anterior/posterior stress through out the range of motion  Slight laxity with varus stress  Diffuse medial  joint line tenderness to palpation  Moderate effusion  SILT in a glenna/saph/per/tib distribution  5/5 knee extension/df/pf/ehl  ½ dorsalis pedis and posterior tibial pulse  no popliteal lymphadenopathy  no other overlying lesions  mood: euthymic  Respirations non labored    Plain films were reviewed by myself in clinic today.  They have advanced osteoarthritis of their knee with significant joint space narrowing, bone on bone changes, underlying sclerosis and tricompartmental osteophytic change.    Patient is now failed a greater than 3-month trial of reasonable conservative treatment and wishes proceed with surgery which is indicated at this time.  Discussed the risk benefits alternatives to surgery.  All of their questions were answered.    Procedure: left total knee  Location: [facility] [place]  ICD10: M17.12  CPT: 80706  Stay: overnight  Equipment: Tagwhat    I talked with the patient at length about risks, limitations, benefits and alternatives to total knee replacement today. I reviewed concerns about implant wear, loosening, breakage, infection and infection prophylaxis, DVT, PE, death and other medical and anesthetic complications of surgery. We  talked about the potential for persistent pain following surgery since there are many possible causes for knee and leg pain. The patient was advised that knee replacement will only relieve pain that is coming from the knee. We talked about limited range of motion following knee replacement and the importance of physical therapy and their motivation. We talked about improvements in pain management post-operatively and our accelerated rehab program. We talked about wound healing issues and neurovascular complications of surgery. I reviewed functional and activity restrictions in detail. We discussed the fact that many of our patients are able to go home in 1 day or less depending on their health, mobility, pre-op preparation, individual home situation and personal preference.  The patient has identified their personal goals of their joint replacement surgery and recovery and we have discussed them. These are documented in our [employer] patient engagement platform. In addition, we have discussed the advantages and disadvantages of various implant and fixation options, as well as various surgical approaches.  The basic concepts of the joint replacement procedure has been reviewed with the patient and the patient has been provided the opportunity to see an actual implant either in the office or in our pre-op education class.  All of the patients questions were answered. The patient can call my office to schedule surgery and the pre-op teaching class. I told the patient that they should contact their primary care physician to discuss fitness for surgery.    This note was created using voice recognition software and was not corrected for typographical or grammatical errors.

## 2025-03-31 DIAGNOSIS — I48.91 ATRIAL FIBRILLATION, UNSPECIFIED TYPE (MULTI): ICD-10-CM

## 2025-03-31 LAB
Q ONSET: 227 MS
QRS COUNT: 25 BEATS
QRS DURATION: 82 MS
QT INTERVAL: 298 MS
QTC CALCULATION(BAZETT): 478 MS
QTC FREDERICIA: 408 MS
R AXIS: -10 DEGREES
T AXIS: 155 DEGREES
T OFFSET: 376 MS
VENTRICULAR RATE: 155 BPM

## 2025-04-03 ENCOUNTER — TELEPHONE (OUTPATIENT)
Dept: CARDIOLOGY | Facility: CLINIC | Age: 75
End: 2025-04-03
Payer: MEDICARE

## 2025-04-03 DIAGNOSIS — K21.9 GASTRO-ESOPHAGEAL REFLUX DISEASE WITHOUT ESOPHAGITIS: ICD-10-CM

## 2025-04-03 NOTE — TELEPHONE ENCOUNTER
Spoke with patient status post receipt of her message inquiring about upcoming appointment with Dr. Garg. Patient provided update that she needs a cardiologist to manage her blood pressure. Explained that general cardiologists manage blood pressure and cardiovascular disease. Provided Roseanne with the names of a few general cardiologists and scheduling office number. Encouraged her to call to schedule with general cardiology for BP management and cardiac clearance (stress test mentioned). Explained that she may keep 5/13/2025 appointment with Dr. Garg for atrial fibrillation follow up. Patient verbalized understanding and is in agreement with this plan.     ----- Message from Christel [email or web address] sent at 4/3/2025 10:05 AM EDT -----  Regarding: Sooner apt / med question  Hello,    The patient called to inquire about the possibility of rescheduling her appointment originally set for May 14th to an earlier date. She was discharged from the emergency room last week with atrial fibrillation. Additionally, she had a question regarding her medications, specifically whether it is safe to take both amlodipine and metoprolol together. I informed her that she is on a waitlist for an earlier appointment, but it is uncertain whether Dr. Garg will be able to provide guidance without having previously seen her. She plans to follow up with her primary care physician on Monday.    Thank you   Hernando

## 2025-04-04 RX ORDER — FAMOTIDINE 40 MG/1
40 TABLET, FILM COATED ORAL NIGHTLY
Qty: 90 TABLET | Refills: 0 | Status: SHIPPED | OUTPATIENT
Start: 2025-04-04

## 2025-04-07 ENCOUNTER — TELEPHONE (OUTPATIENT)
Dept: PRIMARY CARE | Facility: CLINIC | Age: 75
End: 2025-04-07
Payer: MEDICARE

## 2025-04-07 NOTE — TELEPHONE ENCOUNTER
Pt developed a fib prior to knee replacement, she is now on anticoagulation and beta blockers. Knee replacement was canceled. She may consider second opinion from Ortho. She has follow up with cardiologist in 3 weeks.

## 2025-04-10 NOTE — PROGRESS NOTES
Urogynecology  Provider:  Anastacia Pack MD  [phone]    ASSESSMENT AND PLAN:   74 year old female with LS and hx of uterovaginal prolapse and MIKALA that is now resolved s/p TLH, BS, and bilateral SLND by Dr. Demetria Trent with combined case LSC, USLS, MUS, and cystoscopy by Dr. Pack on 8/18/2023. 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     #1 Uterovaginal prolapse (resolved)  #2 Stress urinary incontinence (resolved)   #3 Lichen sclerosus   #4 Breast cancer screening  #5 Endometrial cancer, stage 1 (followed by Gyn/Oncology - Dr. Demetria Trent)     Plan:   1. Uterovaginal prolapse, MIKALA   - 8/18/2023 surgery: TLH, BS, and bilateral SLND by Dr. Demetria Trent with combined case LSC, USLS, MUS, and cystoscopy by Dr. Pack.   - No evidence of recurrent POP or MIKALA symptoms. There is no evidence of mesh erosion from the MUS visualized or palpated upon pelvic exam today.      2. LS  - Upon vulvar exam she has skin changes consistent with stable lichen sclerosus but no concerning lesions or masses.   - We sent Rx for her to continue using topical Clotrimazole-Betamethasone cream on her vulva and reassured her she can use this low-potency steroid/antifungal cream up to BID PRN to help reduce symptoms of LS.      3. Endometrial cancer, stage 1  - Patient has completed RT and is now pursuing surveillance visits every 6 months with Dr. Demetria Trent in Gyn/Oncology but is presently BINH.      4. Breast cancer screening  - Her last mammogram was on 2/7/2024 that returned BI-RADS category 2 - benign finding and requires no further action.   - Bilateral mammogram with tomosynthesis requisition sent today.     Follow up in 1 year with Dr. Pakc for an annual pelvic exam to assess her midurethral sling mesh.     Scribe Attestation  By signing my name below, I, Cesar Aguilar,  Scribe, attest that this documentation has been prepared under the direction and in the presence of Anastacia Pack MD on 04/11/2025 at 6:17 PM.     Agree with above. I Dr. Pack, personally performed the services described in the documentation which was scribed virtually and confirm it is both complete and accurate.  Anastacia Pack MD        Problem List Items Addressed This Visit          Genitourinary and Reproductive    Visit for screening mammogram    Relevant Orders    BI mammo bilateral screening tomosynthesis       Skin    Lichen sclerosus    Relevant Medications    clotrimazole-betamethasone (Lotrisone) cream     Other Visit Diagnoses       Overactive bladder    -  Primary    Relevant Orders    Measure post void residual    POCT UA Automated manually resulted                I spent a total of eConsult Time: 25 minutes in face to face and non face to face time.        Anastacia Pack MD        HISTORY OF PRESENT ILLNESS:   74 year old female presenting in follow up for LS and OAB.     Records Review:   - Last visit 10/2024  #1 Uterovaginal prolapse (resolved)  #2 Stress urinary incontinence (resolved)   #3 Lichen sclerosus   #4 Overactive bladder, urge incontinence      Plan:   1. Uterovaginal prolapse, MIKALA   - PVR = 0mL by bladder U/S.  - 8/18/2023: TLH, BS, and bilateral SLND by Dr. Demetria Trent with combined case LSC, USLS, MUS, and cystoscopy by Dr. Pack. Comorbidities include:   - No evidence of POP or MIKALA recurrence per patient subjective symptoms. There is no evidence of mesh erosion from the MUS visualized or palpated upon pelvic exam today.      2. LS  - Upon vulvar exam she has skin changes consistent with lichen sclerosus with a small area of a hematoma from irritation but no concerning lesions or masses.   - We advised her to continue using topical Clotrimazole-Betamethasone cream on her vulva and reassured her she can use this low-potency steroid/antifungal cream up to BID to  help reduce symptoms of LS.      3. OAB, UUI  - We discussed the etiology of overactive bladder with the neurological aging process. We reviewed that OAB symptoms occur when the detrusor muscle contracts/squeezes allowing the feeling of urgency and frequency prior to the bladder actually being full enough to void (I.e. inappropriate bladder spasms).   - We discussed OAB lifestyle changes (i.e., trying to limit fluids to 60oz in total per day, timed voiding every 2-3 hours, stop drinking fluids 3 hours prior to bedtime, and avoiding/limiting bladder irritants such as caffeine, nicotine, artificial sweeteners, acidic/spicy foods, and alcohol).  - Discussed OAB treatment options such as lifestyle changes, PFPT, medications, PTNS, intradetrusor Botox injections, or SNM.   - She is amenable to continuing Kegel exercises and lifestyle changes at this time; declines interest in staring an OAB medication.      4. Endometrial cancer, stage 1  - Followed by Dr. Demetria Trent in Gyn/Onc.   - Recently completed RT and is now pursuing surveillance visits every 3 months but is presently BINH.       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     Urinary Symptoms:   - Denies experiencing any OAB symptoms and has normal bladder compliance/function.     Vulvar Symptoms:  - She continues to use Lotrisone cream PRN to reduce LS flares.   - No recent LS itching flares.     Health Maintenance and Screenings:   - Patient has completed RT for stage 1 endometrial cancer and is now pursuing surveillance visits every 6 months with Dr. Demetria Trent in Gyn/Oncology but is presently BINH.    - Her last mammogram was on 2/7/2024 that returned BI-RADS category 2 - benign finding and requires no further action.        Past Medical History:    Past Medical History:   Diagnosis Date    Age-related nuclear cataract, left eye 03/25/2017    Age-related nuclear cataract of left eye     Age-related nuclear cataract, right eye 03/25/2017    Age-related nuclear cataract of right eye    Allergy status to unspecified drugs, medicaments and biological substances     History of seasonal allergies    Arthritis     Asthma     Breast cancer in situ     Cortical age-related cataract, left eye 03/25/2017    Cortical age-related cataract of left eye    Cortical age-related cataract, right eye 03/25/2017    Cortical age-related cataract of right eye    Depression     Diarrhea, unspecified 09/25/2018    Acute diarrhea    Dizziness and giddiness 06/10/2020    Postural dizziness with presyncope    Elevated blood-pressure reading, without diagnosis of hypertension 10/16/2019    Prehypertension    Encounter for general adult medical examination without abnormal findings 09/14/2020    Preventative health care    Encounter for other preprocedural examination     Preoperative exam for gynecologic surgery    Encounter for screening mammogram for malignant neoplasm of breast 06/03/2013    Visit for screening mammogram    Epigastric pain 01/24/2020    Abdominal pain, acute, epigastric    Fibromyalgia, primary     GERD (gastroesophageal reflux disease)     Headache, unspecified 10/19/2020    Sinus headache    Hyperlipidemia     Hypertension     Intraductal carcinoma in situ of unspecified breast 04/01/2015    DCIS (ductal carcinoma in situ) of breast    Joint pain     Leukoplakia of vulva     Lichen sclerosus of female genitalia    Long term (current) use of systemic steroids 08/27/2019    Current chronic use of systemic steroids    Malignant neoplasm of vertebral column (Multi) 09/09/2024    Meningioma (Multi)     Nephrolithiasis     Other chest pain 10/16/2019    Atypical chest pain    Other conditions influencing health status 10/25/2019    Chronic use of steroids    Other disturbances of skin sensation 05/19/2015    Burning sensation    Other malaise 08/23/2018    Malaise and fatigue    Other pericardial effusion  (noninflammatory) ([facility]) 04/07/2015    Pericardial effusion    Personal history of malignant neoplasm of bone 07/11/2019    History of malignant neoplasm of bone    Personal history of other diseases of the respiratory system 04/01/2015    History of asthma    Personal history of other diseases of the respiratory system 04/05/2017    History of acute bronchitis    Personal history of other diseases of the respiratory system 01/03/2014    History of upper respiratory infection    Personal history of other specified conditions 03/30/2015    History of abdominal pain    Personal history of urinary (tract) infections 06/23/2015    History of urinary tract infection    Personal history of urinary (tract) infections 04/05/2017    History of acute cystitis    Polymyalgia rheumatica (Multi)     Unspecified visual disturbance 07/07/2020    Visual disturbances    Urinary tract infection, site not specified 05/13/2021    Acute UTI    Uterine cancer (Multi)           Past Surgical History:     Past Surgical History:   Procedure Laterality Date    BREAST LUMPECTOMY Right     with radiation in 2011    BREAST SURGERY  06/03/2013    Breast Surgery    CATARACT EXTRACTION      COLONOSCOPY  06/03/2013    Complete Colonoscopy    CT ANGIO NECK  12/09/2022    CT NECK ANGIO W AND WO IV CONTRAST 12/9/2022 [facility]    CT HEAD ANGIO W AND WO IV CONTRAST  12/09/2022    CT HEAD ANGIO W AND WO IV CONTRAST 12/9/2022 [facility]    HYSTERECTOMY      MR HEAD ANGIO WO IV CONTRAST  07/27/2020    [place] HEAD ANGIO [place] [facility] [facility]    MR HEAD ANGIO WO IV CONTRAST  05/18/2017    MR HEAD ANGIO WO IV CONTRAST [place] [facility]    MR NECK ANGIO WO IV CONTRAST  07/27/2020    MR NECK ANGIO WO IV CONTRAST [place] [facility]    MR NECK ANGIO WO IV CONTRAST  05/18/2017    MR NECK ANGIO WO IV CONTRAST [place]18/[place] [facility]    OTHER SURGICAL HISTORY  06/03/2013    Craniotomy  Supratentorial Excision Of Meningioma    OTHER SURGICAL HISTORY  10/19/2020    Cataract surgery    OTHER SURGICAL HISTORY  03/13/2017    Arthrodesis Cervical c2-5 fusion    OTHER SURGICAL HISTORY  03/13/2017    Craniotomy Infratentorial Excision Of Meningioma    SPINE SURGERY  06/03/2013    Spine Repair fused for chordoma 1968    TUBAL LIGATION  05/19/2015    Tubal Ligation         Medications:     Prior to Admission medications    Medication Sig Start Date End Date Taking? Authorizing Provider   acetaminophen (Tylenol) 500 mg tablet Take 2 tablets (1,000 mg) by mouth every 6 hours if needed for mild pain (1 - 3). 3/29/25   Brayden Rutledge MD   amLODIPine (Norvasc) 2.5 mg tablet TAKE 1 TABLET (2.5 MG) BY MOUTH 2 TIMES A DAY. 10/21/24   Page Lopez MD   apixaban (Eliquis) 5 mg tablet Take 1 tablet (5 mg) by mouth 2 times a day. For a fib 3/29/25 4/28/25  Brayden Rutledge MD   calcium carbonate-vitamin D3 600 mg-20 mcg (800 unit) tablet Take 1 tablet by mouth once daily in the morning.    Historical Provider, MD   cetirizine (ZyrTEC) 10 mg tablet Take 1 tablet (10 mg) by mouth once daily in the morning. 9/16/13   Historical Provider, MD   cholecalciferol (Vitamin D-3) 5,000 Units tablet Take 1 tablet (5,000 Units) by mouth early in the morning..    Historical Provider, MD   cyanocobalamin (Vitamin B-12) 100 mcg tablet Take 1 tablet (100 mcg) by mouth once daily in the morning.    Historical Provider, MD   diclofenac sodium (Voltaren Arthritis Pain) 1 % gel Apply 4.5 inches (4 g) topically 2 times a day as needed.    Historical Provider, MD   famotidine (Pepcid) 40 mg tablet TAKE 1 TABLET (40 MG) BY MOUTH ONCE DAILY AT BEDTIME. 4/4/25   Jesus Staley MD   medical cannabis Take 1 each by mouth once daily at bedtime.    Historical Provider, MD   metoprolol succinate XL (Toprol-XL) 25 mg 24 hr tablet Take 1 tablet (25 mg) by mouth once daily. Do not crush or chew. 3/30/25 4/29/25  Brayden Rutledge MD   pravastatin  (Pravachol) 10 mg tablet TAKE 1 TABLET BY MOUTH AT BEDTIME 10/21/24   Page Lopez MD   venlafaxine XR (Effexor-XR) 150 mg 24 hr capsule Take 1 capsule (150 mg) by mouth once daily in the morning. With 37.5 tablet  every morning    Historical Provider, MD   venlafaxine XR (Effexor-XR) 37.5 mg 24 hr capsule TAKE ONE ALONG WITH 150 MG DAILY 7/24/23   Historical Provider, MD   vitamin E mixed 400 unit capsule Take 1 capsule by mouth once daily in the morning.    Historical Provider, MD   famotidine (Pepcid) 40 mg tablet Take 1 tablet (40 mg) by mouth once daily at bedtime. 20mg every morning and 40mg at night 3/29/25 4/4/25  MD RENZO Villa  Review of Systems   Constitutional: Negative.    HENT: Negative.     Eyes: Negative.    Respiratory: Negative.     Cardiovascular: Negative.    Gastrointestinal: Negative.    Endocrine: Negative.    Genitourinary: Negative.    Musculoskeletal: Negative.    Neurological: Negative.    Psychiatric/Behavioral: Negative.          POC Blood, Urine   Date Value Ref Range Status   10/21/2024 NEGATIVE NEGATIVE Final     Poc Nitrite, Urine   Date Value Ref Range Status   10/21/2024 NEGATIVE NEGATIVE Final     POC Urobilinogen, Urine   Date Value Ref Range Status   10/21/2024 0.2 0.2, 1.0 EU/DL Final     POC Leukocytes, Urine   Date Value Ref Range Status   10/21/2024 TRACE (A) NEGATIVE Final         PHYSICAL EXAM:    /75 (BP Location: Left arm, Patient Position: Sitting)   Pulse 60   Wt 88 kg (194 lb)   BMI 33.28 kg/m²   No LMP recorded. Patient has had a hysterectomy.      Declines chaperone for physical exam.      Well developed, well nourished, in no apparent distress.   Neurologic/Psychiatric:  Awake, Alert and Oriented times 3.  Affect normal.     GENITAL/URINARY:     External Genitalia:  The patient has normal appearing external genitalia, normal skenes and bartholins glands, and a normal hair distribution.  Her vulva is without lesions, erythema or  discharge.  It is non-tender with appropriate sensation. There are skin changes consistent with stable lichen sclerosus, no lesions or masses on the labia, perineum, or around the anus.     Urethral Meatus:  Size normal, Location normal, Lesions absent, Prolapse absent.     Urethra:  Fullness absent, Masses absent.     Bladder:  Fullness absent, Masses absent, Tenderness absent.     Vagina:  General appearance normal, Discharge absent, Lesions absent. Moderate vaginal atrophy. No mesh erosion from the midurethral sling visualized or palpated. No significant pelvic organ prolapse.     Cervix: surgically absent   Uterus:  surgically absent  Adnexa: normal, no masses or tenderness over the bilateral adnexa      Anus/Perineum:  Lesions absent and masses absent. No hemorrhoids. Normal appearing anus and perineum.     Stress urinary incontinence not demonstrable.         POP-Q:  Stage: 0  Position: supine lithotomy     Rectum: Normal.       Data and DIAGNOSTIC STUDIES REVIEWED   Imaging  No results found.    Cardiology, Vascular, and Other Imaging  No other imaging results found for the past 7 days     Lab Results   Component Value Date    URINECULTURE CANCELED 08/14/2023      Lab Results   Component Value Date    GLUCOSE 107 (H) 03/29/2025    CALCIUM 9.6 03/29/2025     03/29/2025    K 3.7 03/29/2025    CO2 25 03/29/2025     03/29/2025    BUN 15 03/29/2025    CREATININE 0.81 03/29/2025     Lab Results   Component Value Date    WBC 9.6 03/29/2025    HGB 13.8 03/29/2025    HCT 42.5 03/29/2025    MCV 83 03/29/2025     03/29/2025          Anastacia Pack MD

## 2025-04-11 ENCOUNTER — APPOINTMENT (OUTPATIENT)
Dept: OBSTETRICS AND GYNECOLOGY | Facility: CLINIC | Age: 75
End: 2025-04-11
Payer: MEDICARE

## 2025-04-11 VITALS
DIASTOLIC BLOOD PRESSURE: 75 MMHG | HEART RATE: 60 BPM | WEIGHT: 194 LBS | SYSTOLIC BLOOD PRESSURE: 119 MMHG | BODY MASS INDEX: 33.28 KG/M2

## 2025-04-11 DIAGNOSIS — N32.81 OVERACTIVE BLADDER: Primary | ICD-10-CM

## 2025-04-11 DIAGNOSIS — L90.0 LICHEN SCLEROSUS: ICD-10-CM

## 2025-04-11 DIAGNOSIS — Z12.31 VISIT FOR SCREENING MAMMOGRAM: ICD-10-CM

## 2025-04-11 PROCEDURE — 3078F DIAST BP <80 MM HG: CPT | Performed by: OBSTETRICS & GYNECOLOGY

## 2025-04-11 PROCEDURE — 1159F MED LIST DOCD IN RCRD: CPT | Performed by: OBSTETRICS & GYNECOLOGY

## 2025-04-11 PROCEDURE — 1036F TOBACCO NON-USER: CPT | Performed by: OBSTETRICS & GYNECOLOGY

## 2025-04-11 PROCEDURE — 1126F AMNT PAIN NOTED NONE PRSNT: CPT | Performed by: OBSTETRICS & GYNECOLOGY

## 2025-04-11 PROCEDURE — 99213 OFFICE O/P EST LOW 20 MIN: CPT | Performed by: OBSTETRICS & GYNECOLOGY

## 2025-04-11 PROCEDURE — 3074F SYST BP LT 130 MM HG: CPT | Performed by: OBSTETRICS & GYNECOLOGY

## 2025-04-11 PROCEDURE — 1123F ACP DISCUSS/DSCN MKR DOCD: CPT | Performed by: OBSTETRICS & GYNECOLOGY

## 2025-04-11 RX ORDER — CLOTRIMAZOLE AND BETAMETHASONE DIPROPIONATE 10; .64 MG/G; MG/G
1 CREAM TOPICAL 2 TIMES DAILY PRN
Qty: 45 G | Refills: 3 | Status: SHIPPED | OUTPATIENT
Start: 2025-04-11 | End: 2025-05-09

## 2025-04-11 ASSESSMENT — ENCOUNTER SYMPTOMS
RESPIRATORY NEGATIVE: 1
MUSCULOSKELETAL NEGATIVE: 1
CONSTITUTIONAL NEGATIVE: 1
PSYCHIATRIC NEGATIVE: 1
ENDOCRINE NEGATIVE: 1
EYES NEGATIVE: 1
GASTROINTESTINAL NEGATIVE: 1
NEUROLOGICAL NEGATIVE: 1
CARDIOVASCULAR NEGATIVE: 1

## 2025-04-11 ASSESSMENT — PAIN SCALES - GENERAL: PAINLEVEL_OUTOF10: 0-NO PAIN

## 2025-04-13 ENCOUNTER — PATIENT MESSAGE (OUTPATIENT)
Dept: CARDIOLOGY | Facility: CLINIC | Age: 75
End: 2025-04-13
Payer: MEDICARE

## 2025-04-13 DIAGNOSIS — I48.91 ATRIAL FIBRILLATION WITH RVR (MULTI): ICD-10-CM

## 2025-04-15 RX ORDER — METOPROLOL SUCCINATE 25 MG/1
25 TABLET, EXTENDED RELEASE ORAL DAILY
Qty: 14 TABLET | Refills: 0 | Status: SHIPPED | OUTPATIENT
Start: 2025-04-15 | End: 2025-05-15

## 2025-04-16 ENCOUNTER — DOCUMENTATION (OUTPATIENT)
Dept: PHYSICAL THERAPY | Facility: CLINIC | Age: 75
End: 2025-04-16
Payer: MEDICARE

## 2025-04-16 NOTE — PROGRESS NOTES
Discharge Summary    Name: Roseanne Lira  MRN: [id number]  : 1950  Date: 25    Treatment Diagnosis:   Problem List Items Addressed This Visit    None      Discharge Summary: PT    Discharge Information: Date of Discharge 2025    Therapy Summary: Patient has attended 10 physical therapy visits. They made fair progress towards their goals and were compliant with their HEP. Therefore at this time, they are discharged from physical therapy intervention and referred back to their physician for further evaluation for pain relief techniques.    Rehab Discharge Reason: Failed to schedule and/or keep follow-up appointment(s)  Progress limited by severe OA. Patient to consider TKA. Last seen 10/10/24

## 2025-04-21 ENCOUNTER — HOSPITAL ENCOUNTER (OUTPATIENT)
Dept: RADIOLOGY | Facility: CLINIC | Age: 75
Discharge: HOME | End: 2025-04-21
Payer: MEDICARE

## 2025-04-21 ENCOUNTER — APPOINTMENT (OUTPATIENT)
Dept: OBSTETRICS AND GYNECOLOGY | Facility: CLINIC | Age: 75
End: 2025-04-21
Payer: MEDICARE

## 2025-04-21 VITALS — BODY MASS INDEX: 33.12 KG/M2 | HEIGHT: 64 IN | WEIGHT: 194 LBS

## 2025-04-21 DIAGNOSIS — Z12.31 VISIT FOR SCREENING MAMMOGRAM: ICD-10-CM

## 2025-04-21 PROCEDURE — 77063 BREAST TOMOSYNTHESIS BI: CPT | Performed by: RADIOLOGY

## 2025-04-21 PROCEDURE — 77063 BREAST TOMOSYNTHESIS BI: CPT

## 2025-04-21 PROCEDURE — 77067 SCR MAMMO BI INCL CAD: CPT | Performed by: RADIOLOGY

## 2025-04-28 ENCOUNTER — HOSPITAL ENCOUNTER (OUTPATIENT)
Dept: RADIOLOGY | Facility: CLINIC | Age: 75
Discharge: HOME | End: 2025-04-28
Payer: MEDICARE

## 2025-04-28 ENCOUNTER — APPOINTMENT (OUTPATIENT)
Dept: ORTHOPEDIC SURGERY | Facility: CLINIC | Age: 75
End: 2025-04-28
Payer: MEDICARE

## 2025-04-28 ENCOUNTER — TELEPHONE (OUTPATIENT)
Dept: CARDIOLOGY | Facility: HOSPITAL | Age: 75
End: 2025-04-28

## 2025-04-28 DIAGNOSIS — M17.12 PRIMARY OSTEOARTHRITIS OF LEFT KNEE: Primary | ICD-10-CM

## 2025-04-28 DIAGNOSIS — M16.12 PRIMARY LOCALIZED OSTEOARTHRITIS OF LEFT HIP: ICD-10-CM

## 2025-04-28 DIAGNOSIS — I48.91 ATRIAL FIBRILLATION WITH RVR (MULTI): ICD-10-CM

## 2025-04-28 DIAGNOSIS — M17.12 PRIMARY OSTEOARTHRITIS OF LEFT KNEE: ICD-10-CM

## 2025-04-28 PROCEDURE — 1036F TOBACCO NON-USER: CPT

## 2025-04-28 PROCEDURE — 73564 X-RAY EXAM KNEE 4 OR MORE: CPT | Mod: LEFT SIDE | Performed by: RADIOLOGY

## 2025-04-28 PROCEDURE — 73564 X-RAY EXAM KNEE 4 OR MORE: CPT | Mod: LT

## 2025-04-28 PROCEDURE — 99214 OFFICE O/P EST MOD 30 MIN: CPT

## 2025-04-28 PROCEDURE — 73502 X-RAY EXAM HIP UNI 2-3 VIEWS: CPT | Mod: LT

## 2025-04-28 PROCEDURE — 1159F MED LIST DOCD IN RCRD: CPT

## 2025-04-28 PROCEDURE — 1123F ACP DISCUSS/DSCN MKR DOCD: CPT

## 2025-04-28 PROCEDURE — 73502 X-RAY EXAM HIP UNI 2-3 VIEWS: CPT | Mod: LEFT SIDE | Performed by: RADIOLOGY

## 2025-04-28 RX ORDER — METOPROLOL SUCCINATE 25 MG/1
25 TABLET, EXTENDED RELEASE ORAL DAILY
Qty: 14 TABLET | Refills: 0 | Status: SHIPPED | OUTPATIENT
Start: 2025-04-28 | End: 2025-04-28 | Stop reason: SDUPTHER

## 2025-04-28 RX ORDER — METOPROLOL SUCCINATE 25 MG/1
25 TABLET, EXTENDED RELEASE ORAL DAILY
Qty: 90 TABLET | Refills: 3 | Status: SHIPPED | OUTPATIENT
Start: 2025-04-28 | End: 2026-04-28

## 2025-04-28 NOTE — PROGRESS NOTES
"      JONATHON Oropeza, PAAnibalC  Division of Adult Reconstruction  Phone: [phone]  Fax: [phone]    PRIMARY CARE PHYSICIAN: Page Lopez MD  REFERRING PROVIDER: Page Lopez MD  [facility], [address]  [address] [place] [address]         [facility]  Roseanne Lira is a pleasant 74 y.o. year-old female who is seen today for evaluation of left knee pain. The pain has been present for many years. She was scheduled for right TKA with Dr. Dillon this month but cancelled due to ***. She rates the pain a 8 out of 10. They do not associate with a traumatic injury. The patient states that the pain is located in the {MEDIAL/LAT/ANT/POST DETAILED:14345}. The pain is aggravated by {KWAGGRAVATING FACTOR:53484}. The patient has tried Tylenol, HEP, Voltaren gel, hyaluronic gel injections, and steroid injections{[facility] Non Op Treatments:83365::\"over the counter medications\"} without sufficient relief of symptoms. The patient denies any history of inflammatory arthritis. The patient denies any numbness or tingling of the side: left lower extremity.      History of knee surgery: Right TKA (2023) with Dr. Dillon - No post surgical complications with regard to wound healing or ROM.   Functional status: occasionally limited   Instability: {YES,NO:98365}  Symptoms impacting sleep: {YES,NO:69772}  Impacting quality of life: Yes  Pain level: 8  Back pain: Yes  Hip/groin pain: Yes  Radicular symptoms: {kwside:61533}    Pertinent PMHx includes: HTN, AF with RVR on Eliquis, stage 3a CKD, meningiomas of the brain, uterine cancer, DCIS of right breast, polymyalgia rheumatica, aneurysm of vertebral artery, carotid artery stenosis, and fibromyalgia.    Alcohol Use: No  Tobacco Use: No      Review of systems  There has been no interval change in this patient's past medical, surgical, medications, allergies, family history or social history since the most recent visit to a provider " within our department. Adult patient history sheet was filled out by the patient today in clinic and will be scanned into the EMR. I personally reviewed this form which will be scanned into the EMR. 14 point review of systems was performed, reviewed, and negative except for pertinent positives documented in the history of present illness.    Medical History[1]  Problem List[2]  Medication Documentation Review Audit       Reviewed by Reese Eden LPN (Licensed Nurse) on 04/11/25 at 1519      Medication Order Taking? Sig Documenting Provider Last Dose Status   acetaminophen (Tylenol) 500 mg tablet [id number]  Take 2 tablets (1,000 mg) by mouth every 6 hours if needed for mild pain (1 - 3). Brayden Rutledge MD  Active   amLODIPine (Norvasc) 2.5 mg tablet [id number] No TAKE 1 TABLET (2.5 MG) BY MOUTH 2 TIMES A DAY. Page Lopez MD 3/28/2025 Morning Active   apixaban (Eliquis) 5 mg tablet [id number]  Take 1 tablet (5 mg) by mouth 2 times a day. For a fib Brayden Rutledge MD  Active   calcium carbonate-vitamin D3 600 mg-20 mcg (800 unit) tablet [id number] No Take 1 tablet by mouth once daily in the morning. Historical Provider, MD 3/27/2025 Morning Active   cetirizine (ZyrTEC) 10 mg tablet [id number] No Take 1 tablet (10 mg) by mouth once daily in the morning. Cedric Ahumada MD 3/27/2025 Morning Active   cholecalciferol (Vitamin D-3) 5,000 Units tablet [id number] No Take 1 tablet (5,000 Units) by mouth early in the morning.. Cedric Ahumada MD 3/27/2025 Morning Active   cyanocobalamin (Vitamin B-12) 100 mcg tablet [id number] No Take 1 tablet (100 mcg) by mouth once daily in the morning. Cedric Ahumada MD 3/27/2025 Morning Active   diclofenac sodium (Voltaren Arthritis Pain) 1 % gel [id number] No Apply 4.5 inches (4 g) topically 2 times a day as needed. Historical Provider, MD Unknown Active   famotidine (Pepcid) 40 mg tablet [id number]  TAKE 1 TABLET (40 MG) BY MOUTH ONCE DAILY AT BEDTIME. Jesus Staley MD   Active   medical cannabis [id number] No Take 1 each by mouth once daily at bedtime. Historical Provider, MD 3/27/2025 Active   metoprolol succinate XL (Toprol-XL) 25 mg 24 hr tablet [id number]  Take 1 tablet (25 mg) by mouth once daily. Do not crush or chew. Brayden Rutledge MD  Active   pravastatin (Pravachol) 10 mg tablet [id number] No TAKE 1 TABLET BY MOUTH AT BEDTIME Page Lopez MD 3/27/2025 Evening Active   venlafaxine XR (Effexor-XR) 150 mg 24 hr capsule [id number] No Take 1 capsule (150 mg) by mouth once daily in the morning. With 37.5 tablet  every morning Cedric Ahumada MD 3/28/2025 Morning Active   venlafaxine XR (Effexor-XR) 37.5 mg 24 hr capsule [id number] No TAKE ONE ALONG WITH 150 MG DAILY Historical Provider, MD 3/27/2025 Morning Active   vitamin E mixed 400 unit capsule [id number] No Take 1 capsule by mouth once daily in the morning. Historical Provider, MD 3/27/2025 Morning Active                  RX Allergies[3]  Social History     Socioeconomic History    Marital status:      Spouse name: Not on file    Number of children: Not on file    Years of education: Not on file    Highest education level: Not on file   Occupational History    Not on file   Tobacco Use    Smoking status: Never     Passive exposure: Past    Smokeless tobacco: Never   Vaping Use    Vaping status: Never Used   Substance and Sexual Activity    Alcohol use: Not Currently     Alcohol/week: 0.0 - 1.0 standard drinks of alcohol     Comment: one beer a month    Drug use: Yes     Types: Marijuana    Sexual activity: Defer   Other Topics Concern    Not on file   Social History Narrative    Not on file     Social Drivers of Health     Financial Resource Strain: Low Risk  (3/28/2025)    Overall Financial Resource Strain (CARDIA)     Difficulty of Paying Living Expenses: Not hard at all   Food Insecurity: No Food Insecurity (3/28/2025)    Hunger Vital Sign     Worried About Running Out of Food in the Last Year: Never true      Ran Out of Food in the Last Year: Never true   Transportation Needs: No Transportation Needs (3/28/2025)    PRAPARE - Transportation     Lack of Transportation (Medical): No     Lack of Transportation (Non-Medical): No   Physical Activity: Not on file   Stress: No Stress Concern Present (3/28/2025)    [employer] - Occupational Stress Questionnaire     Feeling of Stress : Only a little   Social Connections: Not on file   Intimate Partner Violence: Not At Risk (3/28/2025)    Humiliation, Afraid, Rape, and Kick questionnaire     Fear of Current or Ex-Partner: No     Emotionally Abused: No     Physically Abused: No     Sexually Abused: No   Housing Stability: Low Risk  (3/28/2025)    Housing Stability Vital Sign     Unable to Pay for Housing in the Last Year: No     Number of Times Moved in the Last Year: 0     Homeless in the Last Year: No     Surgical History[4]      Physical Exam  General: Well-appearing female in no acute distress. Awake, alert and oriented. Pleasant and cooperative.  Respiratory: Non-labored breathing  Mood: Euthymic   Gait: Moderately antalgic  Assistive Device: {amb assistive device:83706}  Skin: warm, dry and intact without lesions    Affected side: left knee:  Tenderness to palpation over the medial joint line.  Effusion: ***  ROM: 10 - 110  Limb Alignment: Varus  Stable to varus and valgus stress at full extension and 30 degrees of flexion  Quad Strength: 5/5  Hamstring Strength: 5/5  Patella Crepitus: {YES,NO:04253}  Patella Grind Test: {YES,NO:56266}  Sensation: Intact to light touch distally  Motor function: Able to fire TA, EHL, G/S  Pulses: Palpable DP pulse    Imaging  Imaging-4 view xrays of the left knee were independently reviewed and interpreted in clinic today. The findings were reviewed with the patient. There are severe degenerative changes of the left knee with varus limb alignment. There is Advanced medial and lateral joint space narrowing with  underlying subchondral sclerosis, and osteophyte formation. No evidence of fracture, AVN, dislocation, osteomyelitis.         Impression/Plan  Roseanne Lira is a 74 y.o. year-old female with Severe OA of the side: left knee. We reviewed an evidence-based approach to osteoarthritis of the knee. We discussed in detail a treatment plan that She is comfortable with.    I had an in-depth discussion about the nature and natural history of degenerative joint disease. I discussed the etiology of symptoms. I explained that it is progressive, but there is no way to predict how quickly a patient's symptoms will get worse. I discussed non-operative treatments for the patient's osteoarthritis in detail and briefly discussed indications for surgery. I advised that the patient that they can manage their pain symptomatically, with 3-5 day courses of nonsteroidal anti-inflammatories discussed their risks and need for regular monitoring for side effects of these medications. I suggested activity modification as needed when there is a a flare up. I explained to them that the best interventions she can take to perhaps slow the progression of the degenerative changes in the long-term are maintaining a healthy weight and avoiding joint loading activities and perform low impact exercise such as cycling, walking, and swimming. The use of a walking stick, cane or other assistive device can help as well.      I also discussed more invasive treatment options including injections and radiofrequency nerve ablation. I talked about the risks and benefits of injections, focusing on the fact that there is no way to predict the degree or duration of symptom relief, but that it can provide weeks to months of pain relief in most patients. Should these measures fail, the most invasive option would be joint replacement surgery. The patient should try and delay joint replacement surgery for as long as possible. If the patients' joint problem affects  their quality of life and cause significant restrictions of their activities, they may want to consider joint replacement surgery. I briefly covered the risks, benefits and expected recovery after total joint arthroplasty.    Roseanne Lira may one day benefit from an elective total joint replacement however has not yet exhausted other treatment options. I do not see a clear indication to move forward with arthroplasty. I have recommended the following and the patient is in agreement with the plan.  ***    All of the patient's questions were answered and she was comfortable with this plan of care.  Roseanne Lira was encouraged to call if any problems, questions or concerns arise.     Follow-up {kwtime:46902}    JONATHON Ponce, AUTUMN  Orthopaedic Physician Assistant  Division of Adult Reconstruction  Department of Orthopaedics  [place] [address]  Phone: [phone]  Fax: [phone]       [1]   Past Medical History:  Diagnosis Date    Age-related nuclear cataract, left eye 03/25/2017    Age-related nuclear cataract of left eye    Age-related nuclear cataract, right eye 03/25/2017    Age-related nuclear cataract of right eye    Allergy status to unspecified drugs, medicaments and biological substances     History of seasonal allergies    Arthritis     Asthma     BRCA1 negative     BRCA2 negative     Breast cancer july2011    Breast cancer in situ     COPD (chronic obstructive pulmonary disease) (Multi)     Cortical age-related cataract, left eye 03/25/2017    Cortical age-related cataract of left eye    Cortical age-related cataract, right eye 03/25/2017    Cortical age-related cataract of right eye    Depression     Diarrhea, unspecified 09/25/2018    Acute diarrhea    Dizziness and giddiness 06/10/2020    Postural dizziness with presyncope    Elevated blood-pressure reading, without diagnosis of hypertension 10/16/2019    Prehypertension     Encounter for general adult medical examination without abnormal findings 09/14/2020    Preventative health care    Encounter for other preprocedural examination     Preoperative exam for gynecologic surgery    Encounter for screening mammogram for malignant neoplasm of breast 06/03/2013    Visit for screening mammogram    Endometrial cancer (Multi) August 2023    Epigastric pain 01/24/2020    Abdominal pain, acute, epigastric    Fibromyalgia, primary     GERD (gastroesophageal reflux disease)     Headache, unspecified 10/19/2020    Sinus headache    Hyperlipidemia     Hypertension     Intraductal carcinoma in situ of unspecified breast 04/01/2015    DCIS (ductal carcinoma in situ) of breast    Joint pain     Leukoplakia of vulva     Lichen sclerosus of female genitalia    Long term (current) use of systemic steroids 08/27/2019    Current chronic use of systemic steroids    Malignant neoplasm of vertebral column (Multi) 09/09/2024    Meningioma (Multi)     Nephrolithiasis     Other chest pain 10/16/2019    Atypical chest pain    Other conditions influencing health status 10/25/2019    Chronic use of steroids    Other disturbances of skin sensation 05/19/2015    Burning sensation    Other malaise 08/23/2018    Malaise and fatigue    Other pericardial effusion (noninflammatory) ([facility]) 04/07/2015    Pericardial effusion    Personal history of irradiation August 2011    Personal history of malignant neoplasm of bone 07/11/2019    History of malignant neoplasm of bone    Personal history of other diseases of the respiratory system 04/01/2015    History of asthma    Personal history of other diseases of the respiratory system 04/05/2017    History of acute bronchitis    Personal history of other diseases of the respiratory system 01/03/2014    History of upper respiratory infection    Personal history of other specified conditions 03/30/2015    History of abdominal pain    Personal history of urinary (tract)  infections 06/23/2015    History of urinary tract infection    Personal history of urinary (tract) infections 04/05/2017    History of acute cystitis    Polymyalgia rheumatica (Multi)     Unspecified visual disturbance 07/07/2020    Visual disturbances    Urinary tract infection, site not specified 05/13/2021    Acute UTI    Uterine cancer (Multi)    [2]   Patient Active Problem List  Diagnosis    Alopecia areata    Angioma    Dyslipidemia    Dyspepsia    Gastro-esophageal reflux disease without esophagitis    H/O meningioma of the brain    Hemangioma of skin and subcutaneous tissue    Hypoestrogenism    Infantile idiopathic scoliosis of thoracolumbar region    Intermittent asthma (HHS-HCC)    Labia minora agglutination    Labile hypertension    Left leg weakness    Lichen sclerosus    Lipoma of back    Meningioma (Multi)    Muscle spasm    Myopia with astigmatism and presbyopia    Melanocytic nevi of trunk    Numerous moles    Osteoarthritis of left knee    Osteopenia    Other melanin hyperpigmentation    Other seborrheic keratosis    Paresthesia    Fibromyalgia    PMR (polymyalgia rheumatica) (Multi)    Tenderness of left calf    Postural dizziness    Primary localized osteoarthrosis of right lower leg    Rash and other nonspecific skin eruption    Reactive airway disease (HHS-HCC)    Seborrhea capitis    SOB (shortness of breath) on exertion    Kidney stone    Urolithiasis    Vitamin D deficiency    Vulvar pruritus    Weakness of both upper extremities    Age related osteoporosis    Allergic contact dermatitis due to cosmetics    Ankle edema    Asymptomatic varicose veins    Autonomic dysfunction    Bilateral carpal tunnel syndrome    Borderline hyperglycemia    Claustrophobia    Abnormal carotid ultrasound    Abnormal CT scan, lung    Acquired scoliosis    Acquired vaginal adhesions    Intertrigo    Lentigines    Visit for screening mammogram    Ductal carcinoma in situ (DCIS) of right breast    Chronic  bilateral low back pain without sciatica    Wellness examination    Bilateral hip pain    Depression, recurrent (CMS-HCC)    Aneurysm of vertebral artery (CMS-HCC)    CKD stage 3a, GFR 45-59 ml/min (Multi)    Lumbar radiculitis    Vision changes    Right foot pain    Knee pain, bilateral    Chronic bilateral thoracic back pain    Unilateral primary osteoarthritis, left knee    Arteritis, unspecified (CMS-HCC)    Chronic pain syndrome    Atrial fibrillation with RVR (Multi)   [3]   Allergies  Allergen Reactions    Lovastatin GI Upset    Codeine Nausea Only and Nausea/vomiting    Beclomethasone Unknown    Omeprazole Dermatitis    Other Itching     Hair dye    Hydrocortisone Unknown     (+) on allergy test    Penicillins Itching   [4]   Past Surgical History:  Procedure Laterality Date    BRAIN SURGERY      BREAST BIOPSY  July 2011    BREAST LUMPECTOMY Right     with radiation in 2011    BREAST SURGERY  06/03/2013    Breast Surgery    CATARACT EXTRACTION      COLONOSCOPY  06/03/2013    Complete Colonoscopy    CT ANGIO NECK  12/09/2022    CT NECK ANGIO W AND WO IV CONTRAST 12/9/2022 [facility]    CT HEAD ANGIO W AND WO IV CONTRAST  12/09/2022    CT HEAD ANGIO W AND WO IV CONTRAST 12/9/2022 [facility]    ENDOMETRIAL ABLATION      HYSTERECTOMY      MR HEAD ANGIO WO IV CONTRAST  07/27/2020    MR HEAD ANGIO WO IV CONTRAST 7/27/2020 [facility]CILL[facility] LEGAC[facility]    MR HEAD ANGIO WO IV CONTRAST  05/18/2017    MR HEAD ANGIO WO IV CONTRAST 5/18/2017 [facility] EMERGENCY LEGACY    MR NECK ANGIO WO IV CONTRAST  07/27/2020    MR NECK ANGIO WO IV CONTRAST 7/27/2020 [facility] ANCILLARY LEGACY    MR NECK ANGIO WO IV CONTRAST  05/18/2017    MR NECK ANGIO WO IV CONTRAST 5/18/2017 [facility] EMERGENCY LEGACY    OOPHORECTOMY  August 2023    OTHER SURGICAL HISTORY  06/03/2013    Craniotomy Supratentorial Excision Of Meningioma    OTHER SURGICAL HISTORY  10/19/2020    Cataract surgery    OTHER SURGICAL HISTORY  03/13/2017    Arthrodesis Cervical  c2-5 fusion    OTHER SURGICAL HISTORY  03/13/2017    Craniotomy Infratentorial Excision Of Meningioma    SPINE SURGERY  06/03/2013    Spine Repair fused for chordoma 1968    TUBAL LIGATION  05/19/2015    Tubal Ligation         MR NECK ANGIO WO IV CONTRAST  07/27/2020    MR NECK ANGIO WO IV CONTRAST 7/27/2020 [facility]CILLARY LEGAC[facility]    MR NECK ANGIO WO IV CONTRAST  05/18/2017    MR NECK ANGIO WO IV CONTRAST 5/18/2017 [facility] EMERGENCY LEGACY    OOPHORECTOMY  August 2023    OTHER SURGICAL HISTORY  06/03/2013    Craniotomy Supratentorial Excision Of Meningioma    OTHER SURGICAL HISTORY  10/19/2020    Cataract surgery    OTHER SURGICAL HISTORY  03/13/2017    Arthrodesis Cervical c2-5 fusion    OTHER SURGICAL HISTORY  03/13/2017    Craniotomy Infratentorial Excision Of Meningioma    SPINE SURGERY  06/03/2013    Spine Repair fused for chordoma 1968    TUBAL LIGATION  05/19/2015    Tubal Ligation

## 2025-04-30 ENCOUNTER — OFFICE VISIT (OUTPATIENT)
Dept: CARDIOLOGY | Facility: CLINIC | Age: 75
End: 2025-04-30
Payer: MEDICARE

## 2025-04-30 ENCOUNTER — HOSPITAL ENCOUNTER (OUTPATIENT)
Dept: CARDIOLOGY | Facility: CLINIC | Age: 75
Discharge: HOME | End: 2025-04-30
Payer: MEDICARE

## 2025-04-30 VITALS
BODY MASS INDEX: 33.07 KG/M2 | HEART RATE: 61 BPM | WEIGHT: 193.7 LBS | SYSTOLIC BLOOD PRESSURE: 118 MMHG | OXYGEN SATURATION: 97 % | DIASTOLIC BLOOD PRESSURE: 81 MMHG | HEIGHT: 64 IN

## 2025-04-30 DIAGNOSIS — I48.91 ATRIAL FIBRILLATION WITH RVR (MULTI): ICD-10-CM

## 2025-04-30 DIAGNOSIS — I48.91 ATRIAL FIBRILLATION, UNSPECIFIED TYPE (MULTI): ICD-10-CM

## 2025-04-30 DIAGNOSIS — I48.91 ATRIAL FIBRILLATION WITH RVR (MULTI): Primary | ICD-10-CM

## 2025-04-30 LAB — BODY SURFACE AREA: 1.99 M2

## 2025-04-30 PROCEDURE — 3079F DIAST BP 80-89 MM HG: CPT | Performed by: INTERNAL MEDICINE

## 2025-04-30 PROCEDURE — 1123F ACP DISCUSS/DSCN MKR DOCD: CPT | Performed by: INTERNAL MEDICINE

## 2025-04-30 PROCEDURE — 1126F AMNT PAIN NOTED NONE PRSNT: CPT | Performed by: INTERNAL MEDICINE

## 2025-04-30 PROCEDURE — 3074F SYST BP LT 130 MM HG: CPT | Performed by: INTERNAL MEDICINE

## 2025-04-30 PROCEDURE — 1036F TOBACCO NON-USER: CPT | Performed by: INTERNAL MEDICINE

## 2025-04-30 PROCEDURE — 99214 OFFICE O/P EST MOD 30 MIN: CPT | Performed by: INTERNAL MEDICINE

## 2025-04-30 PROCEDURE — 93247 EXT ECG>7D<15D SCAN A/R: CPT

## 2025-04-30 PROCEDURE — 93005 ELECTROCARDIOGRAM TRACING: CPT | Performed by: INTERNAL MEDICINE

## 2025-04-30 PROCEDURE — 99212 OFFICE O/P EST SF 10 MIN: CPT | Performed by: INTERNAL MEDICINE

## 2025-04-30 PROCEDURE — G2211 COMPLEX E/M VISIT ADD ON: HCPCS | Performed by: INTERNAL MEDICINE

## 2025-04-30 PROCEDURE — 1160F RVW MEDS BY RX/DR IN RCRD: CPT | Performed by: INTERNAL MEDICINE

## 2025-04-30 PROCEDURE — 1159F MED LIST DOCD IN RCRD: CPT | Performed by: INTERNAL MEDICINE

## 2025-04-30 ASSESSMENT — PATIENT HEALTH QUESTIONNAIRE - PHQ9
1. LITTLE INTEREST OR PLEASURE IN DOING THINGS: NOT AT ALL
SUM OF ALL RESPONSES TO PHQ9 QUESTIONS 1 AND 2: 1
10. IF YOU CHECKED OFF ANY PROBLEMS, HOW DIFFICULT HAVE THESE PROBLEMS MADE IT FOR YOU TO DO YOUR WORK, TAKE CARE OF THINGS AT HOME, OR GET ALONG WITH OTHER PEOPLE: NOT DIFFICULT AT ALL
2. FEELING DOWN, DEPRESSED OR HOPELESS: SEVERAL DAYS

## 2025-04-30 ASSESSMENT — ENCOUNTER SYMPTOMS
LOSS OF SENSATION IN FEET: 0
DEPRESSION: 1
OCCASIONAL FEELINGS OF UNSTEADINESS: 0

## 2025-04-30 ASSESSMENT — PAIN SCALES - GENERAL: PAINLEVEL_OUTOF10: 0-NO PAIN

## 2025-04-30 NOTE — PROGRESS NOTES
[facility] Physician: Page Lopez MD  Date of Visit: 04/30/2025  1:40 PM EDT  Location of visit: [facility] [place] [address]     Chief Complaint:   Chief Complaint   Patient presents with    New Patient Visit   New cardiology visit with a chief complaint of recently diagnosed atrial fibrillation, palpitations.  Recent labs were reviewed     HPI / Summary:   Roseanne Lira is a 74 y.o. female presents for new cardiovascular evaluation. Page Lopez MD   Here to establish care for evaluation and management of a diagnosis of atrial fibrillation  March 29 echocardiogram EF of 74%, outflow tract provokable gradient of 25 mmHg, grade 1 diastolic filling abnormality, no other significant valvular pathology normal RV function normal estimated RVSP    Hospitalized with A-fib with RVR in March spontaneously reverted and discharged  Developed at a preop class  Left knee replacement    She grew up [place], went to [employer].  No tobacco, rare ETOH, medical marijuana 5 mg THC.     2 kids.  Grandkids 4  [facility].    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     2 years ago uterine ca. Stage 1.5 B, TAHBSO  Genetic testing was negative.  FMR x 30 years  PMR just flalred, follows with rheum    Had both TA biopsied  HA 4-6x /day    Specialty Problems          Cardiology Problems    Abnormal carotid ultrasound    Asymptomatic varicose veins    Dyslipidemia    Labile hypertension    SOB (shortness of breath) on exertion    Atrial fibrillation with RVR (Multi)        Medical History[1]       Surgical History[2]       Social History:   reports that she has never smoked. She has been exposed to tobacco smoke. She has never used smokeless tobacco. She reports that she does not currently use alcohol. She reports current drug  "use. Drug: Marijuana.      Allergies:  RX Allergies[3]    Outpatient Medications:  Current Outpatient Medications   Medication Instructions    acetaminophen (TYLENOL) 1,000 mg, oral, Every 6 hours PRN    amLODIPine (NORVASC) 2.5 mg, oral, 2 times daily    apixaban (ELIQUIS) 5 mg, oral, 2 times daily, For a fib    calcium carbonate-vitamin D3 600 mg-20 mcg (800 unit) tablet 1 tablet, Every morning    cetirizine (ZyrTEC) 10 mg tablet 1 tablet, Every morning    cholecalciferol (VITAMIN D-3) 5,000 Units, Daily    clotrimazole-betamethasone (Lotrisone) cream 1 Application, Topical, 2 times daily PRN, Apply to affected are twice daily as needed    cyanocobalamin (Vitamin B-12) 100 mcg tablet 1 tablet, Every morning    diclofenac sodium (VOLTAREN ARTHRITIS PAIN) 4 g, 2 times daily PRN    famotidine (PEPCID) 40 mg, oral, Nightly    medical cannabis 1 each, Nightly    metoprolol succinate XL (TOPROL-XL) 25 mg, oral, Daily, Do not crush or chew.    pravastatin (Pravachol) 10 mg tablet TAKE 1 TABLET BY MOUTH AT BEDTIME    venlafaxine XR (EFFEXOR-XR) 37.5 mg, Daily    venlafaxine XR (EFFEXOR-XR) 150 mg, Every morning    vitamin E mixed 400 unit capsule 1 capsule, Every morning       ROS     Physical Exam:  Vitals:    04/30/25 1341   BP: 118/81   BP Location: Right arm   Patient Position: Sitting   BP Cuff Size: Large adult   Pulse: 61   SpO2: 97%   Weight: 87.9 kg (193 lb 11.2 oz)   Height: 1.626 m (5' 4\")     Wt Readings from Last 5 Encounters:   04/30/25 87.9 kg (193 lb 11.2 oz)   04/21/25 88 kg (194 lb)   04/11/25 88 kg (194 lb)   03/28/25 87.1 kg (192 lb)   01/27/25 87.1 kg (192 lb)     Body mass index is 33.25 kg/m².   Physical Exam   Female in no acute distress.  Flat JVP.  Normal carotid upstrokes.  Regular rhythm without gallop.  Clear lungs.  Soft abdomen.  No dependent edema with intact pedal pulses  Last Labs:  CMP:  Recent Labs     03/29/25  0501 03/28/25  1458 10/16/24  1421 07/09/24  1106 03/27/24  1046    " 140 139 140 141   K 3.7 3.7 4.5 4.1 4.2    107 100 101 99   CO2 25 23 28 20* 29   ANIONGAP 14 14 16 23* 17   BUN 15 18 24* 18 24*   CREATININE 0.81 0.74 1.17* 1.07* 1.00   EGFR 76 84 49* 55* 59*   GLUCOSE 107* 80 79 101* 90     Recent Labs     10/16/24  1421 07/09/24  1106 03/27/24  1046 08/17/22  1109 04/30/21  1417 10/15/20  1023 01/24/20  1351   ALBUMIN 4.6 4.6 4.6 4.4 4.6   < > 4.6   ALKPHOS 63 60 63 57 68   < > 72   ALT 10 14 12 10 11   < > 19   AST 14 16 14 12 17   < > 15   BILITOT 0.5 0.5 0.6 0.5 0.5   < > 0.3   LIPASE  --   --   --   --   --   --  19    < > = values in this interval not displayed.     CBC:  Recent Labs     03/29/25  0501 03/28/25  1344 10/16/24  1421 03/27/24  1046 08/18/23  2320   WBC 9.6 9.6 8.2 7.5 19.0*   HGB 13.8 13.9 13.7 14.2 13.4   HCT 42.5 41.8 43.3 44.6 40.3    404 419 437 418   MCV 83 83 86 86 82     COAG:   Recent Labs     03/28/25  1344   INR 1.1     HEME/ENDO:  Recent Labs     03/28/25  1344 03/27/24  1046 08/17/22  1109 08/02/21  1048 10/15/20  1023   IRONSAT  --   --   --  25  --    TSH 1.03 1.13 0.91 0.92 0.92   HGBA1C  --  5.4 5.5 5.7 5.5      CARDIAC:   Recent Labs     03/28/25  1458 03/28/25  1344   TROPHS 6 4   BNP  --  193*     Recent Labs     07/09/24  1106 03/27/24  1046 06/21/22  1255 08/02/21  1048 10/15/20  1023   CHOL 242* 284* 216* 258* 242*   LDLF  --   --  116* 161* 142*   HDL 63.0 69.7 64.0 58.9 61.4   TRIG 178* 176* 178* 190* 192*       Last Cardiology Tests:  ECG:      Echo:  Echo Results:  Transthoracic Echo (TTE) Complete 03/29/2025    [facility], [address] [place] [address]  Tel [phone] and Fax [phone]    TRANSTHORACIC ECHOCARDIOGRAM REPORT      Patient Name:       MANSI Mathis Physician:    [id number] Alex Amaral MD  Study Date:         3/29/2025           Ordering Provider:    [id number] WAGNER KARIMI  MRN/PID:            [id number]            Fellow:  Accession#:          [id number]        Nurse:  Date of Birth/Age:  1950 / 74      Sonographer:          Angely cam                                     PAGE  Gender assigned at  F                   Additional Staff:  Birth:  Height:             163.00 cm           Admit Date:           3/29/2025  Weight:             87.00 kg            Admission Status:     Inpatient -  Routine  BSA / BMI:          1.93 m2 / 32.74     Encounter#:           [id number]  kg/m2  Blood Pressure:     153/79 mmHg         Department Location:  [facility]    Study Type:    TRANSTHORACIC ECHO (TTE) COMPLETE  Diagnosis/ICD: Abnormal electrocardiogram [ECG] [EKG]-R94.31; Unspecified atrial  fibrillation-I48.91  Indication:    abnormal Ekg, A-fib  CPT Code:      Echo Complete w Full Doppler-37436    Patient History:  Pertinent History: A-Fib. SOB.    Study Detail: The following Echo studies were performed: 2D, M-Mode, Doppler and  color flow.      PHYSICIAN INTERPRETATION:  Left Ventricle: Left ventricular ejection fraction is normal, calculated by Pond's biplane at 74%. There is mild eccentric left ventricular hypertrophy. There are no regional left ventricular wall motion abnormalities. The left ventricular cavity size is normal. There is mildly increased septal and mildly increased posterior left ventricular wall thickness. Spectral Doppler shows a Grade I (impaired relaxation pattern) of left ventricular diastolic filling with normal left atrial filling pressure. There is a mild left ventricular outflow tract obstruction with the Valsalva maneuver. The provoked gradient is 25 mmHg.  Left Atrium: The left atrial size is normal.  Right Ventricle: The right ventricle is normal in size. There is normal right ventricular global systolic function.  Right Atrium: The right atrium is normal in size.  Aortic Valve: The aortic valve is trileaflet. There is mild aortic valve cusp calcification. There is mild aortic valve thickening. There is evidence  of mildly elevated transaortic gradients consistent with sclerosis of the aortic valve. The aortic valve dimensionless index is 1.00. There is no evidence of aortic valve regurgitation. The peak instantaneous gradient of the aortic valve is 17 mmHg. The mean gradient of the aortic valve is 10 mmHg.  Mitral Valve: The mitral valve is normal in structure. There is mild mitral annular calcification. There is trace mitral valve regurgitation.  Tricuspid Valve: The tricuspid valve is structurally normal. There is trace tricuspid regurgitation. The Doppler estimated RVSP is slightly elevated at 31.2 mmHg. Reported right ventricular systolic pressure may be underestimated due to incomplete or suboptimal Doppler envelope.  Pulmonic Valve: The pulmonic valve is structurally normal. There is physiologic pulmonic valve regurgitation.  Pericardium: There is no pericardial effusion noted.  Aorta: The aortic root is normal.  Systemic Veins: The inferior vena cava appears normal in size, with IVC inspiratory collapse greater than 50%.  In comparison to the previous echocardiogram(s): Compared with study dated 5/7/2021, The LVEF appears to have increased on the current study.      CONCLUSIONS:  1. Left ventricular ejection fraction is normal, calculated by Pond's biplane at 74%.  2. Spectral Doppler shows a Grade I (impaired relaxation pattern) of left ventricular diastolic filling with normal left atrial filling pressure.  3. There is a mild left ventricular outflow tract obstruction with the Valsalva maneuver. The provoked gradient is 25 mmHg.  4. There is normal right ventricular global systolic function.  5. Slightly elevated right ventricular systolic pressure.  6. Aortic valve sclerosis.  7. Reported right ventricular systolic pressure may be underestimated due to incomplete or suboptimal Doppler envelope.    QUANTITATIVE DATA SUMMARY:    2D MEASUREMENTS:          Normal Ranges:  LAs:             4.10 cm   (2.7-4.0cm)  IVSd:            1.14 cm  (0.6-1.1cm)  LVPWd:           0.98 cm  (0.6-1.1cm)  LVIDd:           5.03 cm  (3.9-5.9cm)  LVIDs:           3.24 cm  LV Mass Index:   103 g/m2  LVEDV Index:     28 ml/m2  LV % FS          35.7 %      LEFT ATRIUM:                  Normal Ranges:  LA Vol A4C:        47.0 ml    (22+/-6mL/m2)  LA Vol A2C:        59.3 ml  LA Vol BP:         54.6 ml  LA Vol Index A4C:  24.4 ml/m2  LA Vol Index A2C:  30.8 ml/m2  LA Vol Index BP:   28.3 ml/m2  LA Area A4C:       17.9 cm2  LA Area A2C:       20.8 cm2  LA Major Axis A4C: 5.8 cm  LA Major Axis A2C: 6.2 cm  LA Volume Index:   28.2 ml/m2  LA Vol A4C:        48.0 ml  LA Vol A2C:        56.4 ml  LA Vol Index BSA:  27.1 ml/m2      RIGHT ATRIUM:          Normal Ranges:  RA Area A4C:  14.5 cm2      M-MODE MEASUREMENTS:         Normal Ranges:  Ao Root:             3.20 cm (2.0-3.7cm)  LAs:                 4.70 cm (2.7-4.0cm)      AORTA MEASUREMENTS:         Normal Ranges:  Ao Sinus, d:        3.00 cm (2.1-3.5cm)  Ao STJ, d:          3.40 cm (1.7-3.4cm)  Asc Ao, d:          3.20 cm (2.1-3.4cm)      LV SYSTOLIC FUNCTION:  Normal Ranges:  EF-A4C View:    78 % (>=55%)  EF-A2C View:    71 %  EF-Biplane:     74 %  LV EF Reported: 74 %      LV DIASTOLIC FUNCTION:             Normal Ranges:  MV Peak E:             0.63 m/s    (0.7-1.2 m/s)  MV Peak A:             0.88 m/s    (0.42-0.7 m/s)  E/A Ratio:             0.71        (1.0-2.2)  MV A Dur:              144.62 msec  PulmV Sys Seth:         38.15 cm/s  PulmV Toney Seth:        49.64 cm/s  PulmV S/D Seth:         0.77  PulmV A Revs Seth:      19.59 cm/s  PulmV A Revs Dur:      123.69 msec      MITRAL VALVE:          Normal Ranges:  MV DT:        196 msec (150-240msec)      AORTIC VALVE:                      Normal Ranges:  AoV Vmax:                2.06 m/s  (<=1.7m/s)  AoV Peak P.9 mmHg (<20mmHg)  AoV Mean P.8 mmHg  (1.7-11.5mmHg)  LVOT Max Seth:            1.90 m/s   (<=1.1m/s)  AoV VTI:                 43.33 cm  (18-25cm)  LVOT VTI:                43.26 cm  LVOT Diameter:           2.03 cm   (1.8-2.4cm)  AoV Area, VTI:           3.22 cm2  (2.5-5.5cm2)  AoV Area,Vmax:           2.99 cm2  (2.5-4.5cm2)  AoV Dimensionless Index: 1.00      RIGHT VENTRICLE:  RV Basal 2.90 cm  RV Mid   2.30 cm  RV Major 6.7 cm  TAPSE:   26.0 mm  RV s'    0.21 m/s      TRICUSPID VALVE/RVSP:          Normal Ranges:  Peak TR Velocity:     2.65 m/s  Est. RA Pressure:     3 mmHg  RV Syst Pressure:     31 mmHg  (< 30mmHg)  IVC Diam:             1.70 cm      PULMONIC VALVE:          Normal Ranges:  PV Accel Time:  86 msec  (>120ms)  PV Max Seth:     1.3 m/s  (0.6-0.9m/s)  PV Max P.4 mmHg      PULMONARY VEINS:  PulmV A Revs Dur: 123.69 msec  PulmV A Revs Seth: 19.59 cm/s  PulmV Toney Seth:   49.64 cm/s  PulmV S/D Seth:    0.77  PulmV Sys Seth:    38.15 cm/s      [id number] Alex Amaral MD  Electronically signed on 3/29/2025 at 12:51:43 PM        ** Final **       Cath:      Stress Test:  Stress Results:  No results found for this or any previous visit from the past 365 days.         Cardiac Imaging:  XR hip left with pelvis when performed 2 or 3 views  Narrative: Interpreted By:  Sarita Brunner,   STUDY:  XR HIP LEFT WITH PELVIS WHEN PERFORMED 2 OR 3 VIEWS;  2025 10:04  am      INDICATION:  Signs/Symptoms:PAIN.      COMPARISON:  2024      ACCESSION NUMBER(S):  [id number]      ORDERING CLINICIAN:  SHARLA LI      FINDINGS:  Degenerative changes are seen in bilateral SI joints and lower lumbar  spine.  Narrowing of bilateral hip joints is seen with associated  sclerosis and spur formation.      No acute fracture or dislocation is seen.      The pubic symphysis is not a diastased. No lytic or blastic lesions  are seen.          Impression:     Degenerative changes of bilateral hips.      Degenerative changes of bilateral SI joints and lower lumbar spine.      Signed by: Sarita Brunner 2025  8:40 PM  Dictation workstation:   [id number]  XR knee left 4+ views  Narrative: Interpreted By:  Sarita Brunner,   STUDY:  XR KNEE LEFT 4+ VIEWS;  4/28/2025 10:04 am      INDICATION:  Signs/Symptoms:PAIN.      COMPARISON:  06/27/2023      ACCESSION NUMBER(S):  [id number]      ORDERING CLINICIAN:  SHARLA LI      FINDINGS:  Spurs are seen off of the distal femur and proximal tibia, including  the tibial spines.  Spurs are also seen off of the patella. Prominent  narrowing of the medial compartment is seen. Mild lateral subluxation  of the patella is noted.      No acute fracture or dislocation is noted.      No periosteal reaction is seen.      There is no evidence of suprapatellar effusion.      Impression: Degenerative changes.          MACRO:  None      Signed by: Sarita Brunner 4/28/2025 8:39 PM  Dictation workstation:   [id number]        Assessment/Plan   74-year-old female with multiple medical issues recently hospitalized with transient A-fib noted in a class prior to an anticipated knee operation.  She has had no recurrence since late March on beta-blocker and tolerating Eliquis.  GCA is flaring at this time and is possible that could be a contributor to the development of the atrial arrhythmia since inflammation is strongly associated with A-fib.    GCA if needs steroids, needs PPI    Will do a 3-month follow-up and a 2-week cardiac monitor.  I have renewed her Eliquis prescription I will call her after monitor results as available.  Orders:  No orders of the defined types were placed in this encounter.     Followup Appts:  Future Appointments   Date Time Provider Department Center   5/14/2025 10:45 AM Zehra Garg MD [id number] [facility]   5/28/2025  9:00 AM Page Lopez MD [id number] [facility]   6/17/2025  1:00 PM Brooke Xie MD [id number] [facility]   7/14/2025  1:00 PM Nicole Wakefield APRN-CNP [id number] [facility]   10/20/2025  1:45 PM Anastacia Pack MD [id number] [facility]            ____________________________________________________________  Kimani Reid MD    Senior Attending Physician  [facility]  [facility]         [1]   Past Medical History:  Diagnosis Date    Age-related nuclear cataract, left eye 03/25/2017    Age-related nuclear cataract of left eye    Age-related nuclear cataract, right eye 03/25/2017    Age-related nuclear cataract of right eye    Allergy status to unspecified drugs, medicaments and biological substances     History of seasonal allergies    Arthritis     Asthma     BRCA1 negative     BRCA2 negative     Breast cancer [id number]    Breast cancer in situ     COPD (chronic obstructive pulmonary disease) (Multi)     Cortical age-related cataract, left eye 03/25/2017    Cortical age-related cataract of left eye    Cortical age-related cataract, right eye 03/25/2017    Cortical age-related cataract of right eye    Depression     Diarrhea, unspecified 09/25/2018    Acute diarrhea    Dizziness and giddiness 06/10/2020    Postural dizziness with presyncope    Elevated blood-pressure reading, without diagnosis of hypertension 10/16/2019    Prehypertension    Encounter for general adult medical examination without abnormal findings 09/14/2020    Preventative health care    Encounter for other preprocedural examination     Preoperative exam for gynecologic surgery    Encounter for screening mammogram for malignant neoplasm of breast 06/03/2013    Visit for screening mammogram    Endometrial cancer (Multi) August 2023    Epigastric pain 01/24/2020    Abdominal pain, acute, epigastric    Fibromyalgia, primary     GERD (gastroesophageal reflux disease)     Headache, unspecified 10/19/2020    Sinus headache    Hyperlipidemia     Hypertension     Intraductal carcinoma in situ of unspecified breast 04/01/2015    DCIS (ductal carcinoma in situ) of breast    Joint pain     Leukoplakia of vulva     Lichen sclerosus of female  genitalia    Long term (current) use of systemic steroids 08/27/2019    Current chronic use of systemic steroids    Malignant neoplasm of vertebral column (Multi) 09/09/2024    Meningioma (Multi)     Nephrolithiasis     Other chest pain 10/16/2019    Atypical chest pain    Other conditions influencing health status 10/25/2019    Chronic use of steroids    Other disturbances of skin sensation 05/19/2015    Burning sensation    Other malaise 08/23/2018    Malaise and fatigue    Other pericardial effusion (noninflammatory) ([facility]) 04/07/2015    Pericardial effusion    Personal history of irradiation August 2011    Personal history of malignant neoplasm of bone 07/11/2019    History of malignant neoplasm of bone    Personal history of other diseases of the respiratory system 04/01/2015    History of asthma    Personal history of other diseases of the respiratory system 04/05/2017    History of acute bronchitis    Personal history of other diseases of the respiratory system 01/03/2014    History of upper respiratory infection    Personal history of other specified conditions 03/30/2015    History of abdominal pain    Personal history of urinary (tract) infections 06/23/2015    History of urinary tract infection    Personal history of urinary (tract) infections 04/05/2017    History of acute cystitis    Polymyalgia rheumatica (Multi)     Unspecified visual disturbance 07/07/2020    Visual disturbances    Urinary tract infection, site not specified 05/13/2021    Acute UTI    Uterine cancer (Multi)    [2]   Past Surgical History:  Procedure Laterality Date    BRAIN SURGERY      BREAST BIOPSY  July 2011    BREAST LUMPECTOMY Right     with radiation in 2011    BREAST SURGERY  06/03/2013    Breast Surgery    CATARACT EXTRACTION      COLONOSCOPY  06/03/2013    Complete Colonoscopy    CT ANGIO NECK  12/09/2022    CT NECK ANGIO W AND WO IV CONTRAST 12/9/2022 [facility]    CT HEAD ANGIO W AND WO IV CONTRAST  12/09/2022     CT HEAD ANGIO W AND WO IV CONTRAST 12/9/2022 [facility]    ENDOMETRIAL ABLATION      HYSTERECTOMY      MR HEAD ANGIO WO IV CONTRAST  07/27/2020    MR HEAD ANGIO WO IV CONTRAST 7/27/2020 [facility]    MR HEAD ANGIO WO IV CONTRAST  05/18/2017    MR HEAD ANGIO WO IV CONTRAST 5/18/2017 [facility]    MR NECK ANGIO WO IV CONTRAST  07/27/2020    MR NECK ANGIO WO IV CONTRAST 7/27/2020 [facility]    MR NECK ANGIO WO IV CONTRAST  05/18/2017    MR NECK ANGIO WO IV CONTRAST 5/18/2017 [facility]    OOPHORECTOMY  August 2023    OTHER SURGICAL HISTORY  06/03/2013    Craniotomy Supratentorial Excision Of Meningioma    OTHER SURGICAL HISTORY  10/19/2020    Cataract surgery    OTHER SURGICAL HISTORY  03/13/2017    Arthrodesis Cervical c2-5 fusion    OTHER SURGICAL HISTORY  03/13/2017    Craniotomy Infratentorial Excision Of Meningioma    SPINE SURGERY  06/03/2013    Spine Repair fused for chordoma 1968    TUBAL LIGATION  05/19/2015    Tubal Ligation   [3]   Allergies  Allergen Reactions    Lovastatin GI Upset    Codeine Nausea Only and Nausea/vomiting    Beclomethasone Unknown    Omeprazole Dermatitis    Other Itching     Hair dye    Hydrocortisone Unknown     (+) on allergy test    Penicillins Itching

## 2025-05-01 ENCOUNTER — OFFICE VISIT (OUTPATIENT)
Dept: RHEUMATOLOGY | Facility: CLINIC | Age: 75
End: 2025-05-01
Payer: MEDICARE

## 2025-05-01 VITALS
DIASTOLIC BLOOD PRESSURE: 77 MMHG | WEIGHT: 194 LBS | BODY MASS INDEX: 33.3 KG/M2 | HEART RATE: 56 BPM | OXYGEN SATURATION: 96 % | SYSTOLIC BLOOD PRESSURE: 130 MMHG

## 2025-05-01 DIAGNOSIS — M35.3 PMR (POLYMYALGIA RHEUMATICA) (MULTI): ICD-10-CM

## 2025-05-01 PROCEDURE — 99214 OFFICE O/P EST MOD 30 MIN: CPT | Performed by: STUDENT IN AN ORGANIZED HEALTH CARE EDUCATION/TRAINING PROGRAM

## 2025-05-01 PROCEDURE — 3075F SYST BP GE 130 - 139MM HG: CPT | Performed by: STUDENT IN AN ORGANIZED HEALTH CARE EDUCATION/TRAINING PROGRAM

## 2025-05-01 PROCEDURE — G2211 COMPLEX E/M VISIT ADD ON: HCPCS | Performed by: STUDENT IN AN ORGANIZED HEALTH CARE EDUCATION/TRAINING PROGRAM

## 2025-05-01 PROCEDURE — 3078F DIAST BP <80 MM HG: CPT | Performed by: STUDENT IN AN ORGANIZED HEALTH CARE EDUCATION/TRAINING PROGRAM

## 2025-05-01 PROCEDURE — 1123F ACP DISCUSS/DSCN MKR DOCD: CPT | Performed by: STUDENT IN AN ORGANIZED HEALTH CARE EDUCATION/TRAINING PROGRAM

## 2025-05-01 PROCEDURE — 1159F MED LIST DOCD IN RCRD: CPT | Performed by: STUDENT IN AN ORGANIZED HEALTH CARE EDUCATION/TRAINING PROGRAM

## 2025-05-01 RX ORDER — PREDNISONE 5 MG/1
TABLET ORAL
Qty: 77 TABLET | Refills: 0 | Status: SHIPPED | OUTPATIENT
Start: 2025-05-01

## 2025-05-01 NOTE — PROGRESS NOTES
Subjective   Patient ID: Roseanne Lira is a 74 y.o. female who presents for No chief complaint on file..  HPI: Female with a history of polymyalgia rheumatica in remission; last saw Dr. 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  Due to bilateral temporal pain lasting a few seconds happening several times a day, feelings of worsening vision, I sent her to ophthalmology, started her on prednisone 60 mg daily, and arranged for bilateral temporal biopsy.  Ophthalmology saw her October 22, 2024, Dr Glynn Guerrero, and saw no signs of GCA ophthalmologically, Dr. Barton surgery did bilateral temporal artery biopsies October 23, 2024 that were negative; Pred 60 was started Oct 17 2024, but stopped when negative biopsies returned    5/2025  Has had pain in both legs since 4/2025; saw an ortho PA 4/2025, who did hip xrays which showed some OA    Pain is all day long. Sometimes it wakes her up.  L hip is worse than right     Historical labs: CCP neg, RF neg    7/2024 labs:  WBC normal, Hgb normal, platelets normal  Cr H to 1.0,  ALP/AST/ALT/albumin/protein normal     Infectious: 2024  Hep B Core total neg, Hep B S Ag neg, Hep C Ab neg,  T spot neg    Imaging: 10/16/2024-    Temporal US  Right Carotid: Extremely tortuous temporal artery noted. Vessel appears patent. Size and peak systoloc velocities are as follows:  Proximal- 2.4 mm, 53 cm/sec  Mid-1.6 mm, 82 cm/sec  Distal- 1.0 mm, 43 cm/sec.  Left Carotid: Extremely tortuous temporal artery noted. Vessel appears patent. Size and peak systoloc velocities are as follows:  Proximal- 1.5 mm, 76 cm/sec  Mid- 1.6 mm, 54 cm/sec  Distal- 1.6 mm, 70 cm/sec.      Rheum hx per patient:   -Dx around 2019  -Was put on pred 15 ,and brought her off over 11 months  -Around 2021, patient sxs were intense pain; she does not exactly remember where; She had pain in shoulders, coming down arms, legs also hurt  -Occasionally, patient has aches on both shoulders  -Her arms get very tried washing her hair,           Rheumatology specific review of systems  Regular joint pain, morning stiffness>30 min, fevers , chills, unintentional weight loss, rashes, alopecia, mouth sores, nasal ulcers, malar rash,  Raynauds, morning stiffness in back>30 min, dry eyes, dry mouth, blood clots, recurrent miscarriages, rheum fam hx, uveitis, blood or mucus in stool     Chronic pain specific review of systems   widespread pain , widespread tenderness, brain fog, depression/anxiety, migraines or tension headaches, IBS or heartburn symptoms, irritable or overactive bladder, pelvic pain ,TMJ pain,poor sleep, fatigue    Objective   Wt 88 kg (194 lb)   BMI 33.30 kg/m²       Physical Exam  Constitutional: Alert and in no acute distress. Well developed, well nourished  Head and Face: Head and face: Normal.    Cardiovascular: Heart rate and rhythm were normal, normal S1 and S2. No peripheral edema.   Pulmonary: No respiratory distress. Clear bilateral breath sounds.  Musculoskeletal: no synovitis througout, No TTP over temporal arteries,  able to abduct arms completely,no TTP or pain on thighs  Skin: Normal skin color and pigmentation, normal skin turgor, and no rash.    Psychiatric: Judgment and insight: Intact. Mood and affect: Normal.     Lab Results   Component Value Date    WBC 9.6 03/29/2025    HGB 13.8 03/29/2025    HCT 42.5 03/29/2025     03/29/2025    ALT 10 10/16/2024    AST 14 10/16/2024    CREATININE 0.81 03/29/2025          Lab Results   Component Value Date    SEDRATE 14 10/16/2024    CRP 1.81 (H) 10/16/2024   \\            There is currently no information documented on the homunculus. Go to the Rheumatology activity and complete the homunculus joint exam.      === 06/13/24 ===    DEXA BONE DENSITY    - Impression -  DEXA:  According to World Health Organization criteria,  classification is low bone mass (osteopenia)    Followup recommended in two years or sooner as clinically warranted.    All images and detailed analysis are available on the  Radiology  PACS.    MACRO:  None    Signed by: Ever Bains 6/13/2024 12:56 PM  Dictation workstation:   [id number]    Assessment/Plan:  #PMR  -I reviewed Dr. Antunez's records that patient brought with her on June 2024  -possible flare 5/2025; symptoms are not pelvic girdle, however, when in the past day when her shoulders  -Will trial a brief prednisone trial, and if it helps remarkably, will treat as PMR flare; per patient, her cardiologist has cleared her for prednisone  -Will get labs today  -Start prednisone 15 mg (3 tabs) every morning with food for 2 weeks, Go down to 12.5 mg (2.5 tabs) every morning with food for 2 weeks    -If steroids dont help dramatically, we will try approaching this from a conservative standpoint: pain meds, tylenol, physical therapy, acupuncture, rest; patient notes she also has fibromyalgia which can be a confounder  -will get labs troday  -Baseline ESR and CRP done 7/2024 when in remission: sed rate normal at 22, CRP elevated to 1.6  -Repeat labs done  October 2024, sed rate normal but CRP elevated to 1.8  -Patient counseled on symptoms of giant cell arteritis and instructed to go to the ED immediately ([facility] is preferable) if they get new vision changes or severe headache, they were instructed that they should tell the ED physician that their rheumatologist has told them that they may be at risk for giant cell arteritis and high dose steroids should be started while they are working it up.  -Has chronic vision changes  - Side effects of systemic steroids were discussed. These side effects come from the ACR patient hand out and are including but not limited to bruising, osteoporosis (or weakened bones), diabetes, infection, hypertension, weight gain, cataracts, glaucoma, and a bone disorder called avascular necrosis. We discussed that most side effects are related to the dose administered and duration of treatment, so the goal is to use it at the lowest effective dose for the shortest period of time necessary. Discussed that though prednisone rarely has a direct interaction with other medications, there is an increased risk of infection when combining prednisone with other immunosuppressive medications.  Additionally, discussed that when taking prednisone with NSAIDs , there is an increased risk of GI ulcers. Patient handout given. Patient understanding and accepting of risks.    #Concern for GCA, vision threatening- negative b/l biopsies 10/2024, pred stopped  -New temporal headaches occurring several times a day, left worse than right, with unchanged MRI, since July 2024.  Vision changes since the end of September 2024  -Ophthalmology saw her October 22, 2024, no active signs of GCA  -Bilateral tabs done October 23, 2024  -Hepatitis and TB serologies neg October 2024  -Spoke with reading vascular surgeon  about temporal artery ultrasound on October 17, 2024; had tortuous temporal arteries, and potentially a soft halo sign; he said that the  temporal artery ultrasound was not definitive for this patient  -was on high dose prednisone from 10/17/2024- end October 2024,  but received bilateral temporal artery biopsies October 23, 2024 that were negative, so high-dose prednisone was stopped  -Patient does have known atherosclerosis, since bilateral biopsies were negative, she can stop prednisone continue following up with neurology and ophthalmology about her headache and intermittent vision changes      #OA  -Sees orthopedics, has a right knee replacement and is pending a left knee replacement in 9/2025  -Sees orthospine for her osteoarthritis  -Requested a referral for acupuncture as this has helped in the past; referral placed June 2024 but patient counseled to call and see if her insurance covers it, and if not, how much her out-of-pocket cost will be    #Osteopenia  -on 6.2024 dexa- repeat 6.2026  -continue vit d 5000 units daily   -recommended eating extra ca in diet (patient with hx of kidney stones)  -walk as tolerated  #Preventative Health Recommendations for Primary Care Providers    Vaccine Recommendations:    From ACR 2022 vaccine recommendations:    For Rheumatic and musculoskeletal disease (RMD) patients aged greater than or equal to 65 years, and RMD patients aged >18 and <65 years who are on immunosuppressive medication, giving high-dose or adjuvanted influenza vaccination is conditionally  recommended over giving regular-dose influenza vaccination.    For patients with RMD aged <65 years who are on immunosuppressive medication, pneumococcal vaccination is strongly recommended.    For patients with RMD aged >18 years who are on immunosuppressive medication, administering the recombinant zoster vaccine is strongly recommended.    For patients with RMD aged >26 and <45 years who are on immunosuppressive medication and not previously vaccinated, vaccination against HPV is conditionally recommended.    Cardiovascular Recommendations:    Patients  with inflammatory diseases are at high risk for cardiovascular disease, and should be aggressively screened by primary care physicians and started on lipid-lowering medications when appropriate.    Bone Health Recommendations:    Patients on steroids should be on calcium and Vitamin D. Patients with inflammatory rheumatic diseases are higher risk for osteoporosis and should have baseline DEXA screening.     Cervical Cancer Screening Recommendations (adapted from ASCCP)  Cytology is recommended if younger than 30 years.  Co-testing is preferred, but cytology is acceptable if 30 years or older.  If using cytology alone, perform annual cervical cytology. If results of 3 consecutive cytology results are normal, perform cytology every 3 years.  If using co-testing, perform baseline co-test with cytology and HPV. If result of cytology is normal and HPV is negative, co-testing can be performed every 3 years.  Continue screening throughout lifetime (older than 65 years). Discontinue screening based on shared discussion regarding quality and duration of life rather than age.      Patient counseled to seek medical care if any new or worsening symptoms, urgently if needed.      Note will be sent to primary care doctor     Return to clinic in 3wk mo, sooner if needed    Dragon dictation software was used to dictate this note. Errors may have occurred during dictation that was not intended by the user.

## 2025-05-01 NOTE — PATIENT INSTRUCTIONS
Lets treat this like a polymyalgia flare, and see if you have dramatic improvement    Start prednisone 15 mg (3 tabs) every morning with food for 2 weeks, Go down to 12.5 mg (2.5 tabs) every morning with food for 2 weeks    Lets also get labs today    If steroids dont help dramatically, we will try approaching this from a conservative standpoint: pain meds, tylenol, physical therapy, acupuncture, rest,     Lets followup telemedicine or in person in 3 weeks

## 2025-05-05 DIAGNOSIS — Z01.818 PRE-OPERATIVE EXAM: ICD-10-CM

## 2025-05-05 DIAGNOSIS — M17.12 PRIMARY OSTEOARTHRITIS OF LEFT KNEE: ICD-10-CM

## 2025-05-14 ENCOUNTER — APPOINTMENT (OUTPATIENT)
Dept: CARDIOLOGY | Facility: CLINIC | Age: 75
End: 2025-05-14
Payer: MEDICARE

## 2025-05-21 ENCOUNTER — APPOINTMENT (OUTPATIENT)
Dept: RHEUMATOLOGY | Facility: CLINIC | Age: 75
End: 2025-05-21
Payer: MEDICARE

## 2025-05-21 VITALS
BODY MASS INDEX: 31.65 KG/M2 | HEIGHT: 65 IN | SYSTOLIC BLOOD PRESSURE: 146 MMHG | DIASTOLIC BLOOD PRESSURE: 70 MMHG | WEIGHT: 190 LBS

## 2025-05-21 DIAGNOSIS — M35.3 PMR (POLYMYALGIA RHEUMATICA) (MULTI): ICD-10-CM

## 2025-05-21 DIAGNOSIS — M79.18 MUSCULOSKELETAL PAIN: ICD-10-CM

## 2025-05-21 DIAGNOSIS — M15.0 PRIMARY OSTEOARTHRITIS INVOLVING MULTIPLE JOINTS: ICD-10-CM

## 2025-05-21 DIAGNOSIS — M79.7 FIBROMYALGIA: Primary | ICD-10-CM

## 2025-05-21 PROCEDURE — 99214 OFFICE O/P EST MOD 30 MIN: CPT | Performed by: STUDENT IN AN ORGANIZED HEALTH CARE EDUCATION/TRAINING PROGRAM

## 2025-05-21 PROCEDURE — 3077F SYST BP >= 140 MM HG: CPT | Performed by: STUDENT IN AN ORGANIZED HEALTH CARE EDUCATION/TRAINING PROGRAM

## 2025-05-21 PROCEDURE — 1159F MED LIST DOCD IN RCRD: CPT | Performed by: STUDENT IN AN ORGANIZED HEALTH CARE EDUCATION/TRAINING PROGRAM

## 2025-05-21 PROCEDURE — G2211 COMPLEX E/M VISIT ADD ON: HCPCS | Performed by: STUDENT IN AN ORGANIZED HEALTH CARE EDUCATION/TRAINING PROGRAM

## 2025-05-21 PROCEDURE — 3078F DIAST BP <80 MM HG: CPT | Performed by: STUDENT IN AN ORGANIZED HEALTH CARE EDUCATION/TRAINING PROGRAM

## 2025-05-22 ENCOUNTER — TELEPHONE (OUTPATIENT)
Dept: CARDIOLOGY | Facility: CLINIC | Age: 75
End: 2025-05-22

## 2025-05-22 DIAGNOSIS — E78.5 DYSLIPIDEMIA: Primary | ICD-10-CM

## 2025-05-22 DIAGNOSIS — E55.9 VITAMIN D DEFICIENCY, UNSPECIFIED: ICD-10-CM

## 2025-05-22 DIAGNOSIS — R73.9 HYPERGLYCEMIA: ICD-10-CM

## 2025-05-22 LAB — BODY SURFACE AREA: 1.99 M2

## 2025-05-22 NOTE — TELEPHONE ENCOUNTER
Result Communication    Resulted Orders   Holter Or Event Cardiac Monitor   Result Value Ref Range    BSA 1.99 m2    Narrative    Refer to scanned images       10:54 AM      Results were successfully communicated with the patient and they acknowledged their understanding.

## 2025-05-22 NOTE — TELEPHONE ENCOUNTER
----- Message from Kimani Reid sent at 5/22/2025 10:50 AM EDT -----  Let her know around 2 % afib burden . Nothing new to be done, just stay on current meds. AAD or ablation not necessary at this point. F/up as scheduled  ----- Message -----  From: Basil Vázquez MD  Sent: 5/22/2025  10:03 AM EDT  To: Kimani Reid MD

## 2025-05-24 LAB
25(OH)D3+25(OH)D2 SERPL-MCNC: 48 NG/ML (ref 30–100)
ALBUMIN SERPL-MCNC: 4.7 G/DL (ref 3.6–5.1)
ALP SERPL-CCNC: 51 U/L (ref 37–153)
ALT SERPL-CCNC: 11 U/L (ref 6–29)
ANION GAP SERPL CALCULATED.4IONS-SCNC: 9 MMOL/L (CALC) (ref 7–17)
AST SERPL-CCNC: 13 U/L (ref 10–35)
BASOPHILS # BLD AUTO: 89 CELLS/UL (ref 0–200)
BASOPHILS NFR BLD AUTO: 1.1 %
BILIRUB SERPL-MCNC: 0.5 MG/DL (ref 0.2–1.2)
BUN SERPL-MCNC: 22 MG/DL (ref 7–25)
CALCIUM SERPL-MCNC: 9.5 MG/DL (ref 8.6–10.4)
CHLORIDE SERPL-SCNC: 99 MMOL/L (ref 98–110)
CHOLEST SERPL-MCNC: 208 MG/DL
CHOLEST/HDLC SERPL: 2.4 (CALC)
CO2 SERPL-SCNC: 31 MMOL/L (ref 20–32)
CREAT SERPL-MCNC: 1 MG/DL (ref 0.6–1)
CRP SERPL-MCNC: NORMAL MG/L
EGFRCR SERPLBLD CKD-EPI 2021: 59 ML/MIN/1.73M2
EOSINOPHIL # BLD AUTO: 170 CELLS/UL (ref 15–500)
EOSINOPHIL NFR BLD AUTO: 2.1 %
ERYTHROCYTE [DISTWIDTH] IN BLOOD BY AUTOMATED COUNT: 14.4 % (ref 11–15)
ERYTHROCYTE [SEDIMENTATION RATE] IN BLOOD BY WESTERGREN METHOD: 2 MM/H
EST. AVERAGE GLUCOSE BLD GHB EST-MCNC: 120 MG/DL
EST. AVERAGE GLUCOSE BLD GHB EST-SCNC: 6.6 MMOL/L
GLUCOSE SERPL-MCNC: 92 MG/DL (ref 65–99)
HBA1C MFR BLD: 5.8 %
HCT VFR BLD AUTO: 46.6 % (ref 35–45)
HDLC SERPL-MCNC: 87 MG/DL
HGB BLD-MCNC: 14.6 G/DL (ref 11.7–15.5)
LDLC SERPL CALC-MCNC: 93 MG/DL (CALC)
LYMPHOCYTES # BLD AUTO: 1904 CELLS/UL (ref 850–3900)
LYMPHOCYTES NFR BLD AUTO: 23.5 %
MCH RBC QN AUTO: 27.6 PG (ref 27–33)
MCHC RBC AUTO-ENTMCNC: 31.3 G/DL (ref 32–36)
MCV RBC AUTO: 88.1 FL (ref 80–100)
MONOCYTES # BLD AUTO: 664 CELLS/UL (ref 200–950)
MONOCYTES NFR BLD AUTO: 8.2 %
NEUTROPHILS # BLD AUTO: 5273 CELLS/UL (ref 1500–7800)
NEUTROPHILS NFR BLD AUTO: 65.1 %
NONHDLC SERPL-MCNC: 121 MG/DL (CALC)
PLATELET # BLD AUTO: 376 THOUSAND/UL (ref 140–400)
PMV BLD REES-ECKER: 10.1 FL (ref 7.5–12.5)
POTASSIUM SERPL-SCNC: 4.9 MMOL/L (ref 3.5–5.3)
PROT SERPL-MCNC: 7.1 G/DL (ref 6.1–8.1)
RBC # BLD AUTO: 5.29 MILLION/UL (ref 3.8–5.1)
SODIUM SERPL-SCNC: 139 MMOL/L (ref 135–146)
TRIGL SERPL-MCNC: 181 MG/DL
WBC # BLD AUTO: 8.1 THOUSAND/UL (ref 3.8–10.8)

## 2025-05-27 LAB
ALBUMIN SERPL-MCNC: 4.7 G/DL (ref 3.6–5.1)
ALP SERPL-CCNC: 51 U/L (ref 37–153)
ALT SERPL-CCNC: 11 U/L (ref 6–29)
ANION GAP SERPL CALCULATED.4IONS-SCNC: 9 MMOL/L (CALC) (ref 7–17)
AST SERPL-CCNC: 13 U/L (ref 10–35)
BASOPHILS # BLD AUTO: 89 CELLS/UL (ref 0–200)
BASOPHILS NFR BLD AUTO: 1.1 %
BILIRUB SERPL-MCNC: 0.5 MG/DL (ref 0.2–1.2)
BUN SERPL-MCNC: 22 MG/DL (ref 7–25)
CALCIUM SERPL-MCNC: 9.5 MG/DL (ref 8.6–10.4)
CHLORIDE SERPL-SCNC: 99 MMOL/L (ref 98–110)
CO2 SERPL-SCNC: 31 MMOL/L (ref 20–32)
CREAT SERPL-MCNC: 1 MG/DL (ref 0.6–1)
CRP SERPL-MCNC: 14.9 MG/L
EGFRCR SERPLBLD CKD-EPI 2021: 59 ML/MIN/1.73M2
EOSINOPHIL # BLD AUTO: 170 CELLS/UL (ref 15–500)
EOSINOPHIL NFR BLD AUTO: 2.1 %
ERYTHROCYTE [DISTWIDTH] IN BLOOD BY AUTOMATED COUNT: 14.4 % (ref 11–15)
ERYTHROCYTE [SEDIMENTATION RATE] IN BLOOD BY WESTERGREN METHOD: 2 MM/H
GLUCOSE SERPL-MCNC: 92 MG/DL (ref 65–99)
HCT VFR BLD AUTO: 46.6 % (ref 35–45)
HGB BLD-MCNC: 14.6 G/DL (ref 11.7–15.5)
LYMPHOCYTES # BLD AUTO: 1904 CELLS/UL (ref 850–3900)
LYMPHOCYTES NFR BLD AUTO: 23.5 %
MCH RBC QN AUTO: 27.6 PG (ref 27–33)
MCHC RBC AUTO-ENTMCNC: 31.3 G/DL (ref 32–36)
MCV RBC AUTO: 88.1 FL (ref 80–100)
MONOCYTES # BLD AUTO: 664 CELLS/UL (ref 200–950)
MONOCYTES NFR BLD AUTO: 8.2 %
NEUTROPHILS # BLD AUTO: 5273 CELLS/UL (ref 1500–7800)
NEUTROPHILS NFR BLD AUTO: 65.1 %
PLATELET # BLD AUTO: 376 THOUSAND/UL (ref 140–400)
PMV BLD REES-ECKER: 10.1 FL (ref 7.5–12.5)
POTASSIUM SERPL-SCNC: 4.9 MMOL/L (ref 3.5–5.3)
PROT SERPL-MCNC: 7.1 G/DL (ref 6.1–8.1)
RBC # BLD AUTO: 5.29 MILLION/UL (ref 3.8–5.1)
SODIUM SERPL-SCNC: 139 MMOL/L (ref 135–146)
WBC # BLD AUTO: 8.1 THOUSAND/UL (ref 3.8–10.8)

## 2025-05-28 ENCOUNTER — APPOINTMENT (OUTPATIENT)
Dept: PRIMARY CARE | Facility: CLINIC | Age: 75
End: 2025-05-28
Payer: MEDICARE

## 2025-05-28 VITALS
DIASTOLIC BLOOD PRESSURE: 83 MMHG | SYSTOLIC BLOOD PRESSURE: 128 MMHG | OXYGEN SATURATION: 95 % | BODY MASS INDEX: 32.65 KG/M2 | WEIGHT: 196 LBS | HEART RATE: 71 BPM | HEIGHT: 65 IN

## 2025-05-28 DIAGNOSIS — C54.1 MALIGNANT NEOPLASM OF ENDOMETRIUM (MULTI): ICD-10-CM

## 2025-05-28 DIAGNOSIS — Z00.00 ROUTINE GENERAL MEDICAL EXAMINATION AT HEALTH CARE FACILITY: Primary | ICD-10-CM

## 2025-05-28 DIAGNOSIS — M31.6 OTHER GIANT CELL ARTERITIS: ICD-10-CM

## 2025-05-28 DIAGNOSIS — N18.31 CKD STAGE 3A, GFR 45-59 ML/MIN (MULTI): ICD-10-CM

## 2025-05-28 DIAGNOSIS — E66.01 SEVERE OBESITY (MULTI): ICD-10-CM

## 2025-05-28 DIAGNOSIS — Z00.00 WELLNESS EXAMINATION: ICD-10-CM

## 2025-05-28 DIAGNOSIS — E78.5 DYSLIPIDEMIA: ICD-10-CM

## 2025-05-28 DIAGNOSIS — M06.09 RHEUMATOID ARTHRITIS WITHOUT RHEUMATOID FACTOR, MULTIPLE SITES (MULTI): ICD-10-CM

## 2025-05-28 DIAGNOSIS — J45.20 MILD INTERMITTENT ASTHMA WITHOUT COMPLICATION (HHS-HCC): ICD-10-CM

## 2025-05-28 DIAGNOSIS — F33.1 MAJOR DEPRESSIVE DISORDER, RECURRENT, MODERATE: ICD-10-CM

## 2025-05-28 DIAGNOSIS — R09.89 LABILE HYPERTENSION: ICD-10-CM

## 2025-05-28 DIAGNOSIS — M79.7 FIBROMYALGIA: ICD-10-CM

## 2025-05-28 DIAGNOSIS — C54.0 MALIGNANT NEOPLASM OF ISTHMUS UTERI (MULTI): ICD-10-CM

## 2025-05-28 DIAGNOSIS — D56.1 BETA THALASSEMIA (MULTI): ICD-10-CM

## 2025-05-28 PROCEDURE — 3079F DIAST BP 80-89 MM HG: CPT | Performed by: INTERNAL MEDICINE

## 2025-05-28 PROCEDURE — G0439 PPPS, SUBSEQ VISIT: HCPCS | Performed by: INTERNAL MEDICINE

## 2025-05-28 PROCEDURE — 1170F FXNL STATUS ASSESSED: CPT | Performed by: INTERNAL MEDICINE

## 2025-05-28 PROCEDURE — 1160F RVW MEDS BY RX/DR IN RCRD: CPT | Performed by: INTERNAL MEDICINE

## 2025-05-28 PROCEDURE — 99397 PER PM REEVAL EST PAT 65+ YR: CPT | Performed by: INTERNAL MEDICINE

## 2025-05-28 PROCEDURE — 1036F TOBACCO NON-USER: CPT | Performed by: INTERNAL MEDICINE

## 2025-05-28 PROCEDURE — 1159F MED LIST DOCD IN RCRD: CPT | Performed by: INTERNAL MEDICINE

## 2025-05-28 PROCEDURE — 3074F SYST BP LT 130 MM HG: CPT | Performed by: INTERNAL MEDICINE

## 2025-05-28 RX ORDER — ALBUTEROL SULFATE 90 UG/1
INHALANT RESPIRATORY (INHALATION)
Qty: 6.7 G | Refills: 1 | Status: SHIPPED | OUTPATIENT
Start: 2025-05-28

## 2025-05-28 ASSESSMENT — ENCOUNTER SYMPTOMS
SHORTNESS OF BREATH: 0
BLOOD IN STOOL: 0
CONSTIPATION: 0
SLEEP DISTURBANCE: 0
PALPITATIONS: 0
DECREASED CONCENTRATION: 0
LOSS OF SENSATION IN FEET: 0
OCCASIONAL FEELINGS OF UNSTEADINESS: 0
DEPRESSION: 0

## 2025-05-28 ASSESSMENT — PATIENT HEALTH QUESTIONNAIRE - PHQ9
SUM OF ALL RESPONSES TO PHQ9 QUESTIONS 1 AND 2: 0
SUM OF ALL RESPONSES TO PHQ9 QUESTIONS 1 AND 2: 0
2. FEELING DOWN, DEPRESSED OR HOPELESS: NOT AT ALL
2. FEELING DOWN, DEPRESSED OR HOPELESS: NOT AT ALL
1. LITTLE INTEREST OR PLEASURE IN DOING THINGS: NOT AT ALL
1. LITTLE INTEREST OR PLEASURE IN DOING THINGS: NOT AT ALL

## 2025-05-28 ASSESSMENT — ACTIVITIES OF DAILY LIVING (ADL)
MANAGING_FINANCES: INDEPENDENT
DRESSING: INDEPENDENT
GROCERY_SHOPPING: INDEPENDENT
BATHING: INDEPENDENT
TAKING_MEDICATION: INDEPENDENT
DOING_HOUSEWORK: INDEPENDENT

## 2025-05-28 NOTE — ASSESSMENT & PLAN NOTE
Seldom symptomatic  Orders:    albuterol (ProAir HFA) 90 mcg/actuation inhaler; 2 puffs every 8 hours PRN sob

## 2025-05-28 NOTE — PATIENT INSTRUCTIONS
Reduce your chocolate intake, consider seeing pain management, avoid oral anti inflammatories, re start 100 to 200 of magnesium glycinate, split metoprolol suc to take 1/2 in am and 1/2 in pm. Continue covid and flu vaccines . Also complete singrex vaccine. Returnn in 6 months.

## 2025-05-28 NOTE — ASSESSMENT & PLAN NOTE
Discussed diet, exercise, immunizations, and screening appropriate for their age.  Colonoscopy: no longer  Dexa: 2024,mild osteopenia  Mammogram: April 2025

## 2025-05-28 NOTE — PROGRESS NOTES
Subjective   Reason for Visit: Roseanne Lira is an 74 y.o. female here for a Medicare Wellness visit.     Past Medical, Surgical, and Family History reviewed and updated in chart.    Reviewed all medications by prescribing practitioner or clinical pharmacist (such as prescriptions, OTCs, herbal therapies and supplements) and documented in the medical record.    HPI She is endulging in sweets since she feels down, she has dicussed with her psychiatrist, considering changing venlfaxine. She used therapist but she was not effective  She is more sedentary due to knee pain  and left hip pain as below and she has gain pain, she has noticed more exertional dyspnea.  She continues taking betablockers and anticoagulation for A fib, holter showed paroxismal, infrequent. Cardiologist advised no further interventions.  Pt has seen rheumatologist for flare of left hip, posterior tight and groin, that did not respond to prednisone. More control with topical diflofenac. She was referred for this matter to pain management.   Her let knee replacement was postpone due to new onset of diagnosis of A fib. Now is schedule for September.  Review vascular consult as well.    Patient Care Team:  Page Lopez MD as PCP - General (Internal Medicine)  Page Loepz MD as PCP - [employer] PCP  Page Lopez MD as Primary Care Provider  Kaylin Donnelly RN as Registered Nurse (Orthopaedic Surgery)  Alex Amaral MD as Consulting Physician (Cardiology)     Review of Systems   Respiratory:  Negative for shortness of breath.    Cardiovascular:  Negative for palpitations.   Gastrointestinal:  Negative for blood in stool and constipation.        She stopped famotidine couple weeks and she feels again symptomatic   Neurological:         Bilateral dull ache in temporal areas, lasting seconds , no need for treatment   Psychiatric/Behavioral:  Negative for decreased concentration and sleep disturbance.    All  "other systems reviewed and are negative.      Objective   Vitals:  /83 (BP Location: Right arm, Patient Position: Sitting)   Pulse 71   Ht 1.65 m (5' 4.96\")   Wt 88.9 kg (196 lb)   SpO2 95%   BMI 32.66 kg/m²       Physical Exam  4 lb up from november  Eyes - conjunctivae clear, PERRLA  HEENT - no impacted wax  Neck - scar on right side, no cervical lymphadenopathy, no thyromegaly  Axilla - no palpable lymphadenopathy  Cardiac- regular rate and rhythm, no murmurs, no carotid bruit, no JVP  Lung - clear to auscultation, no rales, no rhonchi, no wheezing  GI - normally active bowel sounds, non tender, non distended, no hepatosplenomegaly, no rebound  MSK - non deformities, multiple soft tissue tender points  Extremities - no edema, good distal pulses  Neuro - non focal, oriented x 3  Skin - no bruises, no rashes, few moles  Psychiatric - pleasant, well groom, no hallucinations    Assessment & Plan  Routine general medical examination at health care facility    Orders:    1 Year Follow Up In Primary Care - Wellness Exam; Future    Labile hypertension  Split betablockers, take amlodipine as well bid.       Mild intermittent asthma without complication (HHS-HCC)  Seldom symptomatic  Orders:    albuterol (ProAir HFA) 90 mcg/actuation inhaler; 2 puffs every 8 hours PRN sob    Dyslipidemia  On goal       CKD stage 3a, GFR 45-59 ml/min (Multi)  Discussed hydration ,avoidance of nsaids  Orders:    Albumin-Creatinine Ratio, Urine Random; Future    Wellness examination  Discussed diet, exercise, immunizations, and screening appropriate for their age.  Colonoscopy: no longer  Dexa: 2024,mild osteopenia  Mammogram: April 2025         Fibromyalgia  Discussed acupunture, pain clinic, avoid nsaids oral                 "

## 2025-05-28 NOTE — ASSESSMENT & PLAN NOTE
Discussed hydration ,avoidance of nsaids  Orders:    Albumin-Creatinine Ratio, Urine Random; Future

## 2025-05-29 ENCOUNTER — APPOINTMENT (OUTPATIENT)
Dept: ORTHOPEDIC SURGERY | Facility: CLINIC | Age: 75
End: 2025-05-29
Payer: MEDICARE

## 2025-06-04 ENCOUNTER — APPOINTMENT (OUTPATIENT)
Dept: ORTHOPEDIC SURGERY | Facility: CLINIC | Age: 75
End: 2025-06-04
Payer: MEDICARE

## 2025-06-17 ENCOUNTER — APPOINTMENT (OUTPATIENT)
Dept: RHEUMATOLOGY | Facility: CLINIC | Age: 75
End: 2025-06-17
Payer: MEDICARE

## 2025-06-18 DIAGNOSIS — I10 BENIGN ESSENTIAL HYPERTENSION: ICD-10-CM

## 2025-06-18 RX ORDER — AMLODIPINE BESYLATE 2.5 MG/1
2.5 TABLET ORAL 2 TIMES DAILY
Qty: 180 TABLET | Refills: 1 | Status: SHIPPED | OUTPATIENT
Start: 2025-06-18

## 2025-06-24 ENCOUNTER — OFFICE VISIT (OUTPATIENT)
Dept: OBSTETRICS AND GYNECOLOGY | Facility: CLINIC | Age: 75
End: 2025-06-24
Payer: MEDICARE

## 2025-06-24 VITALS
BODY MASS INDEX: 33.39 KG/M2 | SYSTOLIC BLOOD PRESSURE: 121 MMHG | HEART RATE: 64 BPM | DIASTOLIC BLOOD PRESSURE: 72 MMHG | WEIGHT: 195.6 LBS | HEIGHT: 64 IN

## 2025-06-24 DIAGNOSIS — N95.2 VAGINAL ATROPHY: ICD-10-CM

## 2025-06-24 DIAGNOSIS — L90.0 LICHEN SCLEROSUS: Primary | ICD-10-CM

## 2025-06-24 PROCEDURE — 1125F AMNT PAIN NOTED PAIN PRSNT: CPT | Performed by: NURSE PRACTITIONER

## 2025-06-24 PROCEDURE — 99212 OFFICE O/P EST SF 10 MIN: CPT | Performed by: NURSE PRACTITIONER

## 2025-06-24 PROCEDURE — 1159F MED LIST DOCD IN RCRD: CPT | Performed by: NURSE PRACTITIONER

## 2025-06-24 PROCEDURE — 3074F SYST BP LT 130 MM HG: CPT | Performed by: NURSE PRACTITIONER

## 2025-06-24 PROCEDURE — 3078F DIAST BP <80 MM HG: CPT | Performed by: NURSE PRACTITIONER

## 2025-06-24 RX ORDER — CLOBETASOL PROPIONATE 0.5 MG/G
OINTMENT TOPICAL 2 TIMES DAILY
Qty: 60 G | Refills: 2 | Status: SHIPPED | OUTPATIENT
Start: 2025-06-24 | End: 2025-06-25 | Stop reason: SDUPTHER

## 2025-06-24 RX ORDER — ESTRADIOL 0.1 MG/G
1 CREAM VAGINAL 2 TIMES WEEKLY
Qty: 42.5 G | Refills: 3 | Status: SHIPPED | OUTPATIENT
Start: 2025-06-26 | End: 2025-06-26 | Stop reason: SDUPTHER

## 2025-06-24 ASSESSMENT — ENCOUNTER SYMPTOMS
DEPRESSION: 0
OCCASIONAL FEELINGS OF UNSTEADINESS: 0
LOSS OF SENSATION IN FEET: 0

## 2025-06-24 ASSESSMENT — PAIN SCALES - GENERAL: PAINLEVEL_OUTOF10: 3

## 2025-06-24 NOTE — PROGRESS NOTES
Roseanne Lira is a 74 y.o. female who presents for skin irritation over the vulva. She is scheduled for a knee replacement on .      History:  Vulvar Symptoms:   - Reports 40 year hx of LS.   - The patient mentions  clobetasol and has not been using estrogen cream.  - Currently experiencing a flare-up, described as an 'angry' appearance and causing irritation.  - She thinks she has a reaction to hydrocortisone cream.   - Patient goes to [facility], but they don't do vulvar checks.   - She also has some Triamcinolone at home.     Physical Exam  Constitutional:       Appearance: Normal appearance.   Genitourinary:      Genitourinary Comments: Fusion on her posterior fourchette from LS. Fusion on anterior fourchette from LS and skin is splitting there.     Neurological:      Mental Status: She is alert.           Assessment and Plan  74 y.o. female being assessed for LS. Patient has a hx of uterovaginal prolapse and MIKALA s/p prior MUS, LSC, and USLS on 2024. Comorbidities include: hx of Stage 1 endometrial cancer, history of breast cancer.     Diagnoses:   #1 LS  #2 Vaginal atrophy     Plan:   1. LS, vaginal atrophy   - Sent rx for Clobetasol; apply it BID x2 weeks and then reduce it to daily use. After that, use it PRN.   - Sent rx for tv estrogen cream; apply this 2x/week at night inside the vagina.      Follow-up in 2025 with Dr. Pack.     Scribe Attestation:   ICandis, am scribing for virtually, and in the presence of TIEN Churchill on 2025 at 5:32 PM.     SPEKE audio duration: 8 minutes    I spent a total of {eConsult Time:88877} in face to face and non face to face time.

## 2025-06-25 DIAGNOSIS — N95.2 VAGINAL ATROPHY: ICD-10-CM

## 2025-06-25 DIAGNOSIS — L90.0 LICHEN SCLEROSUS: ICD-10-CM

## 2025-06-25 NOTE — TELEPHONE ENCOUNTER
Request received for   Requested Prescriptions     Pending Prescriptions Disp Refills    clobetasol (Temovate) 0.05 % ointment 60 g 2     Sig: Apply topically 2 times a day.       Roseanne Lira was last seen 4/11/2025.

## 2025-06-26 ENCOUNTER — APPOINTMENT (OUTPATIENT)
Dept: OBSTETRICS AND GYNECOLOGY | Facility: CLINIC | Age: 75
End: 2025-06-26
Payer: MEDICARE

## 2025-06-26 RX ORDER — ESTRADIOL 0.1 MG/G
1 CREAM VAGINAL 2 TIMES WEEKLY
Qty: 42.5 G | Refills: 2 | Status: SHIPPED | OUTPATIENT
Start: 2025-06-26

## 2025-06-26 RX ORDER — CLOBETASOL PROPIONATE 0.5 MG/G
OINTMENT TOPICAL 2 TIMES DAILY
Qty: 60 G | Refills: 2 | Status: SHIPPED | OUTPATIENT
Start: 2025-06-26

## 2025-06-26 NOTE — TELEPHONE ENCOUNTER
Patient called again stating that pharmacy does not have either the RX for estrace or clobetasol. Asking if they can be resent and if she can have a call letting her know once they have been so she knows it has been done.

## 2025-06-26 NOTE — TELEPHONE ENCOUNTER
Request received for   Requested Prescriptions     Pending Prescriptions Disp Refills    clobetasol (Temovate) 0.05 % ointment 60 g 2     Sig: Apply topically 2 times a day.    estradiol (Estrace) 0.01 % (0.1 mg/gram) vaginal cream 42.5 g 2     Sig: Insert 0.25 Applicatorfuls (1 g) into the vagina 2 times a week.       Roseanne Lira was last seen 6/24/2025 and has an appt for 10/20/2025.

## 2025-07-03 DIAGNOSIS — E78.5 DYSLIPIDEMIA: ICD-10-CM

## 2025-07-03 RX ORDER — PRAVASTATIN SODIUM 10 MG/1
10 TABLET ORAL NIGHTLY
Qty: 90 TABLET | Refills: 2 | Status: SHIPPED | OUTPATIENT
Start: 2025-07-03

## 2025-07-04 DIAGNOSIS — K21.9 GASTRO-ESOPHAGEAL REFLUX DISEASE WITHOUT ESOPHAGITIS: ICD-10-CM

## 2025-07-07 RX ORDER — FAMOTIDINE 40 MG/1
40 TABLET, FILM COATED ORAL NIGHTLY
Qty: 90 TABLET | Refills: 1 | Status: SHIPPED | OUTPATIENT
Start: 2025-07-07

## 2025-07-11 ENCOUNTER — TELEPHONE (OUTPATIENT)
Dept: RADIATION ONCOLOGY | Facility: HOSPITAL | Age: 75
End: 2025-07-11
Payer: MEDICARE

## 2025-07-14 ENCOUNTER — HOSPITAL ENCOUNTER (OUTPATIENT)
Dept: RADIATION ONCOLOGY | Facility: HOSPITAL | Age: 75
Setting detail: RADIATION/ONCOLOGY SERIES
Discharge: HOME | End: 2025-07-14
Payer: MEDICARE

## 2025-07-14 VITALS
WEIGHT: 196.2 LBS | TEMPERATURE: 96.6 F | RESPIRATION RATE: 18 BRPM | OXYGEN SATURATION: 96 % | HEART RATE: 63 BPM | SYSTOLIC BLOOD PRESSURE: 169 MMHG | BODY MASS INDEX: 33.68 KG/M2 | DIASTOLIC BLOOD PRESSURE: 72 MMHG

## 2025-07-14 DIAGNOSIS — C54.1 ENDOMETRIAL ADENOCARCINOMA (MULTI): Primary | ICD-10-CM

## 2025-07-14 PROCEDURE — 99214 OFFICE O/P EST MOD 30 MIN: CPT | Performed by: NURSE PRACTITIONER

## 2025-07-14 ASSESSMENT — ENCOUNTER SYMPTOMS
CHILLS: 0
CONSTIPATION: 0
LOSS OF SENSATION IN FEET: 0
DIFFICULTY URINATING: 0
NEUROLOGICAL NEGATIVE: 1
DIAPHORESIS: 0
DYSURIA: 0
BLOOD IN STOOL: 0
OCCASIONAL FEELINGS OF UNSTEADINESS: 0
FREQUENCY: 0
DEPRESSION: 0
BACK PAIN: 0
RESPIRATORY NEGATIVE: 1
RECTAL PAIN: 0
ANAL BLEEDING: 0
NECK PAIN: 0
UNEXPECTED WEIGHT CHANGE: 0
FATIGUE: 0
ARTHRALGIAS: 1
APPETITE CHANGE: 0
ABDOMINAL PAIN: 0
MYALGIAS: 0
CARDIOVASCULAR NEGATIVE: 1
NECK STIFFNESS: 0
VOMITING: 0
FEVER: 0
DIARRHEA: 0
PSYCHIATRIC NEGATIVE: 1
JOINT SWELLING: 0
HEMATOLOGIC/LYMPHATIC NEGATIVE: 1
ABDOMINAL DISTENTION: 0
NAUSEA: 0
ACTIVITY CHANGE: 0
HEMATURIA: 0

## 2025-07-14 ASSESSMENT — PATIENT HEALTH QUESTIONNAIRE - PHQ9
2. FEELING DOWN, DEPRESSED OR HOPELESS: NOT AT ALL
SUM OF ALL RESPONSES TO PHQ9 QUESTIONS 1 AND 2: 0
1. LITTLE INTEREST OR PLEASURE IN DOING THINGS: NOT AT ALL

## 2025-07-14 ASSESSMENT — PAIN SCALES - GENERAL: PAINLEVEL_OUTOF10: 5

## 2025-07-14 NOTE — PROGRESS NOTES
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 Magallon; adding to gyn TB     Dr. Magallon had referred the patient for evaluation and treatment for radiation.     HDR to the vaginal cuff completed on 10/11/23.    History of presenting illness    Roseanne Lira is a 74 y.o. female who presents today for follow up 1 year and 9 months s/p HDR to the vaginal cuff. Patient is doing well. Energy is baseline. Appetite is good. Weight stable. Denies pelvic pain, vaginal bleeding or discharge. She had surgery and a sling placed for prolapse. Has to do Kegel exercises or she notices leakage. Bowels baseline. Denies rectal bleeding. No longer using vaginal dilator. Patient is not sexually active. She saw Dr. Pack's NP for vulvar irritation. Given cream for Lichen Sclerosis. Its helping some. Follow up with Dr. Pack in October. Patient is scheduled for left knee replacement in September. Had right done years ago. Endorsing some left hip pain. Imaging was negative. Trying to get in with pain doctor.      Review of systems:  Review of Systems   Constitutional:  Negative for activity change, appetite change, chills, diaphoresis, fatigue, fever and unexpected weight change.   HENT: Negative.     Respiratory: Negative.     Cardiovascular: Negative.    Gastrointestinal:  Negative for abdominal distention, abdominal pain, anal bleeding, blood in stool, constipation, diarrhea, nausea, rectal pain and vomiting.   Genitourinary:  Negative for decreased urine volume, difficulty urinating, dysuria, enuresis, frequency, hematuria, pelvic pain,  urgency, vaginal bleeding, vaginal discharge and vaginal pain.   Musculoskeletal:  Positive for arthralgias. Negative for back pain, gait problem, joint swelling, myalgias, neck pain and neck stiffness.   Skin: Negative.    Neurological: Negative.    Hematological: Negative.    Psychiatric/Behavioral: Negative.         Past Medical history  She  has a past surgical history that includes Spine surgery (06/03/2013); Other surgical history (06/03/2013); Breast surgery (06/03/2013); Colonoscopy (06/03/2013); Other surgical history (10/19/2020); Tubal ligation (05/19/2015); Other surgical history (03/13/2017); Other surgical history (03/13/2017); MR angio head wo IV contrast (07/27/2020); MR angio neck wo IV contrast (07/27/2020); MR angio head wo IV contrast (05/18/2017); MR angio neck wo IV contrast (05/18/2017); CT angio neck (12/09/2022); CT angio head w and wo IV contrast (12/09/2022); Breast lumpectomy (Right); Cataract extraction; Hysterectomy; Oophorectomy (August 2023); Endometrial ablation; Brain surgery; and Breast biopsy (July 2011).        Last recorded vital:  /72   Pulse 63   Temp 35.9 °C (96.6 °F) (Temporal)   Resp 18   Wt 89 kg (196 lb 3.2 oz)   SpO2 96%   BMI 33.68 kg/m²     Physical exam  Physical Exam  Constitutional:       General: She is not in acute distress.     Appearance: Normal appearance. She is not ill-appearing, toxic-appearing or diaphoretic.   HENT:      Head: Normocephalic.   Cardiovascular:      Rate and Rhythm: Normal rate and regular rhythm.      Pulses: Normal pulses.      Heart sounds: Normal heart sounds.   Pulmonary:      Effort: Pulmonary effort is normal.      Breath sounds: Normal breath sounds.   Abdominal:      General: Bowel sounds are normal. There is no distension.      Palpations: Abdomen is soft. There is no mass.      Tenderness: There is no abdominal tenderness. There is no guarding or rebound.      Hernia: No hernia is present.   Genitourinary:     General:  Normal vulva.      Pubic Area: No rash.       Labia:         Right: No rash, tenderness, lesion or injury.         Left: No rash, tenderness, lesion or injury.       Urethra: No prolapse, urethral pain, urethral swelling or urethral lesion.      Vagina: No vaginal discharge.      Comments: Vulvar Lichens Sclerosis.  Urethral meatus normal. Vagina normal.  Uterus and cervix surgically absent.  No evidence of masses in pelvis. No pain with palpation.  No vaginal bleeding or abnormal discharge.  Musculoskeletal:      Cervical back: Normal range of motion and neck supple.   Skin:     General: Skin is warm and dry.   Neurological:      General: No focal deficit present.      Mental Status: She is alert and oriented to person, place, and time.   Psychiatric:         Mood and Affect: Mood normal.         Behavior: Behavior normal.         Thought Content: Thought content normal.         Judgment: Judgment normal.       Plan:  Assessment/Plan     74 year old female 1 year and 9 months s/p HDR to the vaginal cuff. Patient is doing well with no acute complaints related to treatment. BINH on exam. Patient no longer using dilators.   Continue follow up with Gyn as scheduled. Patient now almost 2 years out. No evidence of recurrence and no side effects related to treatment. Patient to return to clinic PRN. Instructed to call with questions or concerns.

## 2025-07-28 DIAGNOSIS — I48.0 PAROXYSMAL ATRIAL FIBRILLATION (MULTI): Primary | ICD-10-CM

## 2025-08-11 ENCOUNTER — OFFICE VISIT (OUTPATIENT)
Dept: ORTHOPEDIC SURGERY | Facility: CLINIC | Age: 75
End: 2025-08-11
Payer: MEDICARE

## 2025-08-11 ENCOUNTER — HOSPITAL ENCOUNTER (OUTPATIENT)
Dept: RADIOLOGY | Facility: CLINIC | Age: 75
Discharge: HOME | End: 2025-08-11
Payer: MEDICARE

## 2025-08-11 DIAGNOSIS — Z96.659 PAINFUL TOTAL KNEE REPLACEMENT, SUBSEQUENT ENCOUNTER: ICD-10-CM

## 2025-08-11 DIAGNOSIS — M17.12 PRIMARY OSTEOARTHRITIS OF LEFT KNEE: ICD-10-CM

## 2025-08-11 DIAGNOSIS — T84.84XD PAINFUL TOTAL KNEE REPLACEMENT, SUBSEQUENT ENCOUNTER: ICD-10-CM

## 2025-08-11 DIAGNOSIS — Z47.1 AFTERCARE FOLLOWING RIGHT KNEE JOINT REPLACEMENT SURGERY: ICD-10-CM

## 2025-08-11 DIAGNOSIS — M17.10 PRIMARY OSTEOARTHRITIS OF KNEE, UNSPECIFIED LATERALITY: ICD-10-CM

## 2025-08-11 DIAGNOSIS — Z96.651 AFTERCARE FOLLOWING RIGHT KNEE JOINT REPLACEMENT SURGERY: ICD-10-CM

## 2025-08-11 PROCEDURE — 99212 OFFICE O/P EST SF 10 MIN: CPT | Performed by: STUDENT IN AN ORGANIZED HEALTH CARE EDUCATION/TRAINING PROGRAM

## 2025-08-11 PROCEDURE — 1159F MED LIST DOCD IN RCRD: CPT | Performed by: STUDENT IN AN ORGANIZED HEALTH CARE EDUCATION/TRAINING PROGRAM

## 2025-08-11 PROCEDURE — 99214 OFFICE O/P EST MOD 30 MIN: CPT | Performed by: STUDENT IN AN ORGANIZED HEALTH CARE EDUCATION/TRAINING PROGRAM

## 2025-08-11 PROCEDURE — G2211 COMPLEX E/M VISIT ADD ON: HCPCS | Performed by: STUDENT IN AN ORGANIZED HEALTH CARE EDUCATION/TRAINING PROGRAM

## 2025-08-11 PROCEDURE — 77073 BONE LENGTH STUDIES: CPT

## 2025-08-11 PROCEDURE — 73564 X-RAY EXAM KNEE 4 OR MORE: CPT | Mod: LT

## 2025-08-11 PROCEDURE — 77073 BONE LENGTH STUDIES: CPT | Performed by: RADIOLOGY

## 2025-08-12 ENCOUNTER — APPOINTMENT (OUTPATIENT)
Dept: RHEUMATOLOGY | Facility: CLINIC | Age: 75
End: 2025-08-12
Payer: MEDICARE

## 2025-08-12 VITALS
SYSTOLIC BLOOD PRESSURE: 118 MMHG | BODY MASS INDEX: 33.29 KG/M2 | HEART RATE: 77 BPM | HEIGHT: 64 IN | DIASTOLIC BLOOD PRESSURE: 71 MMHG | OXYGEN SATURATION: 96 % | WEIGHT: 195 LBS

## 2025-08-12 DIAGNOSIS — M79.7 FIBROMYALGIA: ICD-10-CM

## 2025-08-12 DIAGNOSIS — M79.18 MUSCULOSKELETAL PAIN: ICD-10-CM

## 2025-08-12 DIAGNOSIS — M85.80 OSTEOPENIA, UNSPECIFIED LOCATION: Primary | ICD-10-CM

## 2025-08-12 DIAGNOSIS — M35.3 PMR (POLYMYALGIA RHEUMATICA) (MULTI): ICD-10-CM

## 2025-08-12 DIAGNOSIS — M15.0 PRIMARY OSTEOARTHRITIS INVOLVING MULTIPLE JOINTS: ICD-10-CM

## 2025-08-12 DIAGNOSIS — M81.0 OSTEOPOROSIS, UNSPECIFIED OSTEOPOROSIS TYPE, UNSPECIFIED PATHOLOGICAL FRACTURE PRESENCE: ICD-10-CM

## 2025-08-12 PROCEDURE — 3074F SYST BP LT 130 MM HG: CPT | Performed by: STUDENT IN AN ORGANIZED HEALTH CARE EDUCATION/TRAINING PROGRAM

## 2025-08-12 PROCEDURE — 1125F AMNT PAIN NOTED PAIN PRSNT: CPT | Performed by: STUDENT IN AN ORGANIZED HEALTH CARE EDUCATION/TRAINING PROGRAM

## 2025-08-12 PROCEDURE — 1036F TOBACCO NON-USER: CPT | Performed by: STUDENT IN AN ORGANIZED HEALTH CARE EDUCATION/TRAINING PROGRAM

## 2025-08-12 PROCEDURE — 99214 OFFICE O/P EST MOD 30 MIN: CPT | Performed by: STUDENT IN AN ORGANIZED HEALTH CARE EDUCATION/TRAINING PROGRAM

## 2025-08-12 PROCEDURE — 3078F DIAST BP <80 MM HG: CPT | Performed by: STUDENT IN AN ORGANIZED HEALTH CARE EDUCATION/TRAINING PROGRAM

## 2025-08-12 PROCEDURE — G2211 COMPLEX E/M VISIT ADD ON: HCPCS | Performed by: STUDENT IN AN ORGANIZED HEALTH CARE EDUCATION/TRAINING PROGRAM

## 2025-08-12 PROCEDURE — 1159F MED LIST DOCD IN RCRD: CPT | Performed by: STUDENT IN AN ORGANIZED HEALTH CARE EDUCATION/TRAINING PROGRAM

## 2025-08-12 ASSESSMENT — PAIN SCALES - GENERAL: PAINLEVEL_OUTOF10: 5

## 2025-08-18 ENCOUNTER — APPOINTMENT (OUTPATIENT)
Dept: CARDIOLOGY | Facility: CLINIC | Age: 75
End: 2025-08-18
Payer: MEDICARE

## 2025-08-18 VITALS
BODY MASS INDEX: 33.34 KG/M2 | HEIGHT: 64 IN | OXYGEN SATURATION: 94 % | WEIGHT: 195.31 LBS | HEART RATE: 59 BPM | DIASTOLIC BLOOD PRESSURE: 71 MMHG | SYSTOLIC BLOOD PRESSURE: 114 MMHG

## 2025-08-18 DIAGNOSIS — I48.0 PAROXYSMAL ATRIAL FIBRILLATION (MULTI): Primary | ICD-10-CM

## 2025-08-18 PROCEDURE — 99214 OFFICE O/P EST MOD 30 MIN: CPT | Performed by: INTERNAL MEDICINE

## 2025-08-18 PROCEDURE — 1159F MED LIST DOCD IN RCRD: CPT | Performed by: INTERNAL MEDICINE

## 2025-08-18 PROCEDURE — 1036F TOBACCO NON-USER: CPT | Performed by: INTERNAL MEDICINE

## 2025-08-18 PROCEDURE — 99212 OFFICE O/P EST SF 10 MIN: CPT

## 2025-08-18 PROCEDURE — 3074F SYST BP LT 130 MM HG: CPT | Performed by: INTERNAL MEDICINE

## 2025-08-18 PROCEDURE — 1160F RVW MEDS BY RX/DR IN RCRD: CPT | Performed by: INTERNAL MEDICINE

## 2025-08-18 PROCEDURE — 1125F AMNT PAIN NOTED PAIN PRSNT: CPT | Performed by: INTERNAL MEDICINE

## 2025-08-18 PROCEDURE — 3078F DIAST BP <80 MM HG: CPT | Performed by: INTERNAL MEDICINE

## 2025-08-18 ASSESSMENT — PAIN SCALES - GENERAL: PAINLEVEL_OUTOF10: 5

## 2025-08-18 ASSESSMENT — ENCOUNTER SYMPTOMS
OCCASIONAL FEELINGS OF UNSTEADINESS: 1
LOSS OF SENSATION IN FEET: 0

## 2025-08-21 ENCOUNTER — APPOINTMENT (OUTPATIENT)
Dept: NEUROLOGY | Facility: CLINIC | Age: 75
End: 2025-08-21
Payer: MEDICARE

## 2025-08-26 ENCOUNTER — CLINICAL SUPPORT (OUTPATIENT)
Dept: PREADMISSION TESTING | Facility: HOSPITAL | Age: 75
End: 2025-08-26
Payer: MEDICARE

## 2025-08-26 RX ORDER — MAGNESIUM 250 MG
TABLET ORAL DAILY
COMMUNITY

## 2025-08-28 ENCOUNTER — PRE-ADMISSION TESTING (OUTPATIENT)
Dept: PREADMISSION TESTING | Facility: HOSPITAL | Age: 75
End: 2025-08-28
Payer: MEDICARE

## 2025-08-28 ENCOUNTER — LAB (OUTPATIENT)
Dept: LAB | Facility: HOSPITAL | Age: 75
End: 2025-08-28
Payer: MEDICARE

## 2025-08-28 ASSESSMENT — ENCOUNTER SYMPTOMS
RESPIRATORY NEGATIVE: 1
LIMITED RANGE OF MOTION: 1
SINUS CONGESTION: 1
GASTROINTESTINAL NEGATIVE: 1
DYSPNEA WITH EXERTION: 1
ENDOCRINE NEGATIVE: 1
CONSTITUTIONAL NEGATIVE: 1
NECK NEGATIVE: 1
ARTHRALGIAS: 1
NEUROLOGICAL NEGATIVE: 1

## 2025-08-29 ENCOUNTER — HOSPITAL ENCOUNTER (OUTPATIENT)
Dept: RADIOLOGY | Facility: EXTERNAL LOCATION | Age: 75
Discharge: HOME | End: 2025-08-29

## 2025-08-29 ENCOUNTER — OFFICE VISIT (OUTPATIENT)
Dept: ORTHOPEDIC SURGERY | Facility: HOSPITAL | Age: 75
End: 2025-08-29
Payer: MEDICARE

## 2025-08-29 DIAGNOSIS — G89.29 CHRONIC KNEE PAIN AFTER TOTAL REPLACEMENT OF RIGHT KNEE JOINT: Primary | ICD-10-CM

## 2025-08-29 DIAGNOSIS — Z96.651 CHRONIC KNEE PAIN AFTER TOTAL REPLACEMENT OF RIGHT KNEE JOINT: Primary | ICD-10-CM

## 2025-08-29 DIAGNOSIS — M25.561 CHRONIC KNEE PAIN AFTER TOTAL REPLACEMENT OF RIGHT KNEE JOINT: Primary | ICD-10-CM

## 2025-08-29 PROCEDURE — 1125F AMNT PAIN NOTED PAIN PRSNT: CPT | Performed by: FAMILY MEDICINE

## 2025-08-29 PROCEDURE — 1159F MED LIST DOCD IN RCRD: CPT | Performed by: FAMILY MEDICINE

## 2025-08-29 PROCEDURE — 99213 OFFICE O/P EST LOW 20 MIN: CPT | Performed by: FAMILY MEDICINE

## 2025-08-29 PROCEDURE — 20611 DRAIN/INJ JOINT/BURSA W/US: CPT | Mod: RT | Performed by: FAMILY MEDICINE

## 2025-08-29 ASSESSMENT — PAIN SCALES - GENERAL: PAINLEVEL_OUTOF10: 4

## 2025-08-29 ASSESSMENT — PAIN - FUNCTIONAL ASSESSMENT: PAIN_FUNCTIONAL_ASSESSMENT: 0-10

## 2025-08-29 ASSESSMENT — PAIN DESCRIPTION - DESCRIPTORS: DESCRIPTORS: ACHING

## 2025-09-04 ENCOUNTER — TELEPHONE (OUTPATIENT)
Dept: ORTHOPEDIC SURGERY | Facility: HOSPITAL | Age: 75
End: 2025-09-04
Payer: MEDICARE

## 2025-10-06 ENCOUNTER — APPOINTMENT (OUTPATIENT)
Dept: PHYSICAL MEDICINE AND REHAB | Facility: CLINIC | Age: 75
End: 2025-10-06
Payer: MEDICARE

## 2025-10-20 ENCOUNTER — APPOINTMENT (OUTPATIENT)
Dept: CARDIOLOGY | Facility: CLINIC | Age: 75
End: 2025-10-20
Payer: MEDICARE

## 2025-10-28 ENCOUNTER — APPOINTMENT (OUTPATIENT)
Dept: ORTHOPEDIC SURGERY | Facility: CLINIC | Age: 75
End: 2025-10-28
Payer: MEDICARE

## 2025-11-05 ENCOUNTER — APPOINTMENT (OUTPATIENT)
Dept: PHYSICAL MEDICINE AND REHAB | Facility: CLINIC | Age: 75
End: 2025-11-05
Payer: MEDICARE

## 2025-11-11 ENCOUNTER — APPOINTMENT (OUTPATIENT)
Dept: PRIMARY CARE | Facility: CLINIC | Age: 75
End: 2025-11-11
Payer: MEDICARE

## 2026-06-11 ENCOUNTER — APPOINTMENT (OUTPATIENT)
Dept: PRIMARY CARE | Facility: CLINIC | Age: 76
End: 2026-06-11
Payer: MEDICARE

## (undated) DEVICE — TOWEL, SURGICAL, NEURO, O/R, 16 X 26, BLUE, STERILE

## (undated) DEVICE — SUCTION TIP , YANKAUER, W/BULB SUCTION

## (undated) DEVICE — TUBING, SUCTION, 6MM X 10, CLEAN N-COND

## (undated) DEVICE — PAD, GROUNDING, ELECTROSURGICAL, W/9 FT CABLE, POLYHESIVE II, ADULT, LF

## (undated) DEVICE — APPLICATOR, CHLORAPREP, W/ORANGE TINT, 26ML

## (undated) DEVICE — STRIP, SKIN CLOSURE, STERI STRIP, REINFORCED, 0.5 X 4 IN

## (undated) DEVICE — SUTURE, SILK, 2-0, 30 IN, SH, BLACK

## (undated) DEVICE — PACK, BASIC

## (undated) DEVICE — ELECTRODE, ELECTROSURGICAL, BLADE, INSULATED, ENT/IMA, STERILE

## (undated) DEVICE — SUTURE, MONOCRYL, 4-0, 18 IN, PS2, UNDYED

## (undated) DEVICE — SYRINGE, 10 CC, LUER LOCK

## (undated) DEVICE — DRAPE, SHEET, LAPAROTOMY

## (undated) DEVICE — SUTURE, VICRYL, 3-0, 27 IN, SH

## (undated) DEVICE — GLOVE, SURGICAL, PROTEXIS PI , 7.0, PF, LF

## (undated) DEVICE — NEEDLE, HYPODERMIC, 25 G X 1.5 IN, A BEVEL, STERILE